# Patient Record
Sex: MALE | Race: WHITE | NOT HISPANIC OR LATINO | Employment: OTHER | URBAN - METROPOLITAN AREA
[De-identification: names, ages, dates, MRNs, and addresses within clinical notes are randomized per-mention and may not be internally consistent; named-entity substitution may affect disease eponyms.]

---

## 2022-10-05 ENCOUNTER — ASSISTED LIVING VISIT (OUTPATIENT)
Dept: GERIATRICS | Facility: CLINIC | Age: 82
End: 2022-10-05
Payer: MEDICARE

## 2022-10-05 DIAGNOSIS — I10 HYPERTENSION, UNSPECIFIED TYPE: ICD-10-CM

## 2022-10-05 DIAGNOSIS — K21.9 GASTROESOPHAGEAL REFLUX DISEASE WITHOUT ESOPHAGITIS: ICD-10-CM

## 2022-10-05 DIAGNOSIS — G25.81 RESTLESS LEGS SYNDROME (RLS): ICD-10-CM

## 2022-10-05 DIAGNOSIS — M79.89 SWELLING OF LOWER LEG: ICD-10-CM

## 2022-10-05 DIAGNOSIS — I48.19 ATRIAL FIBRILLATION, PERSISTENT (H): Primary | ICD-10-CM

## 2022-10-05 DIAGNOSIS — D50.8 OTHER IRON DEFICIENCY ANEMIA: ICD-10-CM

## 2022-10-05 DIAGNOSIS — I73.9 PERIPHERAL VASCULAR DISEASE (H): ICD-10-CM

## 2022-10-05 DIAGNOSIS — J30.9 CHRONIC ALLERGIC RHINITIS: ICD-10-CM

## 2022-10-05 DIAGNOSIS — N18.30 STAGE 3 CHRONIC KIDNEY DISEASE, UNSPECIFIED WHETHER STAGE 3A OR 3B CKD (H): ICD-10-CM

## 2022-10-05 PROBLEM — E87.1 HYPONATREMIA: Status: ACTIVE | Noted: 2019-06-07

## 2022-10-05 PROBLEM — N40.1 BENIGN NON-NODULAR PROSTATIC HYPERPLASIA WITH LOWER URINARY TRACT SYMPTOMS: Status: ACTIVE | Noted: 2017-04-20

## 2022-10-05 PROBLEM — E53.8 B12 DEFICIENCY: Status: ACTIVE | Noted: 2022-03-17

## 2022-10-05 PROBLEM — Z95.3 S/P TAVR (TRANSCATHETER AORTIC VALVE REPLACEMENT), BIOPROSTHETIC: Status: ACTIVE | Noted: 2021-10-21

## 2022-10-05 PROBLEM — R04.0 EPISTAXIS: Status: ACTIVE | Noted: 2022-03-11

## 2022-10-05 PROBLEM — I25.83 CORONARY ARTERY DISEASE DUE TO LIPID RICH PLAQUE: Status: ACTIVE | Noted: 2021-09-03

## 2022-10-05 PROBLEM — I25.10 CORONARY ARTERY DISEASE DUE TO LIPID RICH PLAQUE: Status: ACTIVE | Noted: 2021-09-03

## 2022-10-05 PROBLEM — K68.3 RETROPERITONEAL HEMATOMA: Status: ACTIVE | Noted: 2021-08-26

## 2022-10-05 PROBLEM — I35.0 SEVERE AORTIC VALVE STENOSIS: Status: ACTIVE | Noted: 2020-07-24

## 2022-10-05 PROBLEM — R73.9 HYPERGLYCEMIA: Status: ACTIVE | Noted: 2017-04-20

## 2022-10-05 PROBLEM — N17.9 AKI (ACUTE KIDNEY INJURY) (H): Status: ACTIVE | Noted: 2022-01-02

## 2022-10-05 PROBLEM — L97.522 SKIN ULCER OF LEFT GREAT TOE WITH FAT LAYER EXPOSED (H): Status: RESOLVED | Noted: 2021-02-12 | Resolved: 2022-10-05

## 2022-10-05 PROBLEM — R01.1 MURMUR: Status: ACTIVE | Noted: 2019-06-08

## 2022-10-05 PROBLEM — G47.33 OSA (OBSTRUCTIVE SLEEP APNEA): Status: ACTIVE | Noted: 2022-03-11

## 2022-10-05 PROBLEM — Z96.1 PSEUDOPHAKIA: Status: ACTIVE | Noted: 2022-04-20

## 2022-10-05 PROBLEM — M17.12 OSTEOARTHRITIS OF LEFT KNEE: Status: ACTIVE | Noted: 2022-10-05

## 2022-10-05 PROBLEM — L97.522 SKIN ULCER OF LEFT GREAT TOE WITH FAT LAYER EXPOSED (H): Status: ACTIVE | Noted: 2021-02-12

## 2022-10-05 RX ORDER — FUROSEMIDE 20 MG
20 TABLET ORAL DAILY
COMMUNITY
End: 2023-10-18 | Stop reason: ALTCHOICE

## 2022-10-05 RX ORDER — BIOTIN 1 MG
1000 TABLET ORAL DAILY
COMMUNITY
End: 2023-03-08

## 2022-10-05 RX ORDER — FOLIC ACID 1 MG/1
1 TABLET ORAL DAILY
COMMUNITY
End: 2023-12-13

## 2022-10-05 RX ORDER — CLOPIDOGREL BISULFATE 75 MG/1
75 TABLET ORAL DAILY
COMMUNITY
End: 2023-03-29

## 2022-10-05 RX ORDER — UREA 10 %
500 LOTION (ML) TOPICAL DAILY
COMMUNITY
End: 2024-01-17

## 2022-10-05 RX ORDER — LEVOTHYROXINE SODIUM 150 UG/1
150 TABLET ORAL DAILY
COMMUNITY
End: 2022-10-05

## 2022-10-05 RX ORDER — METHOCARBAMOL 500 MG/1
500 TABLET, FILM COATED ORAL 4 TIMES DAILY PRN
COMMUNITY
End: 2022-10-05

## 2022-10-05 RX ORDER — MOMETASONE FUROATE MONOHYDRATE 50 UG/1
2 SPRAY, METERED NASAL DAILY
COMMUNITY
End: 2024-09-11

## 2022-10-05 RX ORDER — FERROUS GLUCONATE 324(37.5)
1 TABLET ORAL EVERY OTHER DAY
COMMUNITY
End: 2024-01-17

## 2022-10-05 RX ORDER — TAMSULOSIN HYDROCHLORIDE 0.4 MG/1
0.4 CAPSULE ORAL EVERY EVENING
COMMUNITY
End: 2024-04-01

## 2022-10-05 NOTE — LETTER
10/5/2022        RE: Luz Elena Sampson  [address]  [place] [address]        No notes on file      Sincerely,        JITENDRA Freitas CNP

## 2022-10-05 NOTE — PROGRESS NOTES
[facility] GERIATRICS    PRIMARY CARE PROVIDER AND CLINIC:  Chandler Oconnell, JITENDRA CNP, [address] [facility] [address]  Chief Complaint   Patient presents with     [facility] Medical Record Number:  [id number]  Place of Service where encounter took place:  [facility] [[id number]]    Luz Elena Sampson  is a 82 year old  (1940), admitted to the above facility from home due to care needs on 10/2/22. .   HPI:   1. Atrial fibrillation, persistent (H)    2. Peripheral vascular disease (H)    3. Swelling of lower leg    4. Stage 3 chronic kidney disease, unspecified whether stage 3a or 3b CKD (H)    5. Restless legs syndrome (RLS)    6. Other iron deficiency anemia    7. Gastroesophageal reflux disease without esophagitis    8. Chronic allergic rhinitis    9. Hypertension, unspecified type         Patient resided in [place], [place], followed by [facility] and [facility], now moved to [facility] to be closer to family support and started PCP services with [facility] team, seen today in [place] apartment, also left message with son Parmjit RE: status and plan, pleasant 81yo, mild cognitive limitations, Hx/Dx as above plus others, no longer driving, using walker for ambulation, recent bradycardia episode with both amlodipine and metoprolol DC'd, 114/71, 62, IRR, dressed skin tear R arm, edema 2+ with compression on, chronci back pain, recent sleep eval with mild desaturations overnight, overall appears healthy.     CODE STATUS/ADVANCE DIRECTIVES DISCUSSION:  Full Code   ALLERGIES:   Allergies   Allergen Reactions     Warfarin Itching and Other (See Comments)     Alopecia     Nka [No Known Allergies]      Nkda [No Known Drug Allergies]       PAST MEDICAL HISTORY:   Past Medical History:   Diagnosis Date     GERD (gastroesophageal reflux disease)      Hypothyroid      Psoriasis       PAST SURGICAL HISTORY:   has a past surgical history that  includes Cholecystectomy; Arthroplasty hip; Arthrodesis ankle (06/08/2011); Remove hardware ankle (07/10/2012); and Cataract Extraction (Bilateral).  FAMILY HISTORY: family history includes Cataracts in his mother; Dementia in his mother; Diabetes in his maternal grandfather, maternal grandmother, and mother; Heart Disease in his father; Kidney Disease in his father.  SOCIAL HISTORY:   reports that he has quit smoking. His smoking use included cigarettes, pipe, and cigars. He has never used smokeless tobacco. He reports current alcohol use. He reports that he does not use drugs.  Patient's living condition: lives in an assisted living facility    Post Discharge Medication Reconciliation Status:     Post Medication Reconciliation Status: Discharge medications reconciled and changed, see notes/orders         Current Outpatient Medications   Medication Sig     ACE/ARB/ARNI NOT PRESCRIBED (INTENTIONAL) Please choose reason not prescribed from choices below.     biotin 1000 MCG TABS tablet Take 1,000 mcg by mouth daily     carbidopa-levodopa (SINEMET)  MG per tablet Take 1 tablet by mouth At Bedtime     cholecalciferol 25 MCG (1000 UT) TABS Take 1 tablet by mouth daily     clopidogrel (PLAVIX) 75 MG tablet Take 75 mg by mouth daily     cyanocobalamin (VITAMIN B-12) 500 MCG tablet Take 500 mcg by mouth daily     Ferrous Gluconate 324 (37.5 Fe) MG TABS Take 1 tablet by mouth every other day     folic acid (FOLVITE) 1 MG tablet Take 1 mg by mouth daily     furosemide (LASIX) 20 MG tablet Take 20 mg by mouth daily For weight gain of 2 lbs daily or more     mometasone (NASONEX) 50 MCG/ACT nasal spray Spray 2 sprays into both nostrils daily     Multiple Vitamins-Minerals (MULTIVITAL PO) Take  by mouth daily.     omeprazole (PRILOSEC) 20 MG capsule Take 20 mg by mouth daily.     rivaroxaban ANTICOAGULANT (XARELTO) 15 MG TABS tablet Take 15 mg by mouth daily (with dinner)     simvastatin (ZOCOR) 20 MG tablet Take 20 mg by  mouth daily     tamsulosin (FLOMAX) 0.4 MG capsule Take 0.4 mg by mouth every evening     levothyroxine (SYNTHROID/LEVOTHROID) 125 MCG tablet Take 125 mcg by mouth daily.     ORDER FOR DME Wheelchair; Right elevating leg rest  [employer]  Phone: [phone]  Diagnosis: Gait Instability related to Right Ankle Fusion     No current facility-administered medications for this visit.       ROS:  4 point ROS including Respiratory, CV, GI and , other than that noted in the HPI,  is negative    Vitals:  There were no vitals taken for this visit.  Exam:  GENERAL APPEARANCE:  in no distress, appears healthy  ENT:  Mouth and posterior oropharynx normal, moist mucous membranes  RESP:  lungs clear to auscultation   CV:  peripheral edema 2+ in lower legs, irregular rhythm rate  ABDOMEN:  bowel sounds normal  M/S:   Gait and station abnormal unsteady gait  SKIN:  Inspection of skin and subcutaneous tissue baseline  NEURO:   Examination of sensation by touch normal  PSYCH:  oriented X 3    Lab/Diagnostic data:  Recent labs in [facility] reviewed by me today.     ASSESSMENT/PLAN:    (I48.19) Atrial fibrillation, persistent (H)  (primary encounter diagnosis)  Comment: IRR, denies CP/palpitations, also bradycardia at times  Plan:   -continue plavix, statin and xarelto  -refer/follow up cardiology PRN  -staff to check VS as scheduled  -lasix PRN for weight gain 2lb+/day    (I73.9) Peripheral vascular disease (H)  (M79.89) Swelling of lower leg  Comment: edema 2+ firm, cool to touch  Plan: continue complression stockings  -elevate as possible  -follow up vascular PRN    (N18.30) Stage 3 chronic kidney disease, unspecified whether stage 3a or 3b CKD (H)  Comment: lab 9/15 creat 1.35, GFR 51  Plan: dose medications renally as possible  -follow up BMP in 2-3 months    (G25.81) Restless legs syndrome (RLS)  Comment: intermittent, unable to sleep  Plan: continue sinemet for control; effective  -change to OK self administer at HS if having  symptoms    (D50.8) Other iron deficiency anemia  Comment: recent Hgb 11.4  Plan: continue Fe supplement  -check Hgb and Fe with other labs in 2-3 months    (K21.9) Gastroesophageal reflux disease without esophagitis  Comment: chronic, no complaint  Plan: continue omeprazole Qday    (J30.9) Chronic allergic rhinitis  Comment: intermittent runny nose  Plan: continue nasonex daily    (I10) Hypertension, unspecified type  Comment: SBP's in the 130-140 range, HR's 60-70's  Plan: both metoprolol and amlodipine were previously DC'd  -staff to check VS as scheduled  -consider losartan if BP's elevated        Electronically signed by:  JITENDRA Freitas CNP           Documentation of Face-to-Face and Certification for Home Health Services     Patient: Luz Elena Sampson   YOB: 1940  MR Number: [id number]  Today's Date: 10/5/2022    I certify that patient: Luz Elena Sampson is under my care and that I, or a nurse practitioner or physician's assistant working with me, had a face-to-face encounter that meets the physician face-to-face encounter requirements with this patient on: 10/5/2022.    This encounter with the patient was in whole, or in part, for the following medical condition, which is the primary reason for home health care:   1. Atrial fibrillation, persistent (H)    2. Peripheral vascular disease (H)    3. Swelling of lower leg    4. Stage 3 chronic kidney disease, unspecified whether stage 3a or 3b CKD (H)    5. Restless legs syndrome (RLS)    6. Other iron deficiency anemia    7. Gastroesophageal reflux disease without esophagitis    8. Chronic allergic rhinitis    9. Hypertension, unspecified type          I certify that, based on my findings, the following services are medically necessary home health services: Nursing, Occupational Therapy and Physical Therapy.    My clinical findings support the need for the above services because: Nurse is needed: To provide caregiver training to assist with:  med regimen, edema, education.., Occupational Therapy Services are needed to assess and treat cognitive ability and address ADL safety due to impairment in transfers, DME. and Physical Therapy Services are needed to assess and treat the following functional impairments: gait instability.    Further, I certify that my clinical findings support that this patient is homebound (i.e. absences from home require considerable and taxing effort and are for medical reasons or [employer] services or infrequently or of short duration when for other reasons) because: Requires assistance of another person or specialized equipment to access medical services because patient: Is unable to operate assistive equipment on their own...    Based on the above findings. I certify that this patient is confined to the home and needs intermittent skilled nursing care, physical therapy and/or speech therapy.  The patient is under my care, and I have initiated the establishment of the plan of care.  This patient will be followed by a physician who will periodically review the plan of care.  Physician/Provider to provide follow up care: Chandler Oconnell    Responsible Medicare certified PECOS Physician: Chandler Oconnell NP  Electronic Physician Signature: See electronic signature associated with these discharge orders.  Date: 10/5/2022

## 2022-10-06 ENCOUNTER — DOCUMENTATION ONLY (OUTPATIENT)
Dept: OTHER | Facility: CLINIC | Age: 82
End: 2022-10-06

## 2022-10-19 ENCOUNTER — ASSISTED LIVING VISIT (OUTPATIENT)
Dept: GERIATRICS | Facility: CLINIC | Age: 82
End: 2022-10-19
Payer: MEDICARE

## 2022-10-19 DIAGNOSIS — I10 HYPERTENSION, UNSPECIFIED TYPE: ICD-10-CM

## 2022-10-19 DIAGNOSIS — R00.1 BRADYCARDIA: ICD-10-CM

## 2022-10-19 DIAGNOSIS — I48.19 ATRIAL FIBRILLATION, PERSISTENT (H): Primary | ICD-10-CM

## 2022-10-19 DIAGNOSIS — Z95.3 S/P TAVR (TRANSCATHETER AORTIC VALVE REPLACEMENT), BIOPROSTHETIC: ICD-10-CM

## 2022-10-19 NOTE — LETTER
10/19/2022        RE: Luz Elena Sampson  [address]  [place] [address]        No notes on file      Sincerely,        JITENDRA Freitas CNP

## 2022-10-19 NOTE — PROGRESS NOTES
[facility] GERIATRICS    Chief Complaint   Patient presents with     RECHECK     HPI:  Luz Elena Sampson is a 82 year old  (1940), who is being seen today for an episodic care visit at: [facility] ([facility]) [[id number]]. Today's concern is:   1. Atrial fibrillation, persistent (H)    2. Hypertension, unspecified type    3. Bradycardia    4. S/p TAVR (transcatheter aortic valve replacement), bioprosthetic      Patient seen for follow up, also being followed by home care for R arm wound and gait instability, post TAVR in 2021 at [facility], HR was 46 on 10/18, thought by home care is something may be wrong, exam today patient pleasant, oriented, self transfers, appears healthy, /64, HR 64, previous HR was 62, reports it always runs low, could also be from peripheral HR checks versus apical, also mild increase in edema, no distress.     Allergies, and PMH/PSH reviewed in [facility] today.  REVIEW OF SYSTEMS:  4 point ROS including Respiratory, CV, GI and , other than that noted in the HPI,  is negative    Objective:   There were no vitals taken for this visit.  GENERAL APPEARANCE:  in no distress, appears healthy  ENT:  Mouth and posterior oropharynx normal, moist mucous membranes  RESP:  lungs clear to auscultation   CV:  peripheral edema 2+ in lower legs, irregular rhythm rate  ABDOMEN:  bowel sounds normal  M/S:   Gait and station abnormal using scooter  SKIN:  Inspection of skin and subcutaneous tissue baseline  NEURO:   Examination of sensation by touch normal  PSYCH:  affect and mood normal    Recent labs in [facility] reviewed by me today.     Assessment/Plan:  (I48.19) Atrial fibrillation, persistent (H)  (primary encounter diagnosis)  (I10) Hypertension, unspecified type  (R00.1) Bradycardia  (Z95.3) S/p TAVR (transcatheter aortic valve replacement), bioprosthetic  Comment: IRR, HR's 45-65, SBP's 115-160, TAVR in 2021 at [place], no distress, asymptomatic  Plan:   -Adult  Cardiology Eval  Referral  -change lasix to 20mg daily (versus PRN)  -continue compression stockings  -home care RN/PT working with  -continue plavix, xarelto, status  -no plan for changes until pending cards appt  -plan to check labs in early Dec      Post Medication Reconciliation Status: Medication reconciliation previously completed during another office visit        Electronically signed by: JITENDRA Freitas CNP

## 2022-11-09 ENCOUNTER — ASSISTED LIVING VISIT (OUTPATIENT)
Dept: GERIATRICS | Facility: CLINIC | Age: 82
End: 2022-11-09
Payer: MEDICARE

## 2022-11-09 DIAGNOSIS — M79.89 SWELLING OF LOWER LEG: ICD-10-CM

## 2022-11-09 DIAGNOSIS — R04.0 EPISTAXIS: ICD-10-CM

## 2022-11-09 DIAGNOSIS — I48.19 ATRIAL FIBRILLATION, PERSISTENT (H): Primary | ICD-10-CM

## 2022-11-09 RX ORDER — OXYMETAZOLINE HYDROCHLORIDE 0.05 G/100ML
2 SPRAY NASAL 2 TIMES DAILY PRN
Qty: 1 ML | Refills: 0
Start: 2022-11-09 | End: 2024-09-11

## 2022-11-09 NOTE — PROGRESS NOTES
[facility] GERIATRICS    Chief Complaint   Patient presents with     RECHECK     HPI:  Luz Elena Sampson is a 82 year old  (1940), who is being seen today for an episodic care visit at: [facility] ([facility]) [[id number]]. Today's concern is:   1. Atrial fibrillation, persistent (H)    2. Swelling of lower leg    3. Epistaxis      Patient seen today for follow up, HR RR, lungs clear, 144/70, HR 55, appears healthy, bilateral lower legs edema 1+ from lower leg to pedal, PP faint, denies chest discomfort, new nose bleed on 11/7 self reported as mild, has infrequently, overall no distress.    Allergies, and PMH/PSH reviewed in [facility] today.  REVIEW OF SYSTEMS:  4 point ROS including Respiratory, CV, GI and , other than that noted in the HPI,  is negative    Objective:   There were no vitals taken for this visit.  GENERAL APPEARANCE:  in no distress, appears healthy  ENT:  Mouth and posterior oropharynx normal, moist mucous membranes  RESP:  lungs clear to auscultation   CV:  peripheral edema 1+ in lower legs/pedal, rate-normal  ABDOMEN:  bowel sounds normal  M/S:   Gait and station abnormal unsteady gait  SKIN:  Inspection of skin and subcutaneous tissue baseline  NEURO:   Examination of sensation by touch normal  PSYCH:  affect and mood normal    Labs done in SNF are in [facility]. Please refer to them using [facility]/Care Everywhere.    Assessment/Plan:  (I48.19) Atrial fibrillation, persistent (H)  (primary encounter diagnosis)  Comment: RRR, denies chest discomfort  Plan: continue plavix, xarelto and zocor  -follow up cardiology on 11/10; to bring med list and recent BP's to visit for reconciliation    (M79.89) Swelling of lower leg  Comment: 1+, skin intact  Plan: continue lasix 20mg  -of note, not on K supplement  -BMP, TSH, Fe, VitaD and B12 on 12/6    (R04.0) Epistaxis  Comment: another mild episode on 11/7  Plan: afrin 2 spray affected nare BID PRN  -educated on elevation, pressure  and cold    MED REC REQUIRED  Post Medication Reconciliation Status: medication reconcilation previously completed during another office visit        Electronically signed by: JITENDRA Freitas CNP

## 2022-11-09 NOTE — LETTER
11/9/2022        RE: Luz Elena Sampson  [address]  [place] [address]        No notes on file      Sincerely,        JITENDRA Freitas CNP

## 2022-11-10 ENCOUNTER — ANCILLARY PROCEDURE (OUTPATIENT)
Dept: CARDIOLOGY | Facility: CLINIC | Age: 82
End: 2022-11-10
Attending: INTERNAL MEDICINE
Payer: MEDICARE

## 2022-11-10 ENCOUNTER — OFFICE VISIT (OUTPATIENT)
Dept: CARDIOLOGY | Facility: CLINIC | Age: 82
End: 2022-11-10
Attending: NURSE PRACTITIONER
Payer: MEDICARE

## 2022-11-10 VITALS
OXYGEN SATURATION: 97 % | SYSTOLIC BLOOD PRESSURE: 127 MMHG | DIASTOLIC BLOOD PRESSURE: 73 MMHG | HEIGHT: 72 IN | BODY MASS INDEX: 31.29 KG/M2 | WEIGHT: 231 LBS | HEART RATE: 47 BPM

## 2022-11-10 DIAGNOSIS — E78.2 MIXED HYPERLIPIDEMIA: ICD-10-CM

## 2022-11-10 DIAGNOSIS — I10 HYPERTENSION, UNSPECIFIED TYPE: ICD-10-CM

## 2022-11-10 DIAGNOSIS — Z95.3 S/P TAVR (TRANSCATHETER AORTIC VALVE REPLACEMENT), BIOPROSTHETIC: ICD-10-CM

## 2022-11-10 DIAGNOSIS — R00.1 BRADYCARDIA: ICD-10-CM

## 2022-11-10 DIAGNOSIS — N18.31 STAGE 3A CHRONIC KIDNEY DISEASE (H): ICD-10-CM

## 2022-11-10 DIAGNOSIS — I48.19 ATRIAL FIBRILLATION, PERSISTENT (H): ICD-10-CM

## 2022-11-10 DIAGNOSIS — G47.33 OSA (OBSTRUCTIVE SLEEP APNEA): ICD-10-CM

## 2022-11-10 DIAGNOSIS — I48.19 ATRIAL FIBRILLATION, PERSISTENT (H): Primary | ICD-10-CM

## 2022-11-10 PROCEDURE — 93242 EXT ECG>48HR<7D RECORDING: CPT | Performed by: INTERNAL MEDICINE

## 2022-11-10 PROCEDURE — 99204 OFFICE O/P NEW MOD 45 MIN: CPT | Performed by: INTERNAL MEDICINE

## 2022-11-10 PROCEDURE — 93244 EXT ECG>48HR<7D REV&INTERPJ: CPT | Performed by: INTERNAL MEDICINE

## 2022-11-10 PROCEDURE — 93000 ELECTROCARDIOGRAM COMPLETE: CPT | Performed by: INTERNAL MEDICINE

## 2022-11-10 NOTE — DISCHARGE INSTRUCTIONS
We have applied a Zio Patch (heart) monitor for you to wear for 3.  You may remove the heart monitor on 11/13/22 at 4pm.  Please see the enclosed instructions for further information, as well as instructions for removal of the heart monitor and return mailing directions.  If you should have questions regarding your monitor, please call [employer] at [phone].  The Cardiology Nurse will contact you with your results (please see result notification details at bottom of summary).    *PLEASE DO NOT SHOWER OR INDUCE EXCESSIVE SWEATING IN THE FIRST 24 HOURS OF WEARING*    Please also call your insurance company for any billing questions regarding the monitor.

## 2022-11-10 NOTE — LETTER
11/10/2022      RE: Luz Elena Sampson  [address] [place] [address]  [place] [address]       Dear Colleague,    Thank you for the opportunity to participate in the care of your patient, Luz Elena Sampson, at the [facility]. Please see a copy of my visit note below.    I am delighted to see Luz Elena Sampson in consultation.The primary encounter diagnosis was Mixed hyperlipidemia. Diagnoses of Atrial fibrillation, persistent (H), Hypertension, unspecified type, Bradycardia, S/p TAVR (transcatheter aortic valve replacement), bioprosthetic, JOSE (obstructive sleep apnea), and Stage 3a chronic kidney disease (H) were also pertinent to this visit.   As you know, the patient is a 82 year old  male. He   has a past medical history of Atrial fibrillation (H), Chronic kidney disease, GERD (gastroesophageal reflux disease), Hyperlipidemia, Hypertension, Hypothyroid, and Psoriasis..    On this visit, the patient states that he has no complaints.  The patient denies chest pressure/discomfort, dyspnea, palpitations, near-syncope, syncope, orthopnea and paroxysmal nocturnal dyspnea.    The patient's cardiovascular risk factors include known cardiac disease, hypertension and high cholesterol.    The following portions of the patient's history were reviewed and updated as appropriate: allergies, current medications, past family history, past medical history, past social history, past surgical history, and the problem list.    PMH: The patient's past medical history includes:    Past Medical History:   Diagnosis Date     Atrial fibrillation (H)      Chronic kidney disease      GERD (gastroesophageal reflux disease)      Hyperlipidemia      Hypertension      Hypothyroid      Psoriasis       Past Surgical History:   Procedure Laterality Date     ARTHRODESIS ANKLE  06/08/2011    Procedure:ARTHRODESIS ANKLE; Subtalar and Tibiotalar Fusion     ; Surgeon:TIMMY  FLAQUITO VALLADARES; [facility]     ARTHROPLASTY HIP      right     CATARACT EXTRACTION Bilateral      CHOLECYSTECTOMY       CV TRANSCATHETER AORTIC VALVE REPLACEMENT TRANSAORTIC APPROACH N/A      REMOVE HARDWARE ANKLE  07/10/2012    Procedure: REMOVE HARDWARE ANKLE;  Right Heel Hardware Removal with irrigation  ;  Surgeon: Flaquito Hull MD;  [facility]       The patient's medications as of the current encounter are:     Current Outpatient Medications   Medication Sig Dispense Refill     ACE/ARB/ARNI NOT PRESCRIBED (INTENTIONAL) Please choose reason not prescribed from choices below.       biotin 1000 MCG TABS tablet Take 1,000 mcg by mouth daily       carbidopa-levodopa (SINEMET)  MG per tablet Take 1 tablet by mouth At Bedtime       cholecalciferol 25 MCG (1000 UT) TABS Take 1 tablet by mouth daily       clopidogrel (PLAVIX) 75 MG tablet Take 75 mg by mouth daily       cyanocobalamin (VITAMIN B-12) 500 MCG tablet Take 500 mcg by mouth daily       Ferrous Gluconate 324 (37.5 Fe) MG TABS Take 1 tablet by mouth every other day       folic acid (FOLVITE) 1 MG tablet Take 1 mg by mouth daily       furosemide (LASIX) 20 MG tablet Take 20 mg by mouth daily       levothyroxine (SYNTHROID/LEVOTHROID) 125 MCG tablet Take 125 mcg by mouth daily.       mometasone (NASONEX) 50 MCG/ACT nasal spray Spray 2 sprays into both nostrils daily       Multiple Vitamins-Minerals (MULTIVITAL PO) Take  by mouth daily.       omeprazole (PRILOSEC) 20 MG capsule Take 20 mg by mouth daily.       ORDER FOR [facility] Wheelchair; Right elevating leg rest  [facility]  Phone: [phone]  Diagnosis: Gait Instability related to Right Ankle Fusion 1 Device 0     oxymetazoline (AFRIN) 0.05 % nasal spray Spray 2 sprays into both nostrils 2 times daily as needed for congestion 1 mL 0     rivaroxaban ANTICOAGULANT (XARELTO) 15 MG TABS tablet Take 15 mg by mouth daily (with dinner)       simvastatin (ZOCOR) 20 MG tablet Take 20 mg by mouth  daily       tamsulosin (FLOMAX) 0.4 MG capsule Take 0.4 mg by mouth every evening         Labs:     Office Visit on 11/19/2012   Component Date Value Ref Range Status     CRP Inflammation 11/19/2012 5.2  0.0 - 8.0 mg/L Final     Sed Rate 11/19/2012 13  0 - 20 mm/h Final     WBC 11/19/2012 4.9  4.0 - 11.0 10e9/L Final     RBC Count 11/19/2012 4.73  4.4 - 5.9 10e12/L Final     Hemoglobin 11/19/2012 14.0  13.3 - 17.7 g/dL Final     Hematocrit 11/19/2012 40.3  40.0 - 53.0 % Final     MCV 11/19/2012 85  78 - 100 fl Final     MCH 11/19/2012 29.6  26.5 - 33.0 pg Final     MCHC 11/19/2012 34.7  31.5 - 36.5 g/dL Final     RDW 11/19/2012 14.8  10.0 - 15.0 % Final     Platelet Count 11/19/2012 164  150 - 450 10e9/L Final     Diff Method 11/19/2012 Automated Method   Final     % Neutrophils 11/19/2012 53.5  40 - 75 % Final     % Lymphocytes 11/19/2012 28.9  20 - 48 % Final     % Monocytes 11/19/2012 8.2  0 - 12 % Final     % Eosinophils 11/19/2012 8.4 (H)  0 - 6 % Final     % Basophils 11/19/2012 0.8  0 - 2 % Final     % Immature Granulocytes 11/19/2012 0.2  0 - 0.4 % Final     Absolute Neutrophil 11/19/2012 2.6  1.6 - 8.3 10e9/L Final     Absolute Lymphocytes 11/19/2012 1.4  0.8 - 5.3 10e9/L Final     Absolute Monocytes 11/19/2012 0.4  0.0 - 1.3 10e9/L Final     Absolute Eosinophils 11/19/2012 0.4  0.0 - 0.7 10e9/L Final     Absolute Basophils 11/19/2012 0.0  0.0 - 0.2 10e9/L Final     Abs Immature Granulocytes 11/19/2012 0.0  0 - 0.03 10e9/L Final     Sodium 11/19/2012 140  133 - 144 mmol/L Final     Potassium 11/19/2012 4.8  3.4 - 5.3 mmol/L Final     Chloride 11/19/2012 108  94 - 109 mmol/L Final     Carbon Dioxide 11/19/2012 24  20 - 32 mmol/L Final     Anion Gap 11/19/2012 8  6 - 17 mmol/L Final     Glucose 11/19/2012 90  60 - 99 mg/dL Final     Urea Nitrogen 11/19/2012 25  7 - 30 mg/dL Final     Creatinine 11/19/2012 1.33 (H)  0.66 - 1.25 mg/dL Final     GFR Estimate 11/19/2012 53 (L)  >60 mL/min/1.7m2 Final     GFR  Estimate If Black 11/19/2012 64  >60 mL/min/1.7m2 Final     Calcium 11/19/2012 8.8  8.5 - 10.4 mg/dL Final       Allergies:    Allergies   Allergen Reactions     Warfarin Itching and Other (See Comments)     Alopecia     Nka [No Known Allergies]      Nkda [No Known Drug Allergies]        Family History:   Family History   Problem Relation Age of Onset     Cataracts Mother      Dementia Mother      Diabetes Mother      Alzheimer Disease Mother      Hypertension Father      Heart Disease Father      Kidney Disease Father      Diabetes Maternal Grandmother      Diabetes Maternal Grandfather      Spina bifida Brother      Low Back Problems Daughter        Psychosocial history:  reports that he has quit smoking. His smoking use included cigarettes, pipe, and cigars. He has never used smokeless tobacco. He reports current alcohol use. He reports that he does not use drugs.    Review of systems: negative for, palpitations, exertional chest pain or pressure, paroxysmal nocturnal dyspnea, dyspnea on exertion, orthopnea and syncope or near-syncope    In addition,   General: No change in weight, sleep or appetite.  Normal energy.  No fever or chills  Eyes: Negative for vision changes or eye problems  ENT: No problems with ears, nose or throat.  No difficulty swallowing.  Resp: No coughing, wheezing or shortness of breath  GI: No nausea, vomiting,  heartburn, abdominal pain, diarrhea, constipation or change in bowel habits  : CKD  Musculoskeletal: No significant muscle or joint pains  Neurologic: No headaches, numbness, tingling, weakness, problems with balance or coordination  Psychiatric: No problems with anxiety, depression or mental health  Heme/immune/allergy: No history of bleeding or clotting problems or anemia.    Endocrine: No history of thyroid disease, diabetes or other endocrine disorders  Skin: No rashes,worrisome lesions or skin problems  Vascular:  No claudication, lifestyle limiting or otherwise; no ischemic  rest pain; no non-healing ulcers. No weakness, No loss of sensation        Physical examination  Vitals: /73   Pulse (!) 47   Ht 1.829 m (6')   Wt 104.8 kg (231 lb)   SpO2 97%   BMI 31.33 kg/m    BMI= Body mass index is 31.33 kg/m .    In general, the patient is a pleasant male in no apparent distress.    HEENT: Normiocephalic and atraumatic.  PERRLA.  EOMI.  Sclerae white, not injected.  Nares clear.  Pharynx without erythema or exudate.  Dentition intact.    Neck: No adenopathy.  No thyromegaly. Carotids +2/2 bilaterally without bruits.  No jugular venous distension.   Heart:  The PMI is in the 5th ICS in the midclavicular line. There is no heave. Irregularly irregular. Normal S1, S2 splits physiologically. No murmur, rub, click, or gallop.    Lungs: Clear to asculation.  No ronchi, wheezes, rales.  No dullness to percussion.   Abdomen: Soft, nontender, nondistended. No organomegaly. No AAA.  No bruits.   Extremities: No clubbing, cyanosis. Bilat mild edema. The pulses were intact bilaterally.   Neurological: The neurological examination reveal a patient who was oriented to person, place, and time.  The remainder of the examination was nonfocal.    Cardiac tests include:    1/3/22 - Echo normal EF, normal functioning bioprosthetic valve    Assessment and Plan    1. Bradycardia - check ziopatch, ECG  - recent TSH normal, on thyroid replacement  - no HR lowing meds  2. S/p TAVR - check echo  3. Chronic atrial fibrillation - on anticoagulation  4. HTN - on lasix  5. HL - on statin      The patient is to return  In 6 months. The patient understood the treatment plan as outlined above.  There were no barriers to learning. Patient was accompanied by son.      Roge Romero MD

## 2022-11-10 NOTE — PROGRESS NOTES
I am delighted to see Luz Elena Sampson in consultation.The primary encounter diagnosis was Mixed hyperlipidemia. Diagnoses of Atrial fibrillation, persistent (H), Hypertension, unspecified type, Bradycardia, S/p TAVR (transcatheter aortic valve replacement), bioprosthetic, JOSE (obstructive sleep apnea), and Stage 3a chronic kidney disease (H) were also pertinent to this visit.   As you know, the patient is a 82 year old  male. He   has a past medical history of Atrial fibrillation (H), Chronic kidney disease, GERD (gastroesophageal reflux disease), Hyperlipidemia, Hypertension, Hypothyroid, and Psoriasis..    On this visit, the patient states that he has no complaints.  The patient denies chest pressure/discomfort, dyspnea, palpitations, near-syncope, syncope, orthopnea and paroxysmal nocturnal dyspnea.    The patient's cardiovascular risk factors include known cardiac disease, hypertension and high cholesterol.    The following portions of the patient's history were reviewed and updated as appropriate: allergies, current medications, past family history, past medical history, past social history, past surgical history, and the problem list.    PMH: The patient's past medical history includes:    Past Medical History:   Diagnosis Date     Atrial fibrillation (H)      Chronic kidney disease      GERD (gastroesophageal reflux disease)      Hyperlipidemia      Hypertension      Hypothyroid      Psoriasis       Past Surgical History:   Procedure Laterality Date     ARTHRODESIS ANKLE  06/08/2011    Procedure:ARTHRODESIS ANKLE; Subtalar and Tibiotalar Fusion     ; Surgeon:FLAQUITO WARNER; [facility]     ARTHROPLASTY HIP      right     CATARACT EXTRACTION Bilateral      CHOLECYSTECTOMY       CV TRANSCATHETER AORTIC VALVE REPLACEMENT TRANSAORTIC APPROACH N/A      REMOVE HARDWARE ANKLE  07/10/2012    Procedure: REMOVE HARDWARE ANKLE;  Right Heel Hardware Removal with irrigation  ;  Surgeon: Flaquito Warner  MD Akash;  [facility]       The patient's medications as of the current encounter are:     Current Outpatient Medications   Medication Sig Dispense Refill     ACE/ARB/ARNI NOT PRESCRIBED (INTENTIONAL) Please choose reason not prescribed from choices below.       biotin 1000 MCG TABS tablet Take 1,000 mcg by mouth daily       carbidopa-levodopa (SINEMET)  MG per tablet Take 1 tablet by mouth At Bedtime       cholecalciferol 25 MCG (1000 UT) TABS Take 1 tablet by mouth daily       clopidogrel (PLAVIX) 75 MG tablet Take 75 mg by mouth daily       cyanocobalamin (VITAMIN B-12) 500 MCG tablet Take 500 mcg by mouth daily       Ferrous Gluconate 324 (37.5 Fe) MG TABS Take 1 tablet by mouth every other day       folic acid (FOLVITE) 1 MG tablet Take 1 mg by mouth daily       furosemide (LASIX) 20 MG tablet Take 20 mg by mouth daily       levothyroxine (SYNTHROID/LEVOTHROID) 125 MCG tablet Take 125 mcg by mouth daily.       mometasone (NASONEX) 50 MCG/ACT nasal spray Spray 2 sprays into both nostrils daily       Multiple Vitamins-Minerals (MULTIVITAL PO) Take  by mouth daily.       omeprazole (PRILOSEC) 20 MG capsule Take 20 mg by mouth daily.       ORDER FOR [facility] Wheelchair; Right elevating leg rest  [facility]  Phone: [phone]  Diagnosis: Gait Instability related to Right Ankle Fusion 1 Device 0     oxymetazoline (AFRIN) 0.05 % nasal spray Spray 2 sprays into both nostrils 2 times daily as needed for congestion 1 mL 0     rivaroxaban ANTICOAGULANT (XARELTO) 15 MG TABS tablet Take 15 mg by mouth daily (with dinner)       simvastatin (ZOCOR) 20 MG tablet Take 20 mg by mouth daily       tamsulosin (FLOMAX) 0.4 MG capsule Take 0.4 mg by mouth every evening         Labs:     Office Visit on 11/19/2012   Component Date Value Ref Range Status     CRP Inflammation 11/19/2012 5.2  0.0 - 8.0 mg/L Final     Sed Rate 11/19/2012 13  0 - 20 mm/h Final     WBC 11/19/2012 4.9  4.0 - 11.0 10e9/L Final     RBC Count 11/19/2012  4.73  4.4 - 5.9 10e12/L Final     Hemoglobin 11/19/2012 14.0  13.3 - 17.7 g/dL Final     Hematocrit 11/19/2012 40.3  40.0 - 53.0 % Final     MCV 11/19/2012 85  78 - 100 fl Final     MCH 11/19/2012 29.6  26.5 - 33.0 pg Final     MCHC 11/19/2012 34.7  31.5 - 36.5 g/dL Final     RDW 11/19/2012 14.8  10.0 - 15.0 % Final     Platelet Count 11/19/2012 164  150 - 450 10e9/L Final     Diff Method 11/19/2012 Automated Method   Final     % Neutrophils 11/19/2012 53.5  40 - 75 % Final     % Lymphocytes 11/19/2012 28.9  20 - 48 % Final     % Monocytes 11/19/2012 8.2  0 - 12 % Final     % Eosinophils 11/19/2012 8.4 (H)  0 - 6 % Final     % Basophils 11/19/2012 0.8  0 - 2 % Final     % Immature Granulocytes 11/19/2012 0.2  0 - 0.4 % Final     Absolute Neutrophil 11/19/2012 2.6  1.6 - 8.3 10e9/L Final     Absolute Lymphocytes 11/19/2012 1.4  0.8 - 5.3 10e9/L Final     Absolute Monocytes 11/19/2012 0.4  0.0 - 1.3 10e9/L Final     Absolute Eosinophils 11/19/2012 0.4  0.0 - 0.7 10e9/L Final     Absolute Basophils 11/19/2012 0.0  0.0 - 0.2 10e9/L Final     Abs Immature Granulocytes 11/19/2012 0.0  0 - 0.03 10e9/L Final     Sodium 11/19/2012 140  133 - 144 mmol/L Final     Potassium 11/19/2012 4.8  3.4 - 5.3 mmol/L Final     Chloride 11/19/2012 108  94 - 109 mmol/L Final     Carbon Dioxide 11/19/2012 24  20 - 32 mmol/L Final     Anion Gap 11/19/2012 8  6 - 17 mmol/L Final     Glucose 11/19/2012 90  60 - 99 mg/dL Final     Urea Nitrogen 11/19/2012 25  7 - 30 mg/dL Final     Creatinine 11/19/2012 1.33 (H)  0.66 - 1.25 mg/dL Final     GFR Estimate 11/19/2012 53 (L)  >60 mL/min/1.7m2 Final     GFR Estimate If Black 11/19/2012 64  >60 mL/min/1.7m2 Final     Calcium 11/19/2012 8.8  8.5 - 10.4 mg/dL Final       Allergies:    Allergies   Allergen Reactions     Warfarin Itching and Other (See Comments)     Alopecia     Nka [No Known Allergies]      Nkda [No Known Drug Allergies]        Family History:   Family History   Problem Relation Age of  Onset     Cataracts Mother      Dementia Mother      Diabetes Mother      Alzheimer Disease Mother      Hypertension Father      Heart Disease Father      Kidney Disease Father      Diabetes Maternal Grandmother      Diabetes Maternal Grandfather      Spina bifida Brother      Low Back Problems Daughter        Psychosocial history:  reports that he has quit smoking. His smoking use included cigarettes, pipe, and cigars. He has never used smokeless tobacco. He reports current alcohol use. He reports that he does not use drugs.    Review of systems: negative for, palpitations, exertional chest pain or pressure, paroxysmal nocturnal dyspnea, dyspnea on exertion, orthopnea and syncope or near-syncope    In addition,   General: No change in weight, sleep or appetite.  Normal energy.  No fever or chills  Eyes: Negative for vision changes or eye problems  ENT: No problems with ears, nose or throat.  No difficulty swallowing.  Resp: No coughing, wheezing or shortness of breath  GI: No nausea, vomiting,  heartburn, abdominal pain, diarrhea, constipation or change in bowel habits  : CKD  Musculoskeletal: No significant muscle or joint pains  Neurologic: No headaches, numbness, tingling, weakness, problems with balance or coordination  Psychiatric: No problems with anxiety, depression or mental health  Heme/immune/allergy: No history of bleeding or clotting problems or anemia.    Endocrine: No history of thyroid disease, diabetes or other endocrine disorders  Skin: No rashes,worrisome lesions or skin problems  Vascular:  No claudication, lifestyle limiting or otherwise; no ischemic rest pain; no non-healing ulcers. No weakness, No loss of sensation        Physical examination  Vitals: /73   Pulse (!) 47   Ht 1.829 m (6')   Wt 104.8 kg (231 lb)   SpO2 97%   BMI 31.33 kg/m    BMI= Body mass index is 31.33 kg/m .    In general, the patient is a pleasant male in no apparent distress.    HEENT: Normiocephalic and  atraumatic.  PERRLA.  EOMI.  Sclerae white, not injected.  Nares clear.  Pharynx without erythema or exudate.  Dentition intact.    Neck: No adenopathy.  No thyromegaly. Carotids +2/2 bilaterally without bruits.  No jugular venous distension.   Heart:  The PMI is in the 5th ICS in the midclavicular line. There is no heave. Irregularly irregular. Normal S1, S2 splits physiologically. No murmur, rub, click, or gallop.    Lungs: Clear to asculation.  No ronchi, wheezes, rales.  No dullness to percussion.   Abdomen: Soft, nontender, nondistended. No organomegaly. No AAA.  No bruits.   Extremities: No clubbing, cyanosis. Bilat mild edema. The pulses were intact bilaterally.   Neurological: The neurological examination reveal a patient who was oriented to person, place, and time.  The remainder of the examination was nonfocal.    Cardiac tests include:    1/3/22 - Echo normal EF, normal functioning bioprosthetic valve    Assessment and Plan    1. Bradycardia - check ziopatch, ECG  - recent TSH normal, on thyroid replacement  - no HR lowing meds  2. S/p TAVR - check echo  3. Chronic atrial fibrillation - on anticoagulation  4. HTN - on lasix  5. HL - on statin      The patient is to return  In 6 months. The patient understood the treatment plan as outlined above.  There were no barriers to learning. Patient was accompanied by son.      Roge Romero MD

## 2022-11-10 NOTE — PATIENT INSTRUCTIONS
Thank you for coming to the [facility] at [place]; please note the following instructions:    1. Echo     2. 3 day Zio     3. Follow up in 6 months        If you have any questions regarding your visit, please contact your care team:     CARDIOLOGY  TELEPHONE NUMBER   Izzy BAKERFelicita, Registered Nurse  Savanah DESAI, Registered Nurse  Kathi ONOFRE, Registered Medical Assistant  Breanne MARIE, Certified Medical Assistant  Keely DANIEL, Visit Facilitator [phone] (select option 1)    *After hours: [phone]   For Scheduling Appts:     [phone] (select option 1)    *After hours: [phone]   For the Device Clinic (Pacemakers and ICD's)  Antonella LOCKHART, Registered Nurse   During business hours: [phone]    *After business hours:  [phone] (select option 4)      Normal test result notifications will be released via [employer] or mailed within 7 business days.  All other test results, will be communicated via telephone once reviewed by your cardiologist.    If you need a medication refill, please contact your pharmacy.  Please allow 3 business days for your refill to be completed.    As always, thank you for trusting us with your health care needs!

## 2022-11-10 NOTE — LETTER
November 29, 2022      [facility]  [address] [facility]  [place] [address]        Dear ,    We are writing to inform you of your test results from your Zio Patch monitor.     Your heart recorder showed no significant slow heartbeats.      Roge Romero MD    If you have any questions or concerns, please call the clinic at the number listed above.       Sincerely,      Roge Romero MD

## 2022-11-16 ENCOUNTER — ASSISTED LIVING VISIT (OUTPATIENT)
Dept: GERIATRICS | Facility: CLINIC | Age: 82
End: 2022-11-16
Payer: MEDICARE

## 2022-11-16 VITALS — HEIGHT: 72 IN | BODY MASS INDEX: 31.33 KG/M2

## 2022-11-16 DIAGNOSIS — I10 HYPERTENSION, UNSPECIFIED TYPE: ICD-10-CM

## 2022-11-16 DIAGNOSIS — I48.19 ATRIAL FIBRILLATION, PERSISTENT (H): Primary | ICD-10-CM

## 2022-11-16 DIAGNOSIS — R04.0 EPISTAXIS: ICD-10-CM

## 2022-11-16 PROBLEM — M79.642 HAND PAIN, LEFT: Status: ACTIVE | Noted: 2021-03-03

## 2022-11-16 PROBLEM — R73.03 PREDIABETES: Status: ACTIVE | Noted: 2017-06-15

## 2022-11-16 PROBLEM — Z79.01 LONG TERM (CURRENT) USE OF ANTICOAGULANTS: Status: ACTIVE | Noted: 2017-04-24

## 2022-11-16 PROBLEM — E61.1 IRON DEFICIENCY: Status: ACTIVE | Noted: 2017-06-15

## 2022-11-16 PROBLEM — E66.9 OBESITY (BMI 30-39.9): Status: ACTIVE | Noted: 2017-04-20

## 2022-11-16 NOTE — LETTER
11/16/2022        RE: Luz Elena Sampson  [facility]  [address] [place] [address]  [place] [address]        [facility] GERIATR[facility]  [facility] Medical Record Number: [id number]  Chief Complaint   Patient presents with     RECHECK     HPI: Luz Elena Sampson is a 82 year old (1940), who is being seen today for an episodic care visit at: [facility] ([facility]) [[id number]]. Today's concern is: ***    Allergies and PMH/PSH reviewed in [facility] today.    REVIEW OF SYSTEMS:  {gvuodn37:732861}    Objective:   There were no vitals taken for this visit.  Exam:  {FDC physical exam :851602}    Labs:   {fgslab:764292}    Assessment/Plan:  {FGS DX:988351}    MED REC REQUIRED{TIP  Click the link below to document or use med rec list, use list to pull in response :285409}  Post Medication Reconciliation Status: {MED REC LIST:211978}    Orders:  {fgsorders:684721}    Electronically signed by: Val Kemp***        Sincerely,        JITENDRA Freitas CNP

## 2022-11-16 NOTE — PROGRESS NOTES
[facility] GERIATRICS  [place] Medical Record Number: [id number]  Chief Complaint   Patient presents with     RECHECK     HPI: Luz Elena Sampson is a 82 year old (1940), who is being seen today for an episodic care visit at: [facility] ([facility]) [[id number]]. Today's concern is:   1. Atrial fibrillation, persistent (H)    2. Hypertension, unspecified type    3. Epistaxis      Patient seen for follow up, recently seen by cardiology with no change in cardiac medications, confirmed taking lasix 20mg/day, SBP's in the 140-150 range, ambulatory, denies dizziness, recent bout epistaxis has cleared, did use afrin and effective, overall appears healthy, no distress.    Allergies and PMH/PSH reviewed in [facility]IC today.    REVIEW OF SYSTEMS:  4 point ROS including Respiratory, CV, GI and , other than that noted in the HPI,  is negative    Objective:   Ht 1.829 m (6')   BMI 31.33 kg/m    Exam:  GENERAL APPEARANCE:  in no distress, appears healthy  ENT:  Mouth and posterior oropharynx normal, moist mucous membranes  RESP:  lungs clear to auscultation   CV:  peripheral edema moderate+ in lower legs, irregular rhythm rates  ABDOMEN:  bowel sounds normal  M/S:   Gait and station abnormal using walker  SKIN:  Inspection of skin and subcutaneous tissue baseline  NEURO:   Examination of sensation by touch normal  PSYCH:  affect and mood normal    Labs:   Labs done in SNF are in [facility]. Please refer to them using MRO/Care Everywhere.    Assessment/Plan:  (I48.19) Atrial fibrillation, persistent (H)  (primary encounter diagnosis)  Comment: seen by cardiology 11/11  Plan: continue lasix, plavix and ASA  -follow up cardiology in 6 months  -ordering     (I10) Hypertension, unspecified type  Comment: SBP's in the 140-150 range  Plan: continue lasix 20mg  -staff to check VS as scheduled    (R04.0) Epistaxis  Comment: new onset last week, on plavix  Plan: started afrin nasal BID PRN;  effective  -instructed on elevation, pressure and ice   -of continues to happen consider referral to ENT    MED REC REQUIRED  Post Medication Reconciliation Status: medication reconcilation previously completed during another office visit        Electronically signed by: JITENDRA Freitas CNP

## 2022-11-30 ENCOUNTER — ASSISTED LIVING VISIT (OUTPATIENT)
Dept: GERIATRICS | Facility: CLINIC | Age: 82
End: 2022-11-30
Payer: MEDICARE

## 2022-11-30 VITALS — OXYGEN SATURATION: 95 % | TEMPERATURE: 97.2 F

## 2022-11-30 DIAGNOSIS — U07.1 INFECTION DUE TO 2019 NOVEL CORONAVIRUS: ICD-10-CM

## 2022-11-30 DIAGNOSIS — I48.19 ATRIAL FIBRILLATION, PERSISTENT (H): Primary | ICD-10-CM

## 2022-11-30 DIAGNOSIS — R05.9 COUGH, UNSPECIFIED TYPE: ICD-10-CM

## 2022-11-30 DIAGNOSIS — R53.83 LETHARGY: ICD-10-CM

## 2022-11-30 NOTE — LETTER
11/30/2022        RE: Luz Elena Sampson  [facility]  [address] [place] [address]  [place] [address]        No notes on file      Sincerely,        JITENDRA Freitas CNP

## 2022-11-30 NOTE — PROGRESS NOTES
[facility] GERIATRICS    Chief Complaint   Patient presents with     RECHECK     HPI:  Luz Elena Sampson is a 82 year old  (1940), who is being seen today for an episodic care visit at: [facility] ([facility]) [[id number]]. Today's concern is:   1. Atrial fibrillation, persistent (H)    2. Infection due to 2019 novel coronavirus    3. Lethargy    4. Cough, unspecified type      Patient seen for follow up, today IRR, denies CP/palpitations, covid + on 11/25, now having cough, congestion and lethargy, outside window for Tx, no distress, just states feeling crappy, ambulatory, I&O adequate, lungs clear, skin tear R arm healed and home care nurse done, to continue PT through 12/2.    Allergies, and PMH/PSH reviewed in [facility] today.  REVIEW OF SYSTEMS:  4 point ROS including Respiratory, CV, GI and , other than that noted in the HPI,  is negative    Objective:   Temp 97.2  F (36.2  C)   SpO2 95%   GENERAL APPEARANCE:  in no distress, appears healthy  ENT:  Mouth and posterior oropharynx normal, moist mucous membranes  RESP:  lungs clear to auscultation   CV:  irregular rhythm rate  ABDOMEN:  bowel sounds normal  M/S:   Gait and station normal  SKIN:  Inspection of skin and subcutaneous tissue baseline  NEURO:   Examination of sensation by touch normal  PSYCH:  affect and mood normal    Labs done in SNF are in [facility]. Please refer to them using EPIC/Care Everywhere.    Assessment/Plan:  (I48.19) Atrial fibrillation, persistent (H)  (primary encounter diagnosis)  Comment: IRR, no CP/palpitations  Plan:   -ziopatch sent in, 100% Afib  -continue plavix, statin and xarelto  -follow up cardiology in 6 months    (U07.1) Infection due to 2019 novel coronavirus  (R53.83) Lethargy  (R05.9) Cough, unspecified type  Comment: positive on 11/25, mild symptoms  Plan: start mucinex 600mg BID x7 days  -staff to check temp/sats daily  -remain on precautions x14d  -if status declines to inform PCP or  send to ER    MED REC REQUIRED  Post Medication Reconciliation Status: medication reconcilation previously completed during another office visit        Electronically signed by: JITENDRA Freitas CNP

## 2022-12-05 ENCOUNTER — LAB REQUISITION (OUTPATIENT)
Dept: LAB | Facility: CLINIC | Age: 82
End: 2022-12-05
Payer: MEDICARE

## 2022-12-05 DIAGNOSIS — N18.9 CHRONIC KIDNEY DISEASE, UNSPECIFIED: ICD-10-CM

## 2022-12-05 DIAGNOSIS — E03.9 HYPOTHYROIDISM, UNSPECIFIED: ICD-10-CM

## 2022-12-06 LAB
DEPRECATED CALCIDIOL+CALCIFEROL SERPL-MC: 50 UG/L (ref 20–75)
VIT B12 SERPL-MCNC: 1182 PG/ML (ref 232–1245)

## 2022-12-06 PROCEDURE — 83540 ASSAY OF IRON: CPT | Mod: ORL | Performed by: NURSE PRACTITIONER

## 2022-12-06 PROCEDURE — P9604 ONE-WAY ALLOW PRORATED TRIP: HCPCS | Mod: ORL | Performed by: NURSE PRACTITIONER

## 2022-12-06 PROCEDURE — 82607 VITAMIN B-12: CPT | Mod: ORL | Performed by: NURSE PRACTITIONER

## 2022-12-06 PROCEDURE — 84443 ASSAY THYROID STIM HORMONE: CPT | Mod: ORL | Performed by: NURSE PRACTITIONER

## 2022-12-06 PROCEDURE — 36415 COLL VENOUS BLD VENIPUNCTURE: CPT | Mod: ORL | Performed by: NURSE PRACTITIONER

## 2022-12-06 PROCEDURE — 82306 VITAMIN D 25 HYDROXY: CPT | Mod: ORL | Performed by: NURSE PRACTITIONER

## 2022-12-06 PROCEDURE — 80048 BASIC METABOLIC PNL TOTAL CA: CPT | Mod: ORL | Performed by: NURSE PRACTITIONER

## 2022-12-07 ENCOUNTER — ASSISTED LIVING VISIT (OUTPATIENT)
Dept: GERIATRICS | Facility: CLINIC | Age: 82
End: 2022-12-07
Payer: MEDICARE

## 2022-12-07 VITALS — TEMPERATURE: 97.5 F | OXYGEN SATURATION: 90 %

## 2022-12-07 DIAGNOSIS — N18.30 STAGE 3 CHRONIC KIDNEY DISEASE, UNSPECIFIED WHETHER STAGE 3A OR 3B CKD (H): Primary | ICD-10-CM

## 2022-12-07 DIAGNOSIS — E03.9 HYPOTHYROIDISM, UNSPECIFIED TYPE: ICD-10-CM

## 2022-12-07 DIAGNOSIS — U07.1 INFECTION DUE TO 2019 NOVEL CORONAVIRUS: ICD-10-CM

## 2022-12-07 LAB
ANION GAP SERPL CALCULATED.3IONS-SCNC: 20 MMOL/L (ref 7–15)
BUN SERPL-MCNC: 20.3 MG/DL (ref 8–23)
CALCIUM SERPL-MCNC: 9.8 MG/DL (ref 8.8–10.2)
CHLORIDE SERPL-SCNC: 95 MMOL/L (ref 98–107)
CREAT SERPL-MCNC: 1.52 MG/DL (ref 0.67–1.17)
DEPRECATED HCO3 PLAS-SCNC: 18 MMOL/L (ref 22–29)
GFR SERPL CREATININE-BSD FRML MDRD: 45 ML/MIN/1.73M2
GLUCOSE SERPL-MCNC: 87 MG/DL (ref 70–99)
IRON SERPL-MCNC: 43 UG/DL (ref 61–157)
POTASSIUM SERPL-SCNC: 4.4 MMOL/L (ref 3.4–5.3)
SODIUM SERPL-SCNC: 133 MMOL/L (ref 136–145)
TSH SERPL DL<=0.005 MIU/L-ACNC: 0.87 UIU/ML (ref 0.3–4.2)

## 2022-12-07 NOTE — PROGRESS NOTES
[facility] GERIATRICS    Chief Complaint   Patient presents with     RECHECK     HPI:  Luz Elena Sampson is a 82 year old  (1940), who is being seen today for an episodic care visit at: [facility] (S) [[id number]]. Today's concern is:   1. Stage 3 chronic kidney disease, unspecified whether stage 3a or 3b CKD (H)    2. Infection due to 2019 novel coronavirus    3. Hypothyroidism, unspecified type      Patient seen for follow up, pleasant, no distress, ambulatory with walker, newer resident in [place], heart valve in 2021, SBP's in the 110-140 range, covid+ on 11/25 with cough, congestion and lethargy, today lethargy continues, lungs clear, off precautions, intermittent cough with clearance, labs 12/6 B12 [id number], VitaD 50, Fe 43, Creat 1.52, GFR 45, TSH 0.87, VS WNL, appears healthy.    Allergies, and PMH/PSH reviewed in [facility] today.  REVIEW OF SYSTEMS:  4 point ROS including Respiratory, CV, GI and , other than that noted in the HPI,  is negative    Objective:   Temp 97.5  F (36.4  C)   SpO2 90%   GENERAL APPEARANCE:  in no distress, appears healthy  ENT:  Mouth and posterior oropharynx normal, moist mucous membranes  RESP:  lungs clear to auscultation   CV:  peripheral edema mild+ in lower legs  ABDOMEN:  bowel sounds normal  M/S:   Gait and station abnormal using walker  SKIN:  Inspection of skin and subcutaneous tissue baseline  NEURO:   Examination of sensation by touch normal  PSYCH:  affect and mood normal    Labs done in SNF are in [facility]. Please refer to them using [employer]/Care Everywhere.    Assessment/Plan:  (N18.30) Stage 3 chronic kidney disease, unspecified whether stage 3a or 3b CKD (H)  (primary encounter diagnosis)  Comment: labs 12/6 creat 1.52, GFR 45  Plan: dose medications renally as possible  -repeat BMP in 3-6 months    (U07.1) Infection due to 2019 novel coronavirus  Comment: positive 11/25, still lethargic with mild cough  Plan: no medication  intervention indicated  -staff to recheck covid as scheduled      (E03.9) Hypothyroidism, unspecified type  Comment: TSH 0.87  Plan: continue levothyroxine 150mcg  -repeat TSH in 3-6 months  -if result <1.0 plan to decrease medication    MED REC REQUIRED  Post Medication Reconciliation Status: medication reconcilation previously completed during another office visit        Electronically signed by: JITENDRA Freitas CNP

## 2022-12-07 NOTE — LETTER
12/7/2022        RE: Luz Elena Sampson  [facility]  [address] [place] [address]  [place] [address]        No notes on file      Sincerely,        JITENDRA Freitas CNP

## 2022-12-12 ENCOUNTER — TELEPHONE (OUTPATIENT)
Dept: CARDIOLOGY | Facility: CLINIC | Age: 82
End: 2022-12-12

## 2022-12-12 NOTE — TELEPHONE ENCOUNTER
Called Luz Elena because of returned mail received. Cell phone is actually his son Parmjit's phone number. Parmjit requested address be updated to his address and he will bring any letters to his father.     Old address:  [facility]  [address] [facility] [address] [facility] [address]    New Address:  Attn: Parmjit Sampson  [address] [facility] [address]    Reprinted last letter sent.     Breanne ePreira CMA ([facility])

## 2023-01-08 ENCOUNTER — HEALTH MAINTENANCE LETTER (OUTPATIENT)
Age: 83
End: 2023-01-08

## 2023-01-18 ENCOUNTER — ASSISTED LIVING VISIT (OUTPATIENT)
Dept: GERIATRICS | Facility: CLINIC | Age: 83
End: 2023-01-18
Payer: MEDICARE

## 2023-01-18 VITALS
SYSTOLIC BLOOD PRESSURE: 127 MMHG | DIASTOLIC BLOOD PRESSURE: 84 MMHG | WEIGHT: 233.5 LBS | HEART RATE: 60 BPM | BODY MASS INDEX: 31.67 KG/M2 | RESPIRATION RATE: 15 BRPM | TEMPERATURE: 96.6 F | OXYGEN SATURATION: 99 %

## 2023-01-18 DIAGNOSIS — R60.0 BILATERAL LEG EDEMA: ICD-10-CM

## 2023-01-18 DIAGNOSIS — I73.9 PERIPHERAL VASCULAR DISEASE (H): Primary | ICD-10-CM

## 2023-01-18 DIAGNOSIS — I48.19 ATRIAL FIBRILLATION, PERSISTENT (H): ICD-10-CM

## 2023-01-18 DIAGNOSIS — G25.81 RESTLESS LEGS SYNDROME (RLS): ICD-10-CM

## 2023-01-18 PROCEDURE — 99349 HOME/RES VST EST MOD MDM 40: CPT | Performed by: NURSE PRACTITIONER

## 2023-01-18 NOTE — LETTER
1/18/2023        RE: Luz Elena Sampson  Attn Parmjit Sampson  [address]  [place] [address]        No notes on file      Sincerely,        JITENDRA Freitas CNP

## 2023-01-18 NOTE — PROGRESS NOTES
[facility] GERIATRICS    Chief Complaint   Patient presents with     RECHECK     HPI:  Luz Elena Sampson is a 82 year old  (1940), who is being seen today for an episodic care visit at: [facility] (S) [[id number]]. Today's concern is:   1. Peripheral vascular disease (H)    2. Atrial fibrillation, persistent (H)    3. Bilateral leg edema    4. Restless legs syndrome (RLS)      Patient seen on request, has PVD and Afib and now bilateral lower legs edema 3+ lower leg to pedal, firm pitting, mild pain with palpation during exam, states being unable to control even with compression stockings on, on lasix and statin, dose not maintain elevation through daytime, denies CP/palpitations, appears healthy, also RLS not well controlled and wants to change TX regimen.    Allergies, and PMH/PSH reviewed in EPIC today.  REVIEW OF SYSTEMS:  4 point ROS including Respiratory, CV, GI and , other than that noted in the HPI,  is negative    Objective:   /84   Pulse 60   Temp (!) 96.6  F (35.9  C)   Resp 15   Wt 105.9 kg (233 lb 8 oz)   SpO2 99%   BMI 31.67 kg/m    GENERAL APPEARANCE:  in no distress, appears healthy  ENT:  Mouth and posterior oropharynx normal, moist mucous membranes  RESP:  lungs clear to auscultation   CV:  peripheral edema 3+ in lower legs/feet, rate-normal  ABDOMEN:  bowel sounds normal  M/S:   Gait and station abnormal using walker  SKIN:  Inspection of skin and subcutaneous tissue baseline  NEURO:   Examination of sensation by touch normal  PSYCH:  affect and mood normal    Labs done in SNF are in [facility]. Please refer to them using [facility]/Care Everywhere.    Assessment/Plan:  (I73.9) Peripheral vascular disease (H)  (primary encounter diagnosis)  (I48.19) Atrial fibrillation, persistent (H)  (R60.0) Bilateral leg edema  Comment: edema 3+, skin intact, denies CP/palpitations, limited leg vascular status  Plan:   -continue lasix, plavix and statin  -to  place compression bilateral legs on won Qam  -Hgb, Fe, TSH, BMP, BNP on 1/31  -elevate legs as possible  -may need referral to lymph in-clinic if unable to control  -follow up BP's, labs and lasix dosing next week    (G25.81) Restless legs syndrome (RLS)  Comment: not well controlled, requesting dose change  Plan:   -change sinemet to 25-100mg tabs; take 1 tab at bedtime, OK to repeat 1x, OK to have med in apartment and self administer    MED REC REQUIRED  Post Medication Reconciliation Status: medication reconcilation previously completed during another office visit        Electronically signed by: JITENDRA Feritas CNP

## 2023-01-29 ENCOUNTER — LAB REQUISITION (OUTPATIENT)
Dept: LAB | Facility: CLINIC | Age: 83
End: 2023-01-29
Payer: MEDICARE

## 2023-01-29 DIAGNOSIS — N18.9 CHRONIC KIDNEY DISEASE, UNSPECIFIED: ICD-10-CM

## 2023-01-29 DIAGNOSIS — E03.9 HYPOTHYROIDISM, UNSPECIFIED: ICD-10-CM

## 2023-01-29 DIAGNOSIS — D64.9 ANEMIA, UNSPECIFIED: ICD-10-CM

## 2023-01-29 DIAGNOSIS — I50.9 HEART FAILURE, UNSPECIFIED (H): ICD-10-CM

## 2023-02-01 ENCOUNTER — ASSISTED LIVING VISIT (OUTPATIENT)
Dept: GERIATRICS | Facility: CLINIC | Age: 83
End: 2023-02-01
Payer: MEDICARE

## 2023-02-01 VITALS — OXYGEN SATURATION: 91 % | BODY MASS INDEX: 31.67 KG/M2 | WEIGHT: 233.5 LBS | TEMPERATURE: 96.8 F

## 2023-02-01 DIAGNOSIS — G25.81 RESTLESS LEGS SYNDROME (RLS): ICD-10-CM

## 2023-02-01 DIAGNOSIS — N18.30 STAGE 3 CHRONIC KIDNEY DISEASE, UNSPECIFIED WHETHER STAGE 3A OR 3B CKD (H): Primary | ICD-10-CM

## 2023-02-01 DIAGNOSIS — R55 VASOVAGAL SYNCOPE: ICD-10-CM

## 2023-02-01 PROCEDURE — 99349 HOME/RES VST EST MOD MDM 40: CPT | Performed by: NURSE PRACTITIONER

## 2023-02-01 NOTE — LETTER
2/1/2023        RE: Luz Elena Sampson  Attn Parmjit Sampson  [address]  [place] [address]        No notes on file      Sincerely,        JITENDRA Freitas CNP

## 2023-02-01 NOTE — PROGRESS NOTES
[facility] GERIATRICS    Chief Complaint   Patient presents with     RECHECK     HPI:  Luz Elena Sampson is a 82 year old  (1940), who is being seen today for an episodic care visit at: [facility] ([facility]) [[id number]]. Today's concern is:   1. Stage 3 chronic kidney disease, unspecified whether stage 3a or 3b CKD (H)    2. Vasovagal syncope    3. Restless legs syndrome (RLS)      Patient seen for follow up, sent to ER on 1/28-29 for episode of hypoglycemia (BG 60) with probable vasovagal bradycardia in the 30 range, give D10, EKG with Afib and slow ventricular response, ziopatch in Nov 2022 results negative, creat 1.43, today denies vagal issues, ambulating, appears healthy, no distress, /99, HR 64, of note takes sinemet 1-2 tabs at bedtime, facility appears to have added famotidine to pm regimen from another resident by accident, unsure how many doses were taken, appears to not have any detrimental side effects, famotidine was removed and sinemet started as prescribed.    Allergies, and PMH/PSH reviewed in [employer] [facility] today.  REVIEW OF SYSTEMS:  4 point ROS including Respiratory, CV, GI and , other than that noted in the HPI,  is negative    Objective:   Temp 96.8  F (36  C)   Wt 105.9 kg (233 lb 8 oz)   SpO2 91%   BMI 31.67 kg/m    GENERAL APPEARANCE:  in no distress, appears healthy  ENT:  Mouth and posterior oropharynx normal, moist mucous membranes  RESP:  lungs clear to auscultation   CV:  peripheral edema 1-2+ in lower legs/ankles  ABDOMEN:  bowel sounds normal  M/S:   Gait and station abnormal using walker  SKIN:  Inspection of skin and subcutaneous tissue baseline  NEURO:   Examination of sensation by touch normal  PSYCH:  affect and mood normal    Recent labs in [facility] reviewed by me today.     Assessment/Plan:  (N18.30) Stage 3 chronic kidney disease, unspecified whether stage 3a or 3b CKD (H)  (primary encounter diagnosis)  Comment: Creat 1.54 on 1/28  Plan: dose  medications renally as possible  -repeat labs in 1-2 months    (R55) Vasovagal syncope  Comment: ER 1/28-29, low BG, not on DM meds, states eating well, HR's in the 30's  Plan: staff to check VS as scheduled  -consider bowel regimen if syncope repeats  -discontinue [id number], returned ER with PRN order but already on plavix and xarelto    (G25.81) Restless legs syndrome (RLS)  Comment: med error with famotidine instead of sinemet  Plan: staff at facility are investigating issues  -no apparent detrimental affect  -continue sinemet as prescribed, OK to self administer, reviewed med today while in apartment    MED REC REQUIRED  Post Medication Reconciliation Status: medication reconcilation previously completed during another office visit          Electronically signed by: JITENDRA Freitas CNP

## 2023-03-08 ENCOUNTER — ASSISTED LIVING VISIT (OUTPATIENT)
Dept: GERIATRICS | Facility: CLINIC | Age: 83
End: 2023-03-08
Payer: MEDICARE

## 2023-03-08 VITALS — TEMPERATURE: 96.6 F | OXYGEN SATURATION: 97 %

## 2023-03-08 DIAGNOSIS — M62.81 GENERALIZED MUSCLE WEAKNESS: ICD-10-CM

## 2023-03-08 DIAGNOSIS — R47.81 SLURRED SPEECH: ICD-10-CM

## 2023-03-08 DIAGNOSIS — J15.9 BACTERIAL PNEUMONIA: Primary | ICD-10-CM

## 2023-03-08 PROCEDURE — 99349 HOME/RES VST EST MOD MDM 40: CPT | Performed by: NURSE PRACTITIONER

## 2023-03-08 NOTE — LETTER
3/8/2023        RE: Luz Elena Sampson  Attn Parmjit Sampson  [address]  [place] [address]        No notes on file      Sincerely,        JITENDRA Freitas CNP

## 2023-03-08 NOTE — PROGRESS NOTES
[facility] GERIATRICS    Chief Complaint   Patient presents with     RECHECK     HPI:  Luz Elena Sampson is a 82 year old  (1940), who is being seen today for an episodic care visit at: [facility] ([facility]) [[id number]]. Today's concern is:   1. Bacterial pneumonia    2. Generalized muscle weakness    3. Slurred speech      Patient seen for follow up, post hospital 2/28-3/3 for presumed pneumonia, hyponatremia, given ABX and fluid, today seen with nurse and also spoke with therapist working with, episode of increased weakness, not feeling well, speech pattern mild slurring, VS WNL, just wants to return to bed, eval with neuro intact, ambulatory, no s/s CVA/TIA, resolved, continues to have cough with clear lungs, speech improved, afebrile 97.1.    Allergies, and PMH/PSH reviewed in EPIC today.  REVIEW OF SYSTEMS:  4 point ROS including Respiratory, CV, GI and , other than that noted in the HPI,  is negative    Objective:   Temp (!) 96.6  F (35.9  C)   SpO2 97%   GENERAL APPEARANCE:  in no distress, appears healthy  ENT:  Mouth and posterior oropharynx normal, moist mucous membranes  RESP:  lungs clear to auscultation   CV:  peripheral edema 1+ in lower legs  ABDOMEN:  bowel sounds normal  M/S:   Gait and station abnormal using walker  SKIN:  Inspection of skin and subcutaneous tissue baseline  NEURO:   Examination of sensation by touch normal  PSYCH:  affect and mood normal    Labs done in SNF are in [facility]. Please refer to them using [facility]/Care Everywhere.    Assessment/Plan:  (J15.9) Bacterial pneumonia  (primary encounter diagnosis)  (M62.81) Generalized muscle weakness  (R47.81) Slurred speech  Comment: weakness, cough, mild short term slurring of speech, SOB with activity  Plan:   -continue mucinex BID  -CXR ordered; results pending  -discontinue omeprazole, biotin (not taking)  -CBC, BMP, TSH on 3/28 next lab day  -nurse to check VS/status after eating lunch  -L forearm  skin tear; change dressing bi-weekly until healed    MED REC REQUIRED  Post Medication Reconciliation Status: medication reconcilation previously completed during another office visit        Electronically signed by: JITENDRA Freitas CNP

## 2023-03-15 ENCOUNTER — ASSISTED LIVING VISIT (OUTPATIENT)
Dept: GERIATRICS | Facility: CLINIC | Age: 83
End: 2023-03-15
Payer: MEDICARE

## 2023-03-15 VITALS — BODY MASS INDEX: 32.6 KG/M2 | TEMPERATURE: 96.6 F | OXYGEN SATURATION: 94 % | WEIGHT: 240.4 LBS

## 2023-03-15 DIAGNOSIS — I95.9 HYPOTENSION, UNSPECIFIED HYPOTENSION TYPE: ICD-10-CM

## 2023-03-15 DIAGNOSIS — I73.9 PERIPHERAL VASCULAR DISEASE (H): Primary | ICD-10-CM

## 2023-03-15 DIAGNOSIS — R00.1 BRADYCARDIA: ICD-10-CM

## 2023-03-15 DIAGNOSIS — M79.89 SWELLING OF LOWER LEG: ICD-10-CM

## 2023-03-15 DIAGNOSIS — R05.3 CHRONIC COUGH: ICD-10-CM

## 2023-03-15 PROCEDURE — 99349 HOME/RES VST EST MOD MDM 40: CPT | Performed by: NURSE PRACTITIONER

## 2023-03-15 RX ORDER — ALBUTEROL SULFATE 90 UG/1
2 AEROSOL, METERED RESPIRATORY (INHALATION) 4 TIMES DAILY
Qty: 18 G | Refills: 0
Start: 2023-03-15 | End: 2024-09-11

## 2023-03-15 NOTE — PROGRESS NOTES
[facility] GERIATRICS    Chief Complaint   Patient presents with     RECHECK     HPI:  Luz Elena Sampson is a 82 year old  (1940), who is being seen today for an episodic care visit at: [facility] ([facility]) [[id number]]. Today's concern is:   1. Peripheral vascular disease (H)    2. Bradycardia    3. Hypotension, unspecified hypotension type    4. Swelling of lower leg    5. Chronic cough      Patient seen today for follow up, appears euvolemic yet lungs chronic productive cough and edema 2+ not currently wearing TEDs, decreased appetite, was at [facility] 2/28-3/3 with PNE and fluid overload, CXR on 3/9 with faint R upper lobe opacity, could be atelectasis or pneumonia, today 114/74, 97.8, 95%, covid negative, appears to have no acute process, attempting to balance BP's, renal status, fluid balance and chronic cough..    Allergies, and PMH/PSH reviewed in EPIC today.  REVIEW OF SYSTEMS:  4 point ROS including Respiratory, CV, GI and , other than that noted in the HPI,  is negative    Objective:   Temp (!) 96.6  F (35.9  C)   Wt 109 kg (240 lb 6.4 oz)   SpO2 94%   BMI 32.60 kg/m    GENERAL APPEARANCE:  in no distress, appears healthy  ENT:  Mouth and posterior oropharynx normal, moist mucous membranes  RESP:  lungs clear to auscultation   CV:  peripheral edema 2+ in lower legs  ABDOMEN:  bowel sounds normal  M/S:   Gait and station abnormal using walker  SKIN:  Inspection of skin and subcutaneous tissue baseline  NEURO:   Examination of sensation by touch normal  PSYCH:  affect and mood normal    Labs done in SNF are in [facility]. Please refer to them using [employer]/Care Everywhere.    Assessment/Plan:  (I73.9) Peripheral vascular disease (H)  (primary encounter diagnosis)  (R00.1) Bradycardia  (I95.9) Hypotension, unspecified hypotension type  (M79.89) Swelling of lower leg  (R05.3) Chronic cough  Comment: poor LE vascular, edema 2+, compression not on, 114/74, 47 then  97/56, 50, feels weak, tired, limited appetite, productive cough  Plan:   -continue lasix 20mg for fluid balance  -start albuterol HFA with aerochamber QID scheduled  -ordered CBC, BMP, TSH on 3/28  -continue mucinex 600mg BID scheduled  -plan to follow up RE: status and plan after labs return    MED REC REQUIRED  Post Medication Reconciliation Status: medication reconcilation previously completed during another office visit          Electronically signed by: JITENDRA Freitas CNP

## 2023-03-15 NOTE — LETTER
3/15/2023        RE: Luz Elena Sampson  Attn Parmjit Sampson  [address]  [place] [address]        No notes on file      Sincerely,        JITENDRA Freitas CNP

## 2023-03-22 ENCOUNTER — ASSISTED LIVING VISIT (OUTPATIENT)
Dept: GERIATRICS | Facility: CLINIC | Age: 83
End: 2023-03-22
Payer: MEDICARE

## 2023-03-22 VITALS — OXYGEN SATURATION: 98 % | WEIGHT: 240.4 LBS | TEMPERATURE: 96.8 F | BODY MASS INDEX: 32.6 KG/M2

## 2023-03-22 DIAGNOSIS — I50.9 CHRONIC CONGESTIVE HEART FAILURE, UNSPECIFIED HEART FAILURE TYPE (H): Primary | ICD-10-CM

## 2023-03-22 DIAGNOSIS — R60.0 BILATERAL LEG EDEMA: ICD-10-CM

## 2023-03-22 DIAGNOSIS — I10 HYPERTENSION, UNSPECIFIED TYPE: ICD-10-CM

## 2023-03-22 PROCEDURE — 99349 HOME/RES VST EST MOD MDM 40: CPT | Performed by: NURSE PRACTITIONER

## 2023-03-22 NOTE — PROGRESS NOTES
[facility] GERIATRICS    Chief Complaint   Patient presents with     RECHECK     HPI:  Luz Elena Sampson is a 82 year old  (1940), who is being seen today for an episodic care visit at: [facility] ([facility]) [[id number]]. Today's concern is:   1. Chronic congestive heart failure, unspecified heart failure type (H)    2. Bilateral leg edema    3. Hypertension, unspecified type      Patient seen for follow up, reports generally not feeling well, 115/70, 57, afebrile, moderate hypervolemia, leg edema 2+ with wraps on, semi-productive cough with clear lungs, of note was in hospital 2/28-3/3 with pneumonia and fluid overload, would prefer to pull fluid off but SBP's in the 100-120 range and increases fall risk, prefer leg compression placed 24/7 or early am but are on only PRN as patient tolerates, I&O adequate, down to dining area for meals in [facility], transfers self, started albuterol HFA for cough but spacer not available yet.     Allergies, and PMH/PSH reviewed in EPIC today.  REVIEW OF SYSTEMS:  4 point ROS including Respiratory, CV, GI and , other than that noted in the HPI,  is negative    Objective:   Temp 96.8  F (36  C)   Wt 109 kg (240 lb 6.4 oz)   SpO2 98%   BMI 32.60 kg/m    GENERAL APPEARANCE:  in no distress, appears healthy  ENT:  Mouth and posterior oropharynx normal, moist mucous membranes  RESP:  lungs clear to auscultation   CV:  peripheral edema 2+ in lower legs  ABDOMEN:  bowel sounds normal  M/S:   Gait and station abnormal unsteady gait  SKIN:  Inspection of skin and subcutaneous tissue baseline  NEURO:   Examination of sensation by touch normal  PSYCH:  affect and mood normal    Labs done in SNF are in [facility]. Please refer to them using [employer]/Care Everywhere.    Assessment/Plan:  (I50.9) Chronic congestive heart failure, unspecified heart failure type (H)  (primary encounter diagnosis)  (R60.0) Bilateral leg edema  (I10) Hypertension, unspecified  type  Comment: attempting to balance hypervolemia with renal status and hypotension  Plan:   -continue lasix 20mg  -encouraged to have leg compression on in am, elevate as possible  -CBC, BMP, on 3/28  -adding BNP to labs 3/28 to evaluate CHF  -has albuterol HFA, pending spacer deliver to improve efficacy  -plan to follow up after labs results return    MED REC REQUIRED  Post Medication Reconciliation Status: medication reconcilation previously completed during another office visit      Electronically signed by: JITENDRA Freitas CNP

## 2023-03-25 ENCOUNTER — LAB REQUISITION (OUTPATIENT)
Dept: LAB | Facility: CLINIC | Age: 83
End: 2023-03-25
Payer: MEDICARE

## 2023-03-25 DIAGNOSIS — D64.9 ANEMIA, UNSPECIFIED: ICD-10-CM

## 2023-03-25 DIAGNOSIS — N18.9 CHRONIC KIDNEY DISEASE, UNSPECIFIED: ICD-10-CM

## 2023-03-25 DIAGNOSIS — E03.9 HYPOTHYROIDISM, UNSPECIFIED: ICD-10-CM

## 2023-03-29 ENCOUNTER — ASSISTED LIVING VISIT (OUTPATIENT)
Dept: GERIATRICS | Facility: CLINIC | Age: 83
End: 2023-03-29
Payer: MEDICARE

## 2023-03-29 VITALS — BODY MASS INDEX: 32.6 KG/M2 | WEIGHT: 240.4 LBS | OXYGEN SATURATION: 96 % | TEMPERATURE: 97.4 F

## 2023-03-29 DIAGNOSIS — I10 HYPERTENSION, UNSPECIFIED TYPE: ICD-10-CM

## 2023-03-29 DIAGNOSIS — I48.19 ATRIAL FIBRILLATION, PERSISTENT (H): Primary | ICD-10-CM

## 2023-03-29 DIAGNOSIS — R04.0 EPISTAXIS: ICD-10-CM

## 2023-03-29 PROCEDURE — 99349 HOME/RES VST EST MOD MDM 40: CPT | Performed by: NURSE PRACTITIONER

## 2023-03-29 NOTE — PROGRESS NOTES
[facility] GERIATRICS    Chief Complaint   Patient presents with     RECHECK     HPI:  Luz Elena Sampson is a 82 year old  (1940), who is being seen today for an episodic care visit at: [facility] (S) [[id number]]. Today's concern is:   1. Atrial fibrillation, persistent (H)    2. Epistaxis    3. Hypertension, unspecified type      Patient seen for follow up, SBP's low in the 100-120 range, edema bilateral legs 2+, now having epistaxis on almost daily basis and not using mometasone properly to reduce symptoms, appears healthy, states not feeling well and lethargic at times, fairly sedentary, denies CP/palpitations, IRR, I&O adequate.     Allergies, and PMH/PSH reviewed in EPIC today.  REVIEW OF SYSTEMS:  4 point ROS including Respiratory, CV, GI and , other than that noted in the HPI,  is negative    Objective:   Temp 97.4  F (36.3  C)   Wt 109 kg (240 lb 6.4 oz)   SpO2 96%   BMI 32.60 kg/m    GENERAL APPEARANCE:  in no distress, appears healthy  ENT:  Mouth and posterior oropharynx normal, moist mucous membranes  RESP:  lungs clear to auscultation   CV:  peripheral edema 2+ in lower legs  ABDOMEN:  bowel sounds normal  M/S:   Gait and station abnormal using walker/[facility]  SKIN:  Inspection of skin and subcutaneous tissue baseline  NEURO:   Examination of sensation by touch normal  PSYCH:  affect and mood normal    Labs done in SNF are in [facility]. Please refer to them using [facility]/Care Everywhere.    Assessment/Plan:  (I48.19) Atrial fibrillation, persistent (H)  (primary encounter diagnosis)  (R04.0) Epistaxis  Comment: nose bleeding almost daily, nasal spray not used properly, on xarelto and plavix  Plan:   -discontinue plavix  -shown how to use nasonex properly, OK to self administer  -continue xarelto 15mg    (I10) Hypertension, unspecified type  Comment: SBP's in the 100-120 range, attempting to balance renal with edema and hypotension  Plan: continue lasix  20mg  -CBC, BMP, BNP, TSH, Fe, Ferritin on 4/4 (2nd attempt, unable to find on 3/28)  -staff to check VS as scheduled    MED REC REQUIRED  Post Medication Reconciliation Status: medication reconcilation previously completed during another office visit        Electronically signed by: JITENDRA Freitas CNP

## 2023-03-29 NOTE — LETTER
3/29/2023        RE: Luz Elena Sampson  Attn Parmjit Sampson  [address]  [place] [address]        [facility] GERIATRICS    Chief Complaint   Patient presents with     RECHECK     HPI:  Luz Elena Sampson is a 82 year old  (1940), who is being seen today for an episodic care visit at: [facility] ([facility]) [[id number]]. Today's concern is:   1. Atrial fibrillation, persistent (H)    2. Epistaxis    3. Hypertension, unspecified type      Patient seen for follow up, SBP's low in the 100-120 range, edema bilateral legs 2+, now having epistaxis on almost daily basis and not using mometasone properly to reduce symptoms, appears healthy, states not feeling well and lethargic at times, fairly sedentary, denies CP/palpitations, IRR, I&O adequate.     Allergies, and PMH/PSH reviewed in EPIC today.  REVIEW OF SYSTEMS:  4 point ROS including Respiratory, CV, GI and , other than that noted in the HPI,  is negative    Objective:   Temp 97.4  F (36.3  C)   Wt 109 kg (240 lb 6.4 oz)   SpO2 96%   BMI 32.60 kg/m    GENERAL APPEARANCE:  in no distress, appears healthy  ENT:  Mouth and posterior oropharynx normal, moist mucous membranes  RESP:  lungs clear to auscultation   CV:  peripheral edema 2+ in lower legs  ABDOMEN:  bowel sounds normal  M/S:   Gait and station abnormal using walker/[facility]  SKIN:  Inspection of skin and subcutaneous tissue baseline  NEURO:   Examination of sensation by touch normal  PSYCH:  affect and mood normal    Labs done in SNF are in [facility]. Please refer to them using [employer]/Care Everywhere.    Assessment/Plan:  (I48.19) Atrial fibrillation, persistent (H)  (primary encounter diagnosis)  (R04.0) Epistaxis  Comment: nose bleeding almost daily, nasal spray not used properly, on xarelto and plavix  Plan:   -discontinue plavix  -shown how to use nasonex properly, OK to self administer  -continue xarelto 15mg    (I10) Hypertension, unspecified type  Comment: SBP's in the  100-120 range, attempting to balance renal with edema and hypotension  Plan: continue lasix 20mg  -CBC, BMP, BNP, TSH, Fe, Ferritin on 4/4 (2nd attempt, unable to find on 3/28)  -staff to check VS as scheduled    MED REC REQUIRED  Post Medication Reconciliation Status: medication reconcilation previously completed during another office visit        Electronically signed by: JITENDRA Freitas CNP           Sincerely,        JITENDRA Freitas CNP

## 2023-03-31 ENCOUNTER — LAB REQUISITION (OUTPATIENT)
Dept: LAB | Facility: CLINIC | Age: 83
End: 2023-03-31
Payer: MEDICARE

## 2023-03-31 DIAGNOSIS — D64.9 ANEMIA, UNSPECIFIED: ICD-10-CM

## 2023-03-31 DIAGNOSIS — I50.9 HEART FAILURE, UNSPECIFIED (H): ICD-10-CM

## 2023-03-31 DIAGNOSIS — N18.9 CHRONIC KIDNEY DISEASE, UNSPECIFIED: ICD-10-CM

## 2023-04-04 LAB
ANION GAP SERPL CALCULATED.3IONS-SCNC: 13 MMOL/L (ref 7–15)
BUN SERPL-MCNC: 12.5 MG/DL (ref 8–23)
CALCIUM SERPL-MCNC: 9.3 MG/DL (ref 8.8–10.2)
CHLORIDE SERPL-SCNC: 95 MMOL/L (ref 98–107)
CREAT SERPL-MCNC: 1.22 MG/DL (ref 0.67–1.17)
DEPRECATED HCO3 PLAS-SCNC: 19 MMOL/L (ref 22–29)
ERYTHROCYTE [DISTWIDTH] IN BLOOD BY AUTOMATED COUNT: 15.4 % (ref 10–15)
GFR SERPL CREATININE-BSD FRML MDRD: 59 ML/MIN/1.73M2
GLUCOSE SERPL-MCNC: 153 MG/DL (ref 70–99)
HCT VFR BLD AUTO: 34.6 % (ref 40–53)
HGB BLD-MCNC: 11.1 G/DL (ref 13.3–17.7)
MCH RBC QN AUTO: 27.5 PG (ref 26.5–33)
MCHC RBC AUTO-ENTMCNC: 32.1 G/DL (ref 31.5–36.5)
MCV RBC AUTO: 86 FL (ref 78–100)
NT-PROBNP SERPL-MCNC: 2981 PG/ML (ref 0–1800)
PLATELET # BLD AUTO: 183 10E3/UL (ref 150–450)
POTASSIUM SERPL-SCNC: 4.3 MMOL/L (ref 3.4–5.3)
RBC # BLD AUTO: 4.03 10E6/UL (ref 4.4–5.9)
SODIUM SERPL-SCNC: 127 MMOL/L (ref 136–145)
TSH SERPL DL<=0.005 MIU/L-ACNC: 1.42 UIU/ML (ref 0.3–4.2)
WBC # BLD AUTO: 5.3 10E3/UL (ref 4–11)

## 2023-04-04 PROCEDURE — 36415 COLL VENOUS BLD VENIPUNCTURE: CPT | Mod: ORL | Performed by: NURSE PRACTITIONER

## 2023-04-04 PROCEDURE — 83880 ASSAY OF NATRIURETIC PEPTIDE: CPT | Mod: ORL | Performed by: NURSE PRACTITIONER

## 2023-04-04 PROCEDURE — 85027 COMPLETE CBC AUTOMATED: CPT | Mod: ORL | Performed by: NURSE PRACTITIONER

## 2023-04-04 PROCEDURE — 80048 BASIC METABOLIC PNL TOTAL CA: CPT | Mod: ORL | Performed by: NURSE PRACTITIONER

## 2023-04-04 PROCEDURE — 84443 ASSAY THYROID STIM HORMONE: CPT | Mod: ORL | Performed by: NURSE PRACTITIONER

## 2023-04-04 PROCEDURE — P9604 ONE-WAY ALLOW PRORATED TRIP: HCPCS | Mod: ORL | Performed by: NURSE PRACTITIONER

## 2023-04-05 ENCOUNTER — ASSISTED LIVING VISIT (OUTPATIENT)
Dept: GERIATRICS | Facility: CLINIC | Age: 83
End: 2023-04-05
Payer: MEDICARE

## 2023-04-05 VITALS — TEMPERATURE: 97.3 F | WEIGHT: 240.4 LBS | BODY MASS INDEX: 32.6 KG/M2 | OXYGEN SATURATION: 97 %

## 2023-04-05 DIAGNOSIS — E87.1 HYPONATREMIA: ICD-10-CM

## 2023-04-05 DIAGNOSIS — R60.0 BILATERAL LEG EDEMA: ICD-10-CM

## 2023-04-05 DIAGNOSIS — I10 HYPERTENSION, UNSPECIFIED TYPE: ICD-10-CM

## 2023-04-05 DIAGNOSIS — I50.9 CHRONIC CONGESTIVE HEART FAILURE, UNSPECIFIED HEART FAILURE TYPE (H): ICD-10-CM

## 2023-04-05 DIAGNOSIS — N18.30 STAGE 3 CHRONIC KIDNEY DISEASE, UNSPECIFIED WHETHER STAGE 3A OR 3B CKD (H): Primary | ICD-10-CM

## 2023-04-05 PROCEDURE — 99349 HOME/RES VST EST MOD MDM 40: CPT | Performed by: NURSE PRACTITIONER

## 2023-04-05 RX ORDER — SODIUM CHLORIDE 1 G/1
1 TABLET ORAL DAILY
Qty: 1 TABLET | Refills: 0
Start: 2023-04-05 | End: 2023-06-07

## 2023-04-05 NOTE — LETTER
4/5/2023        RE: Luz Elena Sampson  Attn Parmjit Sampson  [address]  [place] [address]        No notes on file      Sincerely,        JITENDRA Freitas CNP

## 2023-04-05 NOTE — PROGRESS NOTES
[facility] GERIATRICS    Chief Complaint   Patient presents with     RECHECK     HPI:  Luz Elena Sampson is a 82 year old  (1940), who is being seen today for an episodic care visit at: [facility] ([facility]) [[id number]]. Today's concern is:   1. Stage 3 chronic kidney disease, unspecified whether stage 3a or 3b CKD (H)    2. Hypertension, unspecified type    3. Bilateral leg edema    4. Chronic congestive heart failure, unspecified heart failure type (H)    5. Hyponatremia      Patient seen for follow up, also called son Parmjit RE: status and plan, patient reports not feeling well often, sleeping more, less active, had ER visit on 1/28 for vasovagal syncope, then hospitalization 2/28-3/3 for pneumonia/hypervolemia, labs on 4/4 in Epic, notable for Na 127, creat 1.22, BNP 2981, today is ambulatory, appears healthy, legs 2+ edema, chronic cough, lungs clear, SBP's in the 110-120 range, ambulatory with walker, explained goal of balancing CHF, edema, renal, low BP's and low Na and that any interventions may cause alternative issue.     Allergies, and PMH/PSH reviewed in [facility] today.  REVIEW OF SYSTEMS:  4 point ROS including Respiratory, CV, GI and , other than that noted in the HPI,  is negative    Objective:   Temp 97.3  F (36.3  C)   Wt 109 kg (240 lb 6.4 oz)   SpO2 97%   BMI 32.60 kg/m    GENERAL APPEARANCE:  in no distress, appears healthy  ENT:  Mouth and posterior oropharynx normal, moist mucous membranes  RESP:  lungs clear to auscultation , no respiratory distress  CV:  peripheral edema 1-2+ in lower legs, rate-normal  ABDOMEN:  bowel sounds normal  M/S:   Gait and station abnormal using walker  SKIN:  Inspection of skin and subcutaneous tissue baseline  NEURO:   Examination of sensation by touch normal  PSYCH:  affect and mood normal    Labs done in SNF are in [facility]. Please refer to them using EPIC/Care Everywhere.    Assessment/Plan:  (N18.30) Stage 3 chronic kidney  disease, unspecified whether stage 3a or 3b CKD (H)  (primary encounter diagnosis)  Comment: 4/4 creat 1.22, GFR 59  Plan: dose medications renally   -repeat BMP in 2-3 months    (I10) Hypertension, unspecified type  (R60.0) Bilateral leg edema  Comment: SBP's 110-120, HR 60-70  Plan: continue compression stockings  -continue lasix  -educated on low BP's and fall risk  -check VS as scheduled    (I50.9) Chronic congestive heart failure, unspecified heart failure type (H)  Comment: BNP on 4/4 was 2981, euvolemic  Plan: staff to check weights monthly  -continue stockings, simvistatin  -follow up cardiology 5/11    (E87.1) Hyponatremia  Comment: Na 127 on 4/4  Plan:   -start Na 1g tab daily, goal to get Na to 130 and not cause increased edema     MED REC REQUIRED  Post Medication Reconciliation Status: medication reconcilation previously completed during another office visit        Electronically signed by: JITENDRA Freitas CNP

## 2023-04-19 ENCOUNTER — TELEPHONE (OUTPATIENT)
Dept: CARDIOLOGY | Facility: CLINIC | Age: 83
End: 2023-04-19
Payer: MEDICARE

## 2023-04-19 NOTE — TELEPHONE ENCOUNTER
Called patient's son to schedule Echo ordered by Dr. Romero. No answer - LVM and sent [employer] message.     Breanne Pereira CMA ([facility])

## 2023-04-23 ENCOUNTER — LAB REQUISITION (OUTPATIENT)
Dept: LAB | Facility: CLINIC | Age: 83
End: 2023-04-23
Payer: MEDICARE

## 2023-04-23 DIAGNOSIS — E03.9 HYPOTHYROIDISM, UNSPECIFIED: ICD-10-CM

## 2023-04-23 DIAGNOSIS — N18.9 CHRONIC KIDNEY DISEASE, UNSPECIFIED: ICD-10-CM

## 2023-04-23 DIAGNOSIS — D64.9 ANEMIA, UNSPECIFIED: ICD-10-CM

## 2023-04-23 DIAGNOSIS — I50.9 HEART FAILURE, UNSPECIFIED (H): ICD-10-CM

## 2023-04-25 LAB
ANION GAP SERPL CALCULATED.3IONS-SCNC: 13 MMOL/L (ref 7–15)
BUN SERPL-MCNC: 18.4 MG/DL (ref 8–23)
CALCIUM SERPL-MCNC: 9.5 MG/DL (ref 8.8–10.2)
CHLORIDE SERPL-SCNC: 99 MMOL/L (ref 98–107)
CREAT SERPL-MCNC: 1.33 MG/DL (ref 0.67–1.17)
DEPRECATED HCO3 PLAS-SCNC: 22 MMOL/L (ref 22–29)
ERYTHROCYTE [DISTWIDTH] IN BLOOD BY AUTOMATED COUNT: 14.3 % (ref 10–15)
FERRITIN SERPL-MCNC: 79 NG/ML (ref 31–409)
GFR SERPL CREATININE-BSD FRML MDRD: 53 ML/MIN/1.73M2
GLUCOSE SERPL-MCNC: 159 MG/DL (ref 70–99)
HCT VFR BLD AUTO: 38.1 % (ref 40–53)
HGB BLD-MCNC: 12.2 G/DL (ref 13.3–17.7)
IRON SERPL-MCNC: 55 UG/DL (ref 61–157)
MCH RBC QN AUTO: 27.9 PG (ref 26.5–33)
MCHC RBC AUTO-ENTMCNC: 32 G/DL (ref 31.5–36.5)
MCV RBC AUTO: 87 FL (ref 78–100)
NT-PROBNP SERPL-MCNC: 3986 PG/ML (ref 0–1800)
PLATELET # BLD AUTO: 233 10E3/UL (ref 150–450)
POTASSIUM SERPL-SCNC: 4.6 MMOL/L (ref 3.4–5.3)
RBC # BLD AUTO: 4.37 10E6/UL (ref 4.4–5.9)
SODIUM SERPL-SCNC: 134 MMOL/L (ref 136–145)
TSH SERPL DL<=0.005 MIU/L-ACNC: 1.05 UIU/ML (ref 0.3–4.2)
WBC # BLD AUTO: 5.6 10E3/UL (ref 4–11)

## 2023-04-25 PROCEDURE — 83540 ASSAY OF IRON: CPT | Mod: ORL | Performed by: NURSE PRACTITIONER

## 2023-04-25 PROCEDURE — 84443 ASSAY THYROID STIM HORMONE: CPT | Mod: ORL | Performed by: NURSE PRACTITIONER

## 2023-04-25 PROCEDURE — 80048 BASIC METABOLIC PNL TOTAL CA: CPT | Mod: ORL | Performed by: NURSE PRACTITIONER

## 2023-04-25 PROCEDURE — 85027 COMPLETE CBC AUTOMATED: CPT | Mod: ORL | Performed by: NURSE PRACTITIONER

## 2023-04-25 PROCEDURE — 36415 COLL VENOUS BLD VENIPUNCTURE: CPT | Mod: ORL | Performed by: NURSE PRACTITIONER

## 2023-04-25 PROCEDURE — 82728 ASSAY OF FERRITIN: CPT | Mod: ORL | Performed by: NURSE PRACTITIONER

## 2023-04-25 PROCEDURE — 83880 ASSAY OF NATRIURETIC PEPTIDE: CPT | Mod: ORL | Performed by: NURSE PRACTITIONER

## 2023-04-26 ENCOUNTER — ASSISTED LIVING VISIT (OUTPATIENT)
Dept: GERIATRICS | Facility: CLINIC | Age: 83
End: 2023-04-26
Payer: MEDICARE

## 2023-04-26 VITALS
HEART RATE: 90 BPM | OXYGEN SATURATION: 99 % | RESPIRATION RATE: 17 BRPM | SYSTOLIC BLOOD PRESSURE: 137 MMHG | DIASTOLIC BLOOD PRESSURE: 82 MMHG | WEIGHT: 235.2 LBS | TEMPERATURE: 97 F | BODY MASS INDEX: 31.9 KG/M2

## 2023-04-26 DIAGNOSIS — I10 HYPERTENSION, UNSPECIFIED TYPE: ICD-10-CM

## 2023-04-26 DIAGNOSIS — I50.9 CHRONIC CONGESTIVE HEART FAILURE, UNSPECIFIED HEART FAILURE TYPE (H): Primary | ICD-10-CM

## 2023-04-26 DIAGNOSIS — N18.30 STAGE 3 CHRONIC KIDNEY DISEASE, UNSPECIFIED WHETHER STAGE 3A OR 3B CKD (H): ICD-10-CM

## 2023-04-26 DIAGNOSIS — E03.9 HYPOTHYROIDISM, UNSPECIFIED TYPE: ICD-10-CM

## 2023-04-26 DIAGNOSIS — R60.0 BILATERAL LEG EDEMA: ICD-10-CM

## 2023-04-26 PROCEDURE — 99349 HOME/RES VST EST MOD MDM 40: CPT | Performed by: NURSE PRACTITIONER

## 2023-04-26 RX ORDER — SODIUM CHLORIDE 1 G/1
1 TABLET ORAL DAILY
Qty: 1 TABLET | Refills: 0
Start: 2023-04-26 | End: 2024-01-17

## 2023-04-26 NOTE — PROGRESS NOTES
[facility] GERIATRICS    Chief Complaint   Patient presents with     RECHECK     HPI:  Luz Elena Sampson is a 82 year old  (1940), who is being seen today for an episodic care visit at: [facility] [[id number]]. Today's concern is:   1. Chronic congestive heart failure, unspecified heart failure type (H)    2. Stage 3 chronic kidney disease, unspecified whether stage 3a or 3b CKD (H)    3. Hypertension, unspecified type    4. Bilateral leg edema    5. Hypothyroidism, unspecified type      Patient seen for follow up, reports not always feeling well, limited activity, uses scooter for distance and walker in apartment, no overt sliding scale/ CHF exacerbation, labs today with BNP 3986, K 4.6, creat 1.33, GFR 53, TSH 1.05, lower legs 2+ firm edema with intact skin, SBP's in the 100 range, no distress, overall normal age related progression.    Allergies, and PMH/PSH reviewed in EPIC today.  REVIEW OF SYSTEMS:  4 point ROS including Respiratory, CV, GI and , other than that noted in the HPI,  is negative    Objective:   /82   Pulse 90   Temp 97  F (36.1  C)   Resp 17   Wt 106.7 kg (235 lb 3.2 oz)   SpO2 99%   BMI 31.90 kg/m    GENERAL APPEARANCE:  in no distress, appears healthy  ENT:  Mouth and posterior oropharynx normal, moist mucous membranes  RESP:  lungs clear to auscultation , no respiratory distress  CV:  peripheral edema 2+ in lower legs, rate-normal  ABDOMEN:  bowel sounds normal  M/S:   Gait and station abnormal walker assist  SKIN:  Inspection of skin and subcutaneous tissue baseline  NEURO:   Examination of sensation by touch normal  PSYCH:  affect and mood normal    Labs done in SNF are in [facility]. Please refer to them using [employer]/Care Everywhere.    Assessment/Plan:  (I50.9) Chronic congestive heart failure, unspecified heart failure type (H)  (primary encounter diagnosis)  (N18.30) Stage 3 chronic kidney disease, unspecified whether stage 3a or  3b CKD (H)  (I10) Hypertension, unspecified type  (R60.0) Bilateral leg edema  Comment: BNP 3986, edema 2+, GFR 53, SBP's 100-120  Plan: continue lasix 20mg, Na 1g, xarelto  -would prefer to increase lasix yet has low BP and independent with transfers, fall risk  -dose medications renally as possible  -follow up Dr. Romero cardiology on 5/11    (E03.9) Hypothyroidism, unspecified type  Comment: TSH today 1.05, goal 306  Plan: decrease levothyroxine from 125 to 100mcg  -follow up TSH in 3-6 months    MED REC REQUIRED  Post Medication Reconciliation Status: medication reconcilation previously completed during another office visit          Electronically signed by: JITENDRA Freitas CNP

## 2023-04-26 NOTE — LETTER
4/26/2023        RE: Luz Elena Sampson  Attyasmany Sampson  [address]  [place] [address]        No notes on file      Sincerely,        JITENDRA Freitas CNP

## 2023-05-09 ENCOUNTER — ANCILLARY PROCEDURE (OUTPATIENT)
Dept: CARDIOLOGY | Facility: CLINIC | Age: 83
End: 2023-05-09
Attending: INTERNAL MEDICINE
Payer: MEDICARE

## 2023-05-09 DIAGNOSIS — I10 HYPERTENSION, UNSPECIFIED TYPE: ICD-10-CM

## 2023-05-09 DIAGNOSIS — I48.19 ATRIAL FIBRILLATION, PERSISTENT (H): ICD-10-CM

## 2023-05-09 DIAGNOSIS — N18.31 STAGE 3A CHRONIC KIDNEY DISEASE (H): ICD-10-CM

## 2023-05-09 DIAGNOSIS — Z95.3 S/P TAVR (TRANSCATHETER AORTIC VALVE REPLACEMENT), BIOPROSTHETIC: ICD-10-CM

## 2023-05-09 DIAGNOSIS — E78.2 MIXED HYPERLIPIDEMIA: ICD-10-CM

## 2023-05-09 DIAGNOSIS — R00.1 BRADYCARDIA: ICD-10-CM

## 2023-05-09 DIAGNOSIS — G47.33 OSA (OBSTRUCTIVE SLEEP APNEA): ICD-10-CM

## 2023-05-09 LAB — LVEF ECHO: NORMAL

## 2023-05-09 PROCEDURE — 93306 TTE W/DOPPLER COMPLETE: CPT | Mod: GC | Performed by: INTERNAL MEDICINE

## 2023-05-11 ENCOUNTER — OFFICE VISIT (OUTPATIENT)
Dept: CARDIOLOGY | Facility: CLINIC | Age: 83
End: 2023-05-11
Payer: MEDICARE

## 2023-05-11 VITALS
WEIGHT: 223 LBS | OXYGEN SATURATION: 99 % | DIASTOLIC BLOOD PRESSURE: 64 MMHG | HEART RATE: 50 BPM | BODY MASS INDEX: 30.2 KG/M2 | HEIGHT: 72 IN | SYSTOLIC BLOOD PRESSURE: 115 MMHG

## 2023-05-11 DIAGNOSIS — I10 HYPERTENSION, UNSPECIFIED TYPE: ICD-10-CM

## 2023-05-11 DIAGNOSIS — E78.2 MIXED HYPERLIPIDEMIA: ICD-10-CM

## 2023-05-11 DIAGNOSIS — R00.1 BRADYCARDIA: ICD-10-CM

## 2023-05-11 DIAGNOSIS — I48.19 ATRIAL FIBRILLATION, PERSISTENT (H): Primary | ICD-10-CM

## 2023-05-11 DIAGNOSIS — N18.31 STAGE 3A CHRONIC KIDNEY DISEASE (H): ICD-10-CM

## 2023-05-11 DIAGNOSIS — G47.33 OSA (OBSTRUCTIVE SLEEP APNEA): ICD-10-CM

## 2023-05-11 DIAGNOSIS — Z95.3 S/P TAVR (TRANSCATHETER AORTIC VALVE REPLACEMENT), BIOPROSTHETIC: ICD-10-CM

## 2023-05-11 DIAGNOSIS — E87.1 HYPONATREMIA: ICD-10-CM

## 2023-05-11 PROCEDURE — 99213 OFFICE O/P EST LOW 20 MIN: CPT | Performed by: INTERNAL MEDICINE

## 2023-05-11 RX ORDER — BIOTIN 1000 MCG
1 TABLET,CHEWABLE ORAL
COMMUNITY
End: 2024-01-17

## 2023-05-11 RX ORDER — METOPROLOL SUCCINATE 25 MG/1
TABLET, EXTENDED RELEASE ORAL
COMMUNITY
Start: 2022-07-18 | End: 2024-01-17

## 2023-05-11 RX ORDER — AMLODIPINE BESYLATE 2.5 MG/1
TABLET ORAL
COMMUNITY
Start: 2022-07-18 | End: 2024-01-17

## 2023-05-11 NOTE — ADDENDUM NOTE
42 years old was doing leg press 5 days ago relatively light weight but he was not familiar with the locking mechanism on the leg press machine and had an accident where his knee went into a flexed position.  He was able to ambulate in fact he walked home about 3 miles from the gym but he did hear a pop at the time of the incident.  Patient also reports a history of arthritis in his knee     Exam shows positive swelling, Lachman testing equivocal, some tenderness the joint line extensor mechanism is intact     X-rays show degenerative changes with joint effusion     Assessment:  Right knee sprain right knee joint effusion arthritis right knee     Plan:  Continue with symptomatic care and knee immobilizer as needed, MRI of the knee, follow-up after that to discuss different treatment options including continued observation and time   Addended by: NELLI PATTON on: 5/11/2023 02:56 PM     Modules accepted: Orders, SmartSet

## 2023-05-11 NOTE — PROGRESS NOTES
I am delighted to see Luz Elena Sampson in consultation.The primary encounter diagnosis was Hyponatremia. Diagnoses of Atrial fibrillation, persistent (H), Hypertension, unspecified type, Bradycardia, S/p TAVR (transcatheter aortic valve replacement), bioprosthetic, Mixed hyperlipidemia, JOSE (obstructive sleep apnea), and Stage 3a chronic kidney disease (H) were also pertinent to this visit.   As you know, the patient is a 82 year old  male. He   has a past medical history of Atrial fibrillation (H), Chronic kidney disease, GERD (gastroesophageal reflux disease), Hyperlipidemia, Hypertension, Hypothyroid, and Psoriasis..    On this visit, the patient states that he has some fatigue that is unchanged.  The patient denies chest pressure/discomfort, dyspnea, near-syncope, syncope, orthopnea, paroxysmal nocturnal dyspnea and lower extermity edema.    The patient's cardiovascular risk factors include arrhythmia, hypertension and high cholesterol.    The following portions of the patient's history were reviewed and updated as appropriate: allergies, current medications, past family history, past medical history, past social history, past surgical history, and the problem list.    PMH: The patient's past medical history includes:    Past Medical History:   Diagnosis Date     Atrial fibrillation (H)      Chronic kidney disease      GERD (gastroesophageal reflux disease)      Hyperlipidemia      Hypertension      Hypothyroid      Psoriasis       Past Surgical History:   Procedure Laterality Date     ARTHRODESIS ANKLE  06/08/2011    Procedure:ARTHRODESIS ANKLE; Subtalar and Tibiotalar Fusion     ; Surgeon:RAMESH WARNER; Location:[facility]     ARTHROPLASTY HIP      right     CATARACT EXTRACTION Bilateral      CHOLECYSTECTOMY       CV TRANSCATHETER AORTIC VALVE REPLACEMENT TRANSAORTIC APPROACH N/A      REMOVE HARDWARE ANKLE  07/10/2012    Procedure: REMOVE HARDWARE ANKLE;  Right Heel Hardware Removal with irrigation  ;   Surgeon: Flaquito Hull MD;  Location: [facility]       The patient's medications as of the current encounter are:     Current Outpatient Medications   Medication Sig Dispense Refill     ACE/ARB/ARNI NOT PRESCRIBED (INTENTIONAL) Please choose reason not prescribed from choices below.       albuterol (PROAIR HFA/PROVENTIL HFA/VENTOLIN HFA) 108 (90 Base) MCG/ACT inhaler Inhale 2 puffs into the lungs 4 times daily 18 g 0     amLODIPine (NORVASC) 2.5 MG tablet TAKE 1 TABLET (2.5 MG) BY MOUTH 1 TIME PER DAY       Biotin 1000 MCG CHEW Take 1 tablet by mouth       carbidopa-levodopa (SINEMET)  MG per tablet Take 1 tablet by mouth At Bedtime And 1 tab daily PRN       cholecalciferol 25 MCG (1000 UT) TABS Take 1 tablet by mouth daily       cyanocobalamin (VITAMIN B-12) 500 MCG tablet Take 500 mcg by mouth daily       Ferrous Gluconate 324 (37.5 Fe) MG TABS Take 1 tablet by mouth every other day       folic acid (FOLVITE) 1 MG tablet Take 1 mg by mouth daily       furosemide (LASIX) 20 MG tablet Take 20 mg by mouth daily       levothyroxine (SYNTHROID/LEVOTHROID) 125 MCG tablet Take 100 mcg by mouth daily       metoprolol succinate ER (TOPROL XL) 25 MG 24 hr tablet TAKE 1/2 TABLET (12.5 MG) BY MOUTH 1 TIME A DAY IN THE EVENING       mometasone (NASONEX) 50 MCG/ACT nasal spray Spray 2 sprays into both nostrils daily       Multiple Vitamins-Minerals (MULTIVITAL PO) Take  by mouth daily.       omeprazole (PRILOSEC) 20 MG DR capsule        ORDER FOR [facility] Wheel[facility]; Right elevating leg rest  [employer]  Phone: [phone]  Diagnosis: Gait Instability related to Right Ankle Fusion 1 Device 0     oxymetazoline (AFRIN) 0.05 % nasal spray Spray 2 sprays into both nostrils 2 times daily as needed for congestion 1 mL 0     rivaroxaban ANTICOAGULANT (XARELTO) 15 MG TABS tablet Take 15 mg by mouth daily (with dinner)       simvastatin (ZOCOR) 20 MG tablet Take 20 mg by mouth daily       sodium chloride 1 GM tablet Take 1 tablet (1  g) by mouth daily 1 tablet 0     sodium chloride 1 GM tablet Take 1 tablet (1 g) by mouth daily 1 tablet 0     tamsulosin (FLOMAX) 0.4 MG capsule Take 0.4 mg by mouth every evening         Labs:     Ancillary Procedure on 05/09/2023   Component Date Value Ref Range Status     LVEF  05/09/2023 60-65%   Final       Allergies:    Allergies   Allergen Reactions     Warfarin Itching     Alopecia     Nsaids Other (See Comments)     Patient avoids due to kidney function       Family History:   Family History   Problem Relation Age of Onset     Cataracts Mother      Dementia Mother      Diabetes Mother      Alzheimer Disease Mother      Hypertension Father      Heart Disease Father      Kidney Disease Father      Diabetes Maternal Grandmother      Diabetes Maternal Grandfather      Spina bifida Brother      Low Back Problems Daughter        Psychosocial history:  reports that he has quit smoking. His smoking use included cigarettes, pipe, and cigars. He has never used smokeless tobacco. He reports current alcohol use. He reports that he does not use drugs.    Review of systems: negative for, exertional chest pain or pressure, paroxysmal nocturnal dyspnea, dyspnea on exertion, orthopnea and syncope or near-syncope    In addition,   General: No change in weight, sleep or appetite.  Normal energy.  No fever or chills  Eyes: Negative for vision changes or eye problems  ENT: No problems with ears, nose or throat.  No difficulty swallowing.  Resp: No coughing, wheezing or shortness of breath  GI: No nausea, vomiting,  heartburn, abdominal pain, diarrhea, constipation or change in bowel habits  : CKD  Musculoskeletal: No significant muscle or joint pains  Neurologic: No headaches, numbness, tingling, weakness, problems with balance or coordination  Psychiatric: No problems with anxiety, depression or mental health  Heme/immune/allergy: No history of bleeding or clotting problems or anemia.    Endocrine: No history of thyroid  disease, diabetes or other endocrine disorders  Skin: No rashes,worrisome lesions or skin problems  Vascular:  No claudication, lifestyle limiting or otherwise; no ischemic rest pain; no non-healing ulcers. No weakness, No loss of sensation         Physical examination  Vitals: /64   Pulse 50   Ht 1.829 m (6')   Wt 101.2 kg (223 lb)   SpO2 99%   BMI 30.24 kg/m    BMI= Body mass index is 30.24 kg/m .    In general, the patient is a pleasant male in no apparent distress.    HEENT: Normiocephalic and atraumatic.  PERRLA.  EOMI.  Sclerae white, not injected.  Nares clear.  Pharynx without erythema or exudate.  Dentition intact.    Neck: No adenopathy.  No thyromegaly. Carotids +2/2 bilaterally without bruits.  No jugular venous distension.   Heart:  The PMI is in the 5th ICS in the midclavicular line. There is no heave. Irreg irreg. Normal S1, S2 splits physiologically. No murmur, rub, click, or gallop.    Lungs: Clear to asculation.  No ronchi, wheezes, rales.  No dullness to percussion.   Abdomen: Soft, nontender, nondistended. No organomegaly. No AAA.  No bruits.   Extremities: No clubbing, cyanosis, or edema. The pulses were intact bilaterally.   Neurological: The neurological examination reveal a patient who was oriented to person, place, and time.  The remainder of the examination was nonfocal.    Cardiac tests include:    11/10/22 - zio - no significant pauses, chronic atrial fib  5/9/23 - echo - normal EF, TAVR functioning well      Assessment and Plan    1. Bradycardia - resolved  - no HR lowing meds  2. S/p TAVR - functioning normally  3. Chronic atrial fibrillation - on anticoagulation  - close to needed renal dose adjustment  4. HTN - on lasix  5. HL - on statin    The patient is to return in 1 year. The patient understood the treatment plan as outlined above.  There were no barriers to learning. Patient accompanied by daughter.      Roge Romero MD

## 2023-05-11 NOTE — PATIENT INSTRUCTIONS
Thank you for coming to the [facility] at [place]; please note the following instructions:    1. Cardiology Providers: Dr. Roge Romero would like you to return for a cardiac follow up in 1 year  (May).  We will contact you regarding your appointment when the time draws closer or you may call [phone] option #1 to arrange an appointment.  Mean while, if you should have any questions or concerns regarding your heart health, please contact us.  Thank you for choosing [facility] for your care.          If you have any questions regarding your visit, please contact your care team:     CARDIOLOGY  TELEPHONE NUMBER   Izzy BAKER., Registered Nurse  Savanah DESAI, Registered Nurse  Phoebe GIBBS, Registered Nurse  Kathi ONOFRE, Registered Medical Assistant  Breanne MARIE, Certified Medical Assistant  Keely DANIEL, Visit Facilitator [phone] (select option 1)    *After hours: [phone]   For Scheduling Appts:     [phone] (select option 1)    *After hours: [phone]   For the Device Clinic (Pacemakers and ICD's)  Antonella LOCKHART, Registered Nurse   During business hours: [phone]    *After business hours:  [phone] (select option 4)      Normal test result notifications will be released via [employer] or mailed within 7 business days.  All other test results, will be communicated via telephone once reviewed by your cardiologist.    If you need a medication refill, please contact your pharmacy.  Please allow 3 business days for your refill to be completed.    As always, thank you for trusting us with your health care needs!

## 2023-05-11 NOTE — NURSING NOTE
Chief Complaint   Patient presents with     Follow Up     Atrial Fib     PAF       Initial /64   Pulse 50   Ht 1.829 m (6')   Wt 101.2 kg (223 lb)   SpO2 99%   BMI 30.24 kg/m   Estimated body mass index is 30.24 kg/m  as calculated from the following:    Height as of this encounter: 1.829 m (6').    Weight as of this encounter: 101.2 kg (223 lb)..  BP completed using cuff size: rody Pereira CMA (DIANA)

## 2023-05-11 NOTE — LETTER
5/11/2023      RE: Luz Elena Sampson  Attn Parmjit Sampson  [address]   [place] [address]       Dear Colleague,    Thank you for the opportunity to participate in the care of your patient, Luz Elena Sampson, at the [facility]. Please see a copy of my visit note below.    I am delighted to see Luz Elena Sampson in consultation.The primary encounter diagnosis was Hyponatremia. Diagnoses of Atrial fibrillation, persistent (H), Hypertension, unspecified type, Bradycardia, S/p TAVR (transcatheter aortic valve replacement), bioprosthetic, Mixed hyperlipidemia, JOSE (obstructive sleep apnea), and Stage 3a chronic kidney disease (H) were also pertinent to this visit.   As you know, the patient is a 82 year old  male. He   has a past medical history of Atrial fibrillation (H), Chronic kidney disease, GERD (gastroesophageal reflux disease), Hyperlipidemia, Hypertension, Hypothyroid, and Psoriasis..    On this visit, the patient states that he has some fatigue that is unchanged.  The patient denies chest pressure/discomfort, dyspnea, near-syncope, syncope, orthopnea, paroxysmal nocturnal dyspnea and lower extermity edema.    The patient's cardiovascular risk factors include arrhythmia, hypertension and high cholesterol.    The following portions of the patient's history were reviewed and updated as appropriate: allergies, current medications, past family history, past medical history, past social history, past surgical history, and the problem list.    PMH: The patient's past medical history includes:    Past Medical History:   Diagnosis Date    Atrial fibrillation (H)     Chronic kidney disease     GERD (gastroesophageal reflux disease)     Hyperlipidemia     Hypertension     Hypothyroid     Psoriasis       Past Surgical History:   Procedure Laterality Date    ARTHRODESIS ANKLE  06/08/2011    Procedure:ARTHRODESIS ANKLE; Subtalar and  Tibiotalar Fusion     ; Surgeon:FLAQUITO WARNER; Location:[facility]    ARTHROPLASTY HIP      right    CATARACT EXTRACTION Bilateral     CHOLECYSTECTOMY      CV TRANSCATHETER AORTIC VALVE REPLACEMENT TRANSAORTIC APPROACH N/A     REMOVE HARDWARE ANKLE  07/10/2012    Procedure: REMOVE HARDWARE ANKLE;  Right Heel Hardware Removal with irrigation  ;  Surgeon: Flaquito Warner MD;  Location: [facility]       The patient's medications as of the current encounter are:     Current Outpatient Medications   Medication Sig Dispense Refill    ACE/ARB/ARNI NOT PRESCRIBED (INTENTIONAL) Please choose reason not prescribed from choices below.      albuterol (PROAIR HFA/PROVENTIL HFA/VENTOLIN HFA) 108 (90 Base) MCG/ACT inhaler Inhale 2 puffs into the lungs 4 times daily 18 g 0    amLODIPine (NORVASC) 2.5 MG tablet TAKE 1 TABLET (2.5 MG) BY MOUTH 1 TIME PER DAY      Biotin 1000 MCG CHEW Take 1 tablet by mouth      carbidopa-levodopa (SINEMET)  MG per tablet Take 1 tablet by mouth At Bedtime And 1 tab daily PRN      cholecalciferol 25 MCG (1000 UT) TABS Take 1 tablet by mouth daily      cyanocobalamin (VITAMIN B-12) 500 MCG tablet Take 500 mcg by mouth daily      Ferrous Gluconate 324 (37.5 Fe) MG TABS Take 1 tablet by mouth every other day      folic acid (FOLVITE) 1 MG tablet Take 1 mg by mouth daily      furosemide (LASIX) 20 MG tablet Take 20 mg by mouth daily      levothyroxine (SYNTHROID/LEVOTHROID) 125 MCG tablet Take 100 mcg by mouth daily      metoprolol succinate ER (TOPROL XL) 25 MG 24 hr tablet TAKE 1/2 TABLET (12.5 MG) BY MOUTH 1 TIME A DAY IN THE EVENING      mometasone (NASONEX) 50 MCG/ACT nasal spray Spray 2 sprays into both nostrils daily      Multiple Vitamins-Minerals (MULTIVITAL PO) Take  by mouth daily.      omeprazole (PRILOSEC) 20 MG DR capsule       ORDER FOR [facility] Wheelchair; Right elevating leg rest  [employer]  Phone: [phone]  Diagnosis: Gait Instability related to Right Ankle Fusion 1 Device 0     oxymetazoline (AFRIN) 0.05 % nasal spray Spray 2 sprays into both nostrils 2 times daily as needed for congestion 1 mL 0    rivaroxaban ANTICOAGULANT (XARELTO) 15 MG TABS tablet Take 15 mg by mouth daily (with dinner)      simvastatin (ZOCOR) 20 MG tablet Take 20 mg by mouth daily      sodium chloride 1 GM tablet Take 1 tablet (1 g) by mouth daily 1 tablet 0    sodium chloride 1 GM tablet Take 1 tablet (1 g) by mouth daily 1 tablet 0    tamsulosin (FLOMAX) 0.4 MG capsule Take 0.4 mg by mouth every evening         Labs:     Ancillary Procedure on 05/09/2023   Component Date Value Ref Range Status    LVEF  05/09/2023 60-65%   Final       Allergies:    Allergies   Allergen Reactions    Warfarin Itching     Alopecia    Nsaids Other (See Comments)     Patient avoids due to kidney function       Family History:   Family History   Problem Relation Age of Onset    Cataracts Mother     Dementia Mother     Diabetes Mother     Alzheimer Disease Mother     Hypertension Father     Heart Disease Father     Kidney Disease Father     Diabetes Maternal Grandmother     Diabetes Maternal Grandfather     Spina bifida Brother     Low Back Problems Daughter        Psychosocial history:  reports that he has quit smoking. His smoking use included cigarettes, pipe, and cigars. He has never used smokeless tobacco. He reports current alcohol use. He reports that he does not use drugs.    Review of systems: negative for, exertional chest pain or pressure, paroxysmal nocturnal dyspnea, dyspnea on exertion, orthopnea and syncope or near-syncope    In addition,   General: No change in weight, sleep or appetite.  Normal energy.  No fever or chills  Eyes: Negative for vision changes or eye problems  ENT: No problems with ears, nose or throat.  No difficulty swallowing.  Resp: No coughing, wheezing or shortness of breath  GI: No nausea, vomiting,  heartburn, abdominal pain, diarrhea, constipation or change in bowel habits  : CKD  Musculoskeletal:  No significant muscle or joint pains  Neurologic: No headaches, numbness, tingling, weakness, problems with balance or coordination  Psychiatric: No problems with anxiety, depression or mental health  Heme/immune/allergy: No history of bleeding or clotting problems or anemia.    Endocrine: No history of thyroid disease, diabetes or other endocrine disorders  Skin: No rashes,worrisome lesions or skin problems  Vascular:  No claudication, lifestyle limiting or otherwise; no ischemic rest pain; no non-healing ulcers. No weakness, No loss of sensation         Physical examination  Vitals: /64   Pulse 50   Ht 1.829 m (6')   Wt 101.2 kg (223 lb)   SpO2 99%   BMI 30.24 kg/m    BMI= Body mass index is 30.24 kg/m .    In general, the patient is a pleasant male in no apparent distress.    HEENT: Normiocephalic and atraumatic.  PERRLA.  EOMI.  Sclerae white, not injected.  Nares clear.  Pharynx without erythema or exudate.  Dentition intact.    Neck: No adenopathy.  No thyromegaly. Carotids +2/2 bilaterally without bruits.  No jugular venous distension.   Heart:  The PMI is in the 5th ICS in the midclavicular line. There is no heave. Irreg irreg. Normal S1, S2 splits physiologically. No murmur, rub, click, or gallop.    Lungs: Clear to asculation.  No ronchi, wheezes, rales.  No dullness to percussion.   Abdomen: Soft, nontender, nondistended. No organomegaly. No AAA.  No bruits.   Extremities: No clubbing, cyanosis, or edema. The pulses were intact bilaterally.   Neurological: The neurological examination reveal a patient who was oriented to person, place, and time.  The remainder of the examination was nonfocal.    Cardiac tests include:    11/10/22 - zio - no significant pauses, chronic atrial fib  5/9/23 - echo - normal EF, TAVR functioning well      Assessment and Plan    1. Bradycardia - resolved  - no HR lowing meds  2. S/p TAVR - functioning normally  3. Chronic atrial fibrillation - on anticoagulation  -  close to needed renal dose adjustment  4. HTN - on lasix  5. HL - on statin    The patient is to return in 1 year. The patient understood the treatment plan as outlined above.  There were no barriers to learning. Patient accompanied by daughter.      Roge Romero MD

## 2023-05-17 ENCOUNTER — ASSISTED LIVING VISIT (OUTPATIENT)
Dept: GERIATRICS | Facility: CLINIC | Age: 83
End: 2023-05-17
Payer: MEDICARE

## 2023-05-17 VITALS
HEART RATE: 90 BPM | OXYGEN SATURATION: 99 % | TEMPERATURE: 97 F | BODY MASS INDEX: 31.9 KG/M2 | RESPIRATION RATE: 17 BRPM | DIASTOLIC BLOOD PRESSURE: 82 MMHG | WEIGHT: 235.2 LBS | SYSTOLIC BLOOD PRESSURE: 137 MMHG

## 2023-05-17 DIAGNOSIS — I48.19 ATRIAL FIBRILLATION, PERSISTENT (H): ICD-10-CM

## 2023-05-17 DIAGNOSIS — N18.30 STAGE 3 CHRONIC KIDNEY DISEASE, UNSPECIFIED WHETHER STAGE 3A OR 3B CKD (H): ICD-10-CM

## 2023-05-17 DIAGNOSIS — I50.9 CHRONIC CONGESTIVE HEART FAILURE, UNSPECIFIED HEART FAILURE TYPE (H): Primary | ICD-10-CM

## 2023-05-17 PROCEDURE — 99349 HOME/RES VST EST MOD MDM 40: CPT | Performed by: NURSE PRACTITIONER

## 2023-05-17 NOTE — PROGRESS NOTES
[facility] GERIATRICS    Chief Complaint   Patient presents with     RECHECK     HPI:  Luz Elena Sampson is a 82 year old  (1940), who is being seen today for an episodic care visit at: [facility] (S) [[id number]]. Today's concern is:   1. Chronic congestive heart failure, unspecified heart failure type (H)    2. Atrial fibrillation, persistent (H)    3. Stage 3 chronic kidney disease, unspecified whether stage 3a or 3b CKD (H)      Patient seen for follow up, also seen by cardiology on 5/11 with NNO, appears healthy, ambulatory, SOB with exertion, recent BNP up to 3986, lungs clear, denies CP, HR RRR, recent creat 1.33, GFR 53, reports not feeling great, states attempting to do some of the exercises that therapy left with him, increased sedentary lifestyle, using scooter for distances.    Allergies, and PMH/PSH reviewed in EPIC today.  REVIEW OF SYSTEMS:  4 point ROS including Respiratory, CV, GI and , other than that noted in the HPI,  is negative    Objective:   /82   Pulse 90   Temp 97  F (36.1  C)   Resp 17   Wt 106.7 kg (235 lb 3.2 oz)   SpO2 99%   BMI 31.90 kg/m    GENERAL APPEARANCE:  in no distress, appears healthy  ENT:  Mouth and posterior oropharynx normal, moist mucous membranes  RESP:  lungs clear to auscultation , no respiratory distress  CV:  peripheral edema 1-2+ in lower legs, rate-normal  ABDOMEN:  bowel sounds normal  M/S:   Gait and station abnormal using walker  SKIN:  Inspection of skin and subcutaneous tissue baseline  NEURO:   Examination of sensation by touch normal  PSYCH:  affect and mood normal    Labs done in SNF are in [facility]. Please refer to them using [facility]/Care Everywhere.    Assessment/Plan:  (I50.9) Chronic congestive heart failure, unspecified heart failure type (H)  (primary encounter diagnosis)  (I48.19) Atrial fibrillation, persistent (H)  (N18.30) Stage 3 chronic kidney disease, unspecified whether stage 3a or 3b CKD  (H)  Comment: BNP 3986, RRR, edema 1-2+, GFR 54  Plan:   -therapy completed, encouraged to do exercises daily  -dose medications renally as possible  -change ventolin to PRN and OK to self administer  -ordered aerochamber for ventolin  -continue lasix, statin, xarelto  -follow up cardiology in 1 year  -plan to check labs in June    MED REC REQUIRED  Post Medication Reconciliation Status: medication reconcilation previously completed during another office visit        Electronically signed by: JITENDRA Freitas CNP

## 2023-05-17 NOTE — LETTER
5/17/2023        RE: Luz Elena Sampson  Attn Parmjit Sampson  [address]  [place] [address]        No notes on file      Sincerely,        JITENDRA Freitas CNP

## 2023-06-01 ENCOUNTER — OFFICE VISIT (OUTPATIENT)
Dept: FAMILY MEDICINE | Facility: CLINIC | Age: 83
End: 2023-06-01
Payer: MEDICARE

## 2023-06-01 VITALS
BODY MASS INDEX: 32.03 KG/M2 | OXYGEN SATURATION: 98 % | DIASTOLIC BLOOD PRESSURE: 59 MMHG | TEMPERATURE: 97.6 F | WEIGHT: 236.2 LBS | SYSTOLIC BLOOD PRESSURE: 126 MMHG | HEART RATE: 55 BPM

## 2023-06-01 DIAGNOSIS — H61.23 BILATERAL IMPACTED CERUMEN: Primary | ICD-10-CM

## 2023-06-01 PROCEDURE — 99213 OFFICE O/P EST LOW 20 MIN: CPT | Performed by: FAMILY MEDICINE

## 2023-06-01 NOTE — PROGRESS NOTES
Assessment & Plan     Bilateral impacted cerumen  Ear wash done by MA  Left canal normal   Right -hard wax   Advised use cerumenex otc and follow up if not able to get the wax out  Courtney Vitale MD  [facility] BARBARA Laguna is a 82 year old, presenting for the following health issues:  Patient Request (Ear cleaning)        6/1/2023     2:13 PM   Additional Questions   Roomed by Lisa   Accompanied by Daughter     YESENIA     Requesting both ears to be looked at but left ear has felt plugged for a while    Pt has had This Problems before  No Runny nose or sore Throat        Review of Systems   Rest of the ROS is Negative except see above and Problem list [stable]        Objective    /59   Pulse 55   Temp 97.6  F (36.4  C) (Oral)   Wt 107.1 kg (236 lb 3.2 oz)   SpO2 98%   BMI 32.03 kg/m    Body mass index is 32.03 kg/m .  Physical Exam   GENERAL: healthy, alert and no distress  EYES: Eyes grossly normal to inspection  HENT: normal cephalic/atraumatic, right ear: occluded with wax, left ear: occluded with wax, nose and mouth without ulcers or lesions, oropharynx clear and oral mucous membranes moist  NECK: no adenopathy, no asymmetry, masses, or scars and thyroid normal to palpation

## 2023-06-07 ENCOUNTER — ASSISTED LIVING VISIT (OUTPATIENT)
Dept: GERIATRICS | Facility: CLINIC | Age: 83
End: 2023-06-07
Payer: MEDICARE

## 2023-06-07 VITALS
BODY MASS INDEX: 31.87 KG/M2 | HEART RATE: 54 BPM | SYSTOLIC BLOOD PRESSURE: 103 MMHG | WEIGHT: 235 LBS | OXYGEN SATURATION: 88 % | TEMPERATURE: 96.8 F | RESPIRATION RATE: 12 BRPM | DIASTOLIC BLOOD PRESSURE: 47 MMHG

## 2023-06-07 DIAGNOSIS — I50.9 CHRONIC CONGESTIVE HEART FAILURE, UNSPECIFIED HEART FAILURE TYPE (H): Primary | ICD-10-CM

## 2023-06-07 DIAGNOSIS — L84 CALLUS OF TOE: ICD-10-CM

## 2023-06-07 DIAGNOSIS — R60.0 BILATERAL LEG EDEMA: ICD-10-CM

## 2023-06-07 PROCEDURE — 99349 HOME/RES VST EST MOD MDM 40: CPT | Performed by: NURSE PRACTITIONER

## 2023-06-07 NOTE — PROGRESS NOTES
[facility] GERIATRICS    Chief Complaint   Patient presents with     RECHECK     HPI:  Luz Elena Sampson is a 82 year old  (1940), who is being seen today for an episodic care visit at: [facility] (S) [[id number]]. Today's concern is:   1. Chronic congestive heart failure, unspecified heart failure type (H)    2. Bilateral leg edema    3. Callus of toe      Patient seen on request, new report of discharge from L great toe, has moderate CHF with recent BNP 3968, lungs clear, edema 2-3+ in bilateral legs and pedal, SBP's trend lower in the 100-120 range but needs diuretic to maintain fluid balance, now L posterior great toe had callus fall off and now small 0.5cm diameter open area surrounded by firm crusty tissue, no overt s/s infection, no discharge, unable to expectorate any fluid, pedal pulses unable to palpate, is ambulatory, wears compression, overall appears healthy.    Allergies, and PMH/PSH reviewed in EPIC today.  REVIEW OF SYSTEMS:  4 point ROS including Respiratory, CV, GI and , other than that noted in the HPI,  is negative    Objective:   /47   Pulse 54   Temp 96.8  F (36  C)   Resp 12   Wt 106.6 kg (235 lb)   SpO2 (!) 88%   BMI 31.87 kg/m    GENERAL APPEARANCE:  in no distress, appears healthy  ENT:  Mouth and posterior oropharynx normal, moist mucous membranes  RESP:  lungs clear to auscultation   CV:  peripheral edema 2-3+ in lower legs/pedal, rate-normal  ABDOMEN:  bowel sounds normal  M/S:   Gait and station abnormal using walker  SKIN:  Inspection of skin and subcutaneous tissue baseline  NEURO:   Examination of sensation by touch normal  PSYCH:  affect and mood normal    Labs done in SNF are in [facility]. Please refer to them using [facility]/Care Everywhere.    Assessment/Plan:  (I50.9) Chronic congestive heart failure, unspecified heart failure type (H)  (primary encounter diagnosis)  (R60.0) Bilateral leg edema  (L84) Callus of toe  Comment:  limited circulation, edema 2-3+, open sore on posterior L great toe, no SOB nor s/s CHF exacerbation  Plan:   -continue lasix, OK for permissive soft BP's  -continue xarelto and statin  -L toe; apply thin layer betadyne at bedtime  -referral to in-house podiatry next time in building  -continue compression and elevation  -plan to follow up next week for quinn galdamez and order labs due in June    MED REC REQUIRED  Post Medication Reconciliation Status: medication reconcilation previously completed during another office visit        Electronically signed by: JITENDRA Freitas CNP

## 2023-06-07 NOTE — LETTER
6/7/2023        RE: Luz Elena Sampson  Attn Parmjit Sampson  [address]  [place] [address]        No notes on file      Sincerely,        JITENDRA Freitas CNP

## 2023-06-14 ENCOUNTER — ASSISTED LIVING VISIT (OUTPATIENT)
Dept: GERIATRICS | Facility: CLINIC | Age: 83
End: 2023-06-14
Payer: MEDICARE

## 2023-06-14 VITALS
DIASTOLIC BLOOD PRESSURE: 56 MMHG | RESPIRATION RATE: 16 BRPM | BODY MASS INDEX: 31.87 KG/M2 | HEART RATE: 60 BPM | SYSTOLIC BLOOD PRESSURE: 90 MMHG | WEIGHT: 235 LBS | OXYGEN SATURATION: 98 % | TEMPERATURE: 97.7 F

## 2023-06-14 DIAGNOSIS — H61.23 BILATERAL IMPACTED CERUMEN: ICD-10-CM

## 2023-06-14 DIAGNOSIS — I50.9 CHRONIC CONGESTIVE HEART FAILURE, UNSPECIFIED HEART FAILURE TYPE (H): Primary | ICD-10-CM

## 2023-06-14 DIAGNOSIS — L84 CALLUS OF TOE: ICD-10-CM

## 2023-06-14 PROCEDURE — 69210 REMOVE IMPACTED EAR WAX UNI: CPT | Mod: RT | Performed by: NURSE PRACTITIONER

## 2023-06-14 PROCEDURE — 99348 HOME/RES VST EST LOW MDM 30: CPT | Mod: 25 | Performed by: NURSE PRACTITIONER

## 2023-06-14 NOTE — LETTER
6/14/2023        RE: Luz Elena Sampson  Attn Parmjit Sampson  [address]  [place] [address]        No notes on file      Sincerely,        JITENDRA Freitas CNP

## 2023-06-14 NOTE — PROGRESS NOTES
[facility] GERIATRICS    Chief Complaint   Patient presents with     RECHECK     HPI:  Luz Elena Sampson is a 82 year old  (1940), who is being seen today for an episodic care visit at: No question data found.. Today's concern is:   1. Chronic congestive heart failure, unspecified heart failure type (H)    2. Bilateral impacted cerumen    3. Callus of toe      Patient seen for follow up, mild s/s CHF with PACO 3+, skin intact stockings not on, /81, BNP 3968, lungs clear, is ambulatory short distance with limited tolerance and unsteady gait, complaint of earwax, exam today R ear possible block, L ear 100% firm blockage, used lighted curette to clean R ear completely of soft ceruman and able to visualize shiny silver TM, attempts to clear L ear difficult as ear canal sensitive and blockage will not budge even at outer borders, also exam of L great toe callus, started betadyne and soft/open area now dry but is cracking and has bled a small amount a few times, is wearing shoes, takes own stockings on/off, no distress.      Allergies, and PMH/PSH reviewed in EPIC today.  REVIEW OF SYSTEMS:  4 point ROS including Respiratory, CV, GI and , other than that noted in the HPI,  is negative    Objective:   BP 90/56   Pulse 60   Temp 97.7  F (36.5  C)   Resp 16   Wt 106.6 kg (235 lb)   SpO2 98%   BMI 31.87 kg/m    GENERAL APPEARANCE:  in no distress, appears healthy  ENT:  Mouth and posterior oropharynx normal, moist mucous membranes  RESP:  lungs clear to auscultation , no respiratory distress  CV:  peripheral edema 3+ in lower legs, rate-normal  ABDOMEN:  bowel sounds normal  M/S:   Gait and station abnormal using walker  SKIN:  Inspection of skin and subcutaneous tissue baseline  NEURO:   Examination of sensation by touch normal  PSYCH:  affect and mood normal    Labs done in SNF are in [facility]. Please refer to them using [employer]/Care Everywhere.    Assessment/Plan:  (I50.9) Chronic congestive heart  failure, unspecified heart failure type (H)  (primary encounter diagnosis)  Comment: edema 3+, activity intolerance, balancing renal, heart, BP's and edema  Plan:   -place compression stockings on early am  -elevate legs as possible  -continue lasix 20mg (recent K was 4.6)  -BMP, BNP, TSH, A1C on 6/27    (H61.23) Bilateral impacted cerumen  Comment: procedure as above, tolerated fairly well  Plan: REMOVE IMPACTED CERUMEN  -debrox 5gtt L ear BID x4 days then flush  -plan to follow up on L ear plug in 1-2 weeks    (L84) Callus of toe  Comment: dried out now, skin cracks and intermittent bleeding  Plan:   -continue betadyne to area  -completed podiatry referral form today  -follow up podiatry next time in building, if will be >2-3 weeks may trim callus and nails prior to appt    MED REC REQUIRED  Post Medication Reconciliation Status: medication reconcilation previously completed during another office visit        Electronically signed by: JITENDRA Freitas CNP

## 2023-06-16 ENCOUNTER — OFFICE VISIT (OUTPATIENT)
Dept: OPHTHALMOLOGY | Facility: CLINIC | Age: 83
End: 2023-06-16
Payer: MEDICARE

## 2023-06-16 DIAGNOSIS — Z96.1 PSEUDOPHAKIA OF BOTH EYES: Primary | ICD-10-CM

## 2023-06-16 DIAGNOSIS — H52.4 PRESBYOPIA: ICD-10-CM

## 2023-06-16 DIAGNOSIS — H35.62 RETINAL HEMORRHAGE OF LEFT EYE: ICD-10-CM

## 2023-06-16 DIAGNOSIS — H43.813 POSTERIOR VITREOUS DETACHMENT OF BOTH EYES: ICD-10-CM

## 2023-06-16 DIAGNOSIS — Z01.01 ENCOUNTER FOR EXAMINATION OF EYES AND VISION WITH ABNORMAL FINDINGS: ICD-10-CM

## 2023-06-16 PROCEDURE — 92004 COMPRE OPH EXAM NEW PT 1/>: CPT | Performed by: OPHTHALMOLOGY

## 2023-06-16 PROCEDURE — 92015 DETERMINE REFRACTIVE STATE: CPT | Mod: GY | Performed by: OPHTHALMOLOGY

## 2023-06-16 ASSESSMENT — CONF VISUAL FIELD
OD_INFERIOR_NASAL_RESTRICTION: 0
OS_NORMAL: 1
OS_SUPERIOR_NASAL_RESTRICTION: 0
OD_SUPERIOR_NASAL_RESTRICTION: 0
OD_SUPERIOR_TEMPORAL_RESTRICTION: 0
OS_INFERIOR_NASAL_RESTRICTION: 0
OS_INFERIOR_TEMPORAL_RESTRICTION: 0
OD_NORMAL: 1
OD_INFERIOR_TEMPORAL_RESTRICTION: 0
OS_SUPERIOR_TEMPORAL_RESTRICTION: 0

## 2023-06-16 ASSESSMENT — REFRACTION_WEARINGRX
OD_SPHERE: -0.50
OD_AXIS: 007
OS_AXIS: 007
SPECS_TYPE: BIFOCAL
OS_ADD: +2.75
OS_SPHERE: -0.50
OS_CYLINDER: +1.25
OD_ADD: +2.75
OD_CYLINDER: +0.75

## 2023-06-16 ASSESSMENT — VISUAL ACUITY
OS_PH_CC: 20/20
CORRECTION_TYPE: GLASSES
OD_PH_CC: 20/30
OS_PH_CC+: -2
OS_CC: 20/40
OD_CC+: -2
OD_PH_CC+: -2
METHOD: SNELLEN - LINEAR
OD_CC: 20/50

## 2023-06-16 ASSESSMENT — REFRACTION_MANIFEST
OS_CYLINDER: +0.50
OD_SPHERE: -1.50
OD_ADD: +2.75
OS_ADD: +2.75
OS_SPHERE: -1.00
OD_CYLINDER: SPHERE
OS_AXIS: 178

## 2023-06-16 ASSESSMENT — TONOMETRY
IOP_METHOD: ICARE
OD_IOP_MMHG: 10
OS_IOP_MMHG: 10

## 2023-06-16 NOTE — PROGRESS NOTES
" Current Eye Medications:  None     Subjective:  Here for complete eye exam. Last eye exam was 1 year ago. S/p PCIOL both eyes. Vision is good at distance and near with both eyes. Patient states that he wears prescription glasses rarely.      Objective:  See Ophthalmology Exam.       Assessment:  Baseline eye exam in patient who is pseudophakic.      ICD-10-CM    1. Pseudophakia of both eyes  Z96.1 EYE EXAM (SIMPLE-NONBILLABLE)      2. Retinal hemorrhage of left eye  H35.62       3. Posterior vitreous detachment of both eyes  H43.813       4. Encounter for examination of eyes and vision with abnormal findings  Z01.01 EYE EXAM (SIMPLE-NONBILLABLE)     REFRACTION      5. Presbyopia  H52.4 REFRACTION           Plan:  Glasses prescription given - optional  May use artificial tears up to four times a day (like Refresh Optive, Systane Balance, TheraTears, or generic artificial tears are ok. Avoid \"get the red out\" drops).   Possible clouding of posterior capsule both eyes discussed.   Call in February 2024 for an appointment in June 2024 for Complete Exam    Dr. Huffman [phone]         "

## 2023-06-16 NOTE — PATIENT INSTRUCTIONS
"Glasses prescription given - optional  May use artificial tears up to four times a day (like Refresh Optive, Systane Balance, TheraTears, or generic artificial tears are ok. Avoid \"get the red out\" drops).   Possible clouding of posterior capsule both eyes discussed.   Call in February 2024 for an appointment in June 2024 for Complete Exam    Dr. Huffman [phone]    "

## 2023-06-16 NOTE — LETTER
"    6/16/2023         RE: Luz Elena Sampson  Attn Parmjit Sampson  [address]   [place] [address]        Dear Colleague,    Thank you for referring your patient, Luz Elena Sampson, to the [facility]. Please see a copy of my visit note below.     Current Eye Medications:  None     Subjective:  Here for complete eye exam. Last eye exam was 1 year ago. S/p PCIOL both eyes. Vision is good at distance and near with both eyes. Patient states that he wears prescription glasses rarely.      Objective:  See Ophthalmology Exam.       Assessment:  Baseline eye exam in patient who is pseudophakic.      ICD-10-CM    1. Pseudophakia of both eyes  Z96.1 EYE EXAM (SIMPLE-NONBILLABLE)      2. Retinal hemorrhage of left eye  H35.62       3. Posterior vitreous detachment of both eyes  H43.813       4. Encounter for examination of eyes and vision with abnormal findings  Z01.01 EYE EXAM (SIMPLE-NONBILLABLE)     REFRACTION      5. Presbyopia  H52.4 REFRACTION           Plan:  Glasses prescription given - optional  May use artificial tears up to four times a day (like Refresh Optive, Systane Balance, TheraTears, or generic artificial tears are ok. Avoid \"get the red out\" drops).   Possible clouding of posterior capsule both eyes discussed.   Call in February 2024 for an appointment in June 2024 for Complete Exam    Dr. Huffman [phone]             Again, thank you for allowing me to participate in the care of your patient.        Sincerely,        Patrick Huffman MD    "

## 2023-06-17 PROBLEM — H35.62 RETINAL HEMORRHAGE OF LEFT EYE: Status: ACTIVE | Noted: 2023-06-17

## 2023-06-17 PROBLEM — Z96.1 PSEUDOPHAKIA OF BOTH EYES: Status: ACTIVE | Noted: 2023-06-17

## 2023-06-17 ASSESSMENT — CUP TO DISC RATIO
OS_RATIO: 0.4
OD_RATIO: 0.3

## 2023-06-17 ASSESSMENT — SLIT LAMP EXAM - LIDS
COMMENTS: HIGH CREASE, 1+ PTOSIS
COMMENTS: HIGH CREASE, 1+ PTOSIS

## 2023-06-17 ASSESSMENT — EXTERNAL EXAM - LEFT EYE: OS_EXAM: 3+ BROW PTOSIS

## 2023-06-17 ASSESSMENT — EXTERNAL EXAM - RIGHT EYE: OD_EXAM: 3+ BROW PTOSIS

## 2023-06-21 ENCOUNTER — ASSISTED LIVING VISIT (OUTPATIENT)
Dept: GERIATRICS | Facility: CLINIC | Age: 83
End: 2023-06-21
Payer: MEDICARE

## 2023-06-21 VITALS
DIASTOLIC BLOOD PRESSURE: 70 MMHG | SYSTOLIC BLOOD PRESSURE: 142 MMHG | RESPIRATION RATE: 16 BRPM | HEART RATE: 70 BPM | WEIGHT: 235 LBS | BODY MASS INDEX: 31.87 KG/M2 | TEMPERATURE: 97.2 F | OXYGEN SATURATION: 96 %

## 2023-06-21 DIAGNOSIS — L84 CALLUS OF TOE: ICD-10-CM

## 2023-06-21 DIAGNOSIS — H61.22 IMPACTED CERUMEN OF LEFT EAR: ICD-10-CM

## 2023-06-21 DIAGNOSIS — L60.3 NAIL DYSTROPHY: Primary | ICD-10-CM

## 2023-06-21 DIAGNOSIS — R60.0 BILATERAL LEG EDEMA: ICD-10-CM

## 2023-06-21 PROCEDURE — G0127 TRIM NAIL(S): HCPCS | Mod: Q8 | Performed by: NURSE PRACTITIONER

## 2023-06-21 PROCEDURE — 99349 HOME/RES VST EST MOD MDM 40: CPT | Mod: 25 | Performed by: NURSE PRACTITIONER

## 2023-06-21 NOTE — PROGRESS NOTES
[facility] GERIATRICS    Chief Complaint   Patient presents with     RECHECK     HPI:  Luz Elena Sampson is a 82 year old  (1940), who is being seen today for an episodic care visit at: [facility] (S) [[id number]]. Today's concern is:   1. Nail dystrophy    2. Callus of toe    3. Impacted cerumen of left ear    4. Bilateral leg edema      Patient seen for follow up, Hx of CHF with BNP 3986, toenails yellow, thick and dystrophic, podiatry not available until August, also callus posterior L great toe approx 2cm diameter drying up and peeling, used side-cutter and trimmed all 10 digits plus filed thick nails round to not catch on stockings, also trimmed callus edges that were peeling out, tolerated well, no residual bleeding, callus intact, still using betadyne, also L ear previous firm cerumen now softer with debrox, used curette to clean out as patient would tolerate, still residual cerumen, also leg edema 2-3+ and wearing compression with limited elevation, skin intact/thierry, no weeping.    Allergies, and PMH/PSH reviewed in EPIC today.  REVIEW OF SYSTEMS:  4 point ROS including Respiratory, CV, GI and , other than that noted in the HPI,  is negative    Objective:   BP (!) 142/70   Pulse 70   Temp 97.2  F (36.2  C)   Resp 16   Wt 106.6 kg (235 lb)   SpO2 96%   BMI 31.87 kg/m    GENERAL APPEARANCE:  in no distress, appears healthy  ENT:  Mouth and posterior oropharynx normal, moist mucous membranes  RESP:  lungs clear to auscultation , no respiratory distress  CV:  peripheral edema 2-3+ in lower legs/ankles/pedal, rate-normal  ABDOMEN:  bowel sounds normal  M/S:   Gait and station abnormal using walker  SKIN:  Inspection of skin and subcutaneous tissue baseline  NEURO:   Examination of sensation by touch normal  PSYCH:  affect and mood normal    Labs done in SNF are in [facility]. Please refer to them using [employer]/Care Everywhere.    Assessment/Plan:  (L60.3) Nail  dystrophy  (primary encounter diagnosis)  (L84) Callus of toe  Comment: long thick nails, callus peeling at edges  Plan: trimmed all 10 digits plus callus today  -follow up in-house podiatry in August    (H61.22) Impacted cerumen of left ear  Comment: R ear clear, L ear soft cerumen blockage  Plan: cleaned out L ear as would tolerate  -staff nurse to irrigate warm water today    (R60.0) Bilateral leg edema  Comment: 2-3+, skin intact, dependent much of day  Plan: instructed to wear stockings 24/7 as tolerates  -encourage elevation  -continue lasix 20mg; caution with SBP's 100-130 range and fall risk  -BMP, BNP on 6/27    MED REC REQUIRED  Post Medication Reconciliation Status: medication reconcilation previously completed during another office visit      Electronically signed by: JITENDRA Freitas CNP

## 2023-06-21 NOTE — LETTER
6/21/2023        RE: Luz Elena Sampson  Attn Parmjit Sampson  [address]  [place] [address]        No notes on file      Sincerely,        JITENDRA Freitas CNP

## 2023-06-23 ENCOUNTER — LAB REQUISITION (OUTPATIENT)
Dept: LAB | Facility: CLINIC | Age: 83
End: 2023-06-23
Payer: MEDICARE

## 2023-06-23 DIAGNOSIS — N18.9 CHRONIC KIDNEY DISEASE, UNSPECIFIED: ICD-10-CM

## 2023-06-23 DIAGNOSIS — E11.9 TYPE 2 DIABETES MELLITUS WITHOUT COMPLICATIONS (H): ICD-10-CM

## 2023-06-23 DIAGNOSIS — I50.9 HEART FAILURE, UNSPECIFIED (H): ICD-10-CM

## 2023-06-27 LAB
ANION GAP SERPL CALCULATED.3IONS-SCNC: 13 MMOL/L (ref 7–15)
BUN SERPL-MCNC: 13.3 MG/DL (ref 8–23)
CALCIUM SERPL-MCNC: 9.3 MG/DL (ref 8.8–10.2)
CHLORIDE SERPL-SCNC: 99 MMOL/L (ref 98–107)
CREAT SERPL-MCNC: 1.33 MG/DL (ref 0.67–1.17)
DEPRECATED HCO3 PLAS-SCNC: 22 MMOL/L (ref 22–29)
GFR SERPL CREATININE-BSD FRML MDRD: 53 ML/MIN/1.73M2
GLUCOSE SERPL-MCNC: 129 MG/DL (ref 70–99)
HBA1C MFR BLD: 5.6 %
NT-PROBNP SERPL-MCNC: 3561 PG/ML (ref 0–1800)
POTASSIUM SERPL-SCNC: 4.5 MMOL/L (ref 3.4–5.3)
SODIUM SERPL-SCNC: 134 MMOL/L (ref 136–145)
TSH SERPL DL<=0.005 MIU/L-ACNC: 4.87 UIU/ML (ref 0.3–4.2)

## 2023-06-27 PROCEDURE — 83880 ASSAY OF NATRIURETIC PEPTIDE: CPT | Mod: ORL | Performed by: NURSE PRACTITIONER

## 2023-06-27 PROCEDURE — P9604 ONE-WAY ALLOW PRORATED TRIP: HCPCS | Mod: ORL | Performed by: NURSE PRACTITIONER

## 2023-06-27 PROCEDURE — 83036 HEMOGLOBIN GLYCOSYLATED A1C: CPT | Mod: ORL | Performed by: NURSE PRACTITIONER

## 2023-06-27 PROCEDURE — 36415 COLL VENOUS BLD VENIPUNCTURE: CPT | Mod: ORL | Performed by: NURSE PRACTITIONER

## 2023-06-27 PROCEDURE — 80048 BASIC METABOLIC PNL TOTAL CA: CPT | Mod: ORL | Performed by: NURSE PRACTITIONER

## 2023-06-27 PROCEDURE — 84443 ASSAY THYROID STIM HORMONE: CPT | Mod: ORL | Performed by: NURSE PRACTITIONER

## 2023-06-28 ENCOUNTER — ASSISTED LIVING VISIT (OUTPATIENT)
Dept: GERIATRICS | Facility: CLINIC | Age: 83
End: 2023-06-28
Payer: MEDICARE

## 2023-06-28 VITALS
OXYGEN SATURATION: 96 % | WEIGHT: 235 LBS | HEART RATE: 70 BPM | BODY MASS INDEX: 31.87 KG/M2 | RESPIRATION RATE: 16 BRPM | TEMPERATURE: 97.2 F | DIASTOLIC BLOOD PRESSURE: 70 MMHG | SYSTOLIC BLOOD PRESSURE: 142 MMHG

## 2023-06-28 DIAGNOSIS — I50.9 CHRONIC CONGESTIVE HEART FAILURE, UNSPECIFIED HEART FAILURE TYPE (H): Primary | ICD-10-CM

## 2023-06-28 DIAGNOSIS — I10 HYPERTENSION, UNSPECIFIED TYPE: ICD-10-CM

## 2023-06-28 DIAGNOSIS — N18.30 STAGE 3 CHRONIC KIDNEY DISEASE, UNSPECIFIED WHETHER STAGE 3A OR 3B CKD (H): ICD-10-CM

## 2023-06-28 DIAGNOSIS — R60.0 BILATERAL LEG EDEMA: ICD-10-CM

## 2023-06-28 PROCEDURE — 99349 HOME/RES VST EST MOD MDM 40: CPT | Performed by: NURSE PRACTITIONER

## 2023-06-28 NOTE — LETTER
6/28/2023        RE: Luz Elena Sampson  Attyasmany Sampson  [address]  [place] [address]        No notes on file      Sincerely,        JITENDRA Freitas CNP

## 2023-06-28 NOTE — PROGRESS NOTES
[facility] GERIATRICS    Chief Complaint   Patient presents with     RECHECK     HPI:  Luz Elena Sampson is a 82 year old  (1940), who is being seen today for an episodic care visit at: [facility] ([facility]) [[id number]]. Today's concern is:   1. Chronic congestive heart failure, unspecified heart failure type (H)    2. Stage 3 chronic kidney disease, unspecified whether stage 3a or 3b CKD (H)    3. Bilateral leg edema    4. Hypertension, unspecified type      Patient seen for follow up, labs today BNP 3561, creat 1.33, /74, edema 2-3+ with poor circulation, is ambulatory, oriented, reports feeling OK, attempting to balance CHF with renal function and edema and BP control, appears to have moderate balance today.    Allergies, and PMH/PSH reviewed in EPIC today.  REVIEW OF SYSTEMS:  4 point ROS including Respiratory, CV, GI and , other than that noted in the HPI,  is negative    Objective:   BP (!) 142/70   Pulse 70   Temp 97.2  F (36.2  C)   Resp 16   Wt 106.6 kg (235 lb)   SpO2 96%   BMI 31.87 kg/m    GENERAL APPEARANCE:  in no distress, appears healthy  ENT:  Mouth and posterior oropharynx normal, moist mucous membranes  RESP:  lungs clear to auscultation , no respiratory distress  CV:  peripheral edema 2-3+ in lower legs, rate-normal  ABDOMEN:  bowel sounds normal  M/S:   Gait and station abnormal using walker  SKIN:  Inspection of skin and subcutaneous tissue baseline  NEURO:   Examination of sensation by touch normal  PSYCH:  affect and mood normal    Labs done in SNF are in [facility]. Please refer to them using [facility]/Care Everywhere.    Assessment/Plan:  (I50.9) Chronic congestive heart failure, unspecified heart failure type (H)  (primary encounter diagnosis)  Comment: BNP 3561, no SOB, edema+  Plan: continue lasix 20mg, Na 1g, statin, xarelto  -staff to support as indicated  -would increase lasix but BP would drop and creat would increase    (N18.30) Stage 3  chronic kidney disease, unspecified whether stage 3a or 3b CKD (H)  Comment: creat 1.33, GFR 53  Plan: dose medications renally as possible  -recheck BMP in 3-6 months    (R60.0) Bilateral leg edema  Comment: 2-3+, purple toes, poor circulation, skin intact  Plan: to wear compression 24/7 as possible  -continue lasix 20mg/day    (I10) Hypertension, unspecified type  Comment: SBP's in the 100-140 range, ambulatory, fall risk  Plan: continue lasix 20mg only  -if SBP's increase to 130+ range consider increasing lasix  -change lasix to bumex if renal status decllines    MED REC REQUIRED  Post Medication Reconciliation Status: medication reconcilation previously completed during another office visit          Electronically signed by: JITENDRA Freitas CNP

## 2023-07-19 ENCOUNTER — ASSISTED LIVING VISIT (OUTPATIENT)
Dept: GERIATRICS | Facility: CLINIC | Age: 83
End: 2023-07-19
Payer: MEDICARE

## 2023-07-19 VITALS
DIASTOLIC BLOOD PRESSURE: 70 MMHG | HEART RATE: 70 BPM | WEIGHT: 235 LBS | BODY MASS INDEX: 31.87 KG/M2 | TEMPERATURE: 97.2 F | RESPIRATION RATE: 16 BRPM | SYSTOLIC BLOOD PRESSURE: 142 MMHG | OXYGEN SATURATION: 96 %

## 2023-07-19 DIAGNOSIS — H61.22 IMPACTED CERUMEN OF LEFT EAR: ICD-10-CM

## 2023-07-19 DIAGNOSIS — L84 CALLUS OF TOE: ICD-10-CM

## 2023-07-19 DIAGNOSIS — R60.0 BILATERAL LEG EDEMA: ICD-10-CM

## 2023-07-19 DIAGNOSIS — I73.9 PERIPHERAL VASCULAR DISEASE (H): Primary | ICD-10-CM

## 2023-07-19 PROCEDURE — 99349 HOME/RES VST EST MOD MDM 40: CPT | Performed by: NURSE PRACTITIONER

## 2023-07-19 NOTE — PROGRESS NOTES
[facility] GERIATRICS    Chief Complaint   Patient presents with     RECHECK     HPI:  Luz Elena Sampson is a 82 year old  (1940), who is being seen today for an episodic care visit at: [facility] (S) [[id number]]. Today's concern is:   1. Peripheral vascular disease (H)    2. Bilateral leg edema    3. Callus of toe    4. Impacted cerumen of left ear      Patient seen for follow up, moderate PVD with limited circulation, edema 2-3+ not well controlled, newer callus on L great toe, using betadyne to dry up but now patient using betadyne on whole bottom of foot, adamant to apply himself, bottom of foot now dry with multiple fissures forming, no s/s infection, also report of L ear still feeling full, hearing intact, visual exam still has dark cerumen patch but unable to reach due to ear sensitivity.     Allergies, and PMH/PSH reviewed in EPIC today.  REVIEW OF SYSTEMS:  4 point ROS including Respiratory, CV, GI and , other than that noted in the HPI,  is negative    Objective:   BP (!) 142/70   Pulse 70   Temp 97.2  F (36.2  C)   Resp 16   Wt 106.6 kg (235 lb)   SpO2 96%   BMI 31.87 kg/m    GENERAL APPEARANCE:  in no distress, appears healthy  ENT:  Mouth and posterior oropharynx normal, moist mucous membranes  RESP:  lungs clear to auscultation , no respiratory distress  CV:  peripheral edema 2-3+ in lower legs, rate-normal  ABDOMEN:  bowel sounds normal  M/S:   Gait and station abnormal using walker  SKIN:  Inspection of skin and subcutaneous tissueas in HPI  NEURO:   Examination of sensation by touch normal  PSYCH:  affect and mood normal    Labs done in SNF are in [place] EPIC. Please refer to them using EPIC/Care Everywhere.    Assessment/Plan:  (I73.9) Peripheral vascular disease (H)  (primary encounter diagnosis)  (R60.0) Bilateral leg edema  (L84) Callus of toe  Comment: limited circulation, legs in dependent position, skin intact, edema 2-3+, toe callus trimmed  and smaller  Plan:   -educated on betadyne application to callus bottom of great toe only  -to wear leg compression 24/7 as possible and change daily  -OK to continue self administration of betadyne, other meds setup and taking appropriately  -podiatry appt in August in-house    (H61.22) Impacted cerumen of left ear  Comment: recent clearing and debrox, L ear still dark cerumen present  Plan: OK restart debrox L ear only x4 days  -if not resolving may need mechanical removal with sedation due to sensitivities    MED REC REQUIRED  Post Medication Reconciliation Status: medication reconcilation previously completed during another office visit        Electronically signed by: JITENDRA Freitas CNP

## 2023-07-19 NOTE — LETTER
7/19/2023        RE: Luz Elena Sampson  Attn Parmjit Sampson  [address]  [place] [address]        No notes on file      Sincerely,        JITENDRA Freitas CNP

## 2023-08-08 VITALS
BODY MASS INDEX: 31.87 KG/M2 | DIASTOLIC BLOOD PRESSURE: 70 MMHG | HEART RATE: 70 BPM | TEMPERATURE: 97.2 F | SYSTOLIC BLOOD PRESSURE: 142 MMHG | RESPIRATION RATE: 16 BRPM | OXYGEN SATURATION: 96 % | WEIGHT: 235 LBS

## 2023-08-09 ENCOUNTER — ASSISTED LIVING VISIT (OUTPATIENT)
Dept: GERIATRICS | Facility: CLINIC | Age: 83
End: 2023-08-09
Payer: MEDICARE

## 2023-08-09 DIAGNOSIS — B35.3 TINEA PEDIS OF LEFT FOOT: ICD-10-CM

## 2023-08-09 DIAGNOSIS — I73.9 PERIPHERAL VASCULAR DISEASE (H): Primary | ICD-10-CM

## 2023-08-09 DIAGNOSIS — R60.0 BILATERAL LEG EDEMA: ICD-10-CM

## 2023-08-09 PROCEDURE — 99349 HOME/RES VST EST MOD MDM 40: CPT | Performed by: NURSE PRACTITIONER

## 2023-08-09 RX ORDER — KETOCONAZOLE 20 MG/G
CREAM TOPICAL 2 TIMES DAILY
Qty: 2 G | Refills: 0 | Status: SHIPPED | OUTPATIENT
Start: 2023-08-09 | End: 2024-04-08

## 2023-08-09 RX ORDER — KETOCONAZOLE 20 MG/G
CREAM TOPICAL 2 TIMES DAILY
Qty: 2 G | Refills: 0
Start: 2023-08-09 | End: 2023-08-09

## 2023-08-09 NOTE — PROGRESS NOTES
[facility] GERIATRICS    Chief Complaint   Patient presents with    RECHECK     HPI:  Luz Elena Sampson is a 82 year old  (1940), who is being seen today for an episodic care visit at: [facility] (S) [[id number]]. Today's concern is:   1. Peripheral vascular disease (H)    2. Bilateral leg edema    3. Tinea pedis of left foot      Patient seen for follow up, PVD+ with edema 2-3+, thierry, cool toes, PP negative, legs in dependent position much of waking hours, ambulatory with unsteady gait, new tinea L foot where great toe posterior callus and fissure located, mild improvement after podiatry appt, other skin scaly, denies pain.    Allergies, and PMH/PSH reviewed in EPIC today.  REVIEW OF SYSTEMS:  4 point ROS including Respiratory, CV, GI and , other than that noted in the HPI,  is negative    Objective:   BP (!) 142/70   Pulse 70   Temp 97.2  F (36.2  C)   Resp 16   Wt 106.6 kg (235 lb)   SpO2 96%   BMI 31.87 kg/m    GENERAL APPEARANCE:  in no distress, appears healthy  ENT:  Mouth and posterior oropharynx normal, moist mucous membranes  RESP:  lungs clear to auscultation , no respiratory distress  CV:  peripheral edema 2-3+ in lower legs, pedal, rate-normal  ABDOMEN:  bowel sounds normal  M/S:   Gait and station abnormal unsteady gait  SKIN:  Inspection of skin and subcutaneous tissueas in HPI  NEURO:   Examination of sensation by touch normal  PSYCH:  affect and mood normal    Labs done in SNF are in [facility]. Please refer to them using [facility]/Care Everywhere.    Assessment/Plan:  (I73.9) Peripheral vascular disease (H)  (primary encounter diagnosis)  (R60.0) Bilateral leg edema  (B35.3) Tinea pedis of left foot  Comment: chronic PVD and edema, not vascular surgery consideration, tinea+  Plan:   -start ketoconazole  left foot bottom BID  -placed on podiatry list for next in-house visit  -discontinue bacitracin for fissure that podiatry ordered  -consider amlactin  lotion for scales after ketoconazole Tx is completed  -PT/OT ordered, adding SN for foot monitoring    MED REC REQUIRED  Post Medication Reconciliation Status: medication reconcilation previously completed during another office visit        Electronically signed by: JITENDRA Freitas CNP         Documentation of Face-to-Face and Certification for Home Health Services     Patient: Luz Elena Sampson   YOB: 1940  MR Number: [id number]  Today's Date: 8/9/2023    I certify that patient: Luz Elena Sampson is under my care and that I, or a nurse practitioner or physician's assistant working with me, had a face-to-face encounter that meets the physician face-to-face encounter requirements with this patient on: 8/9/2023.    This encounter with the patient was in whole, or in part, for the following medical condition, which is the primary reason for home health care:   1. Peripheral vascular disease (H)    2. Bilateral leg edema    3. Tinea pedis of left foot          I certify that, based on my findings, the following services are medically necessary home health services: Nursing, Occupational Therapy, and Physical Therapy.    My clinical findings support the need for the above services because: Nurse is needed: To provide caregiver training to assist with: wound care.., Occupational Therapy Services are needed to assess and treat cognitive ability and address ADL safety due to impairment in transfers., and Physical Therapy Services are needed to assess and treat the following functional impairments: unstable gait.    Further, I certify that my clinical findings support that this patient is homebound (i.e. absences from home require considerable and taxing effort and are for medical reasons or [employer] services or infrequently or of short duration when for other reasons) because: Requires assistance of another person or specialized equipment to access medical services because patient: Is unable to operate  assistive equipment on their own...    Based on the above findings. I certify that this patient is confined to the home and needs intermittent skilled nursing care, physical therapy and/or speech therapy.  The patient is under my care, and I have initiated the establishment of the plan of care.  This patient will be followed by a physician who will periodically review the plan of care.  Physician/Provider to provide follow up care: Chandler Oconnell    Responsible Medicare certified P[employer] Physician: Chandler Oconnell NP  Electronic Physician Signature  Date: 8/23/2023

## 2023-08-09 NOTE — LETTER
8/9/2023        RE: Luz Elena Sampson  Attn Parmjit Sampson  [address]  [place] [address]        No notes on file      Sincerely,        JITENDRA Freitas CNP

## 2023-08-23 ENCOUNTER — ASSISTED LIVING VISIT (OUTPATIENT)
Dept: GERIATRICS | Facility: CLINIC | Age: 83
End: 2023-08-23
Payer: MEDICARE

## 2023-08-23 VITALS
TEMPERATURE: 97.2 F | WEIGHT: 235 LBS | OXYGEN SATURATION: 96 % | SYSTOLIC BLOOD PRESSURE: 142 MMHG | HEART RATE: 70 BPM | DIASTOLIC BLOOD PRESSURE: 70 MMHG | BODY MASS INDEX: 31.87 KG/M2 | RESPIRATION RATE: 16 BRPM

## 2023-08-23 DIAGNOSIS — I73.9 PERIPHERAL VASCULAR DISEASE (H): Primary | ICD-10-CM

## 2023-08-23 DIAGNOSIS — L84 CALLUS OF TOE: ICD-10-CM

## 2023-08-23 DIAGNOSIS — B35.3 TINEA PEDIS OF LEFT FOOT: ICD-10-CM

## 2023-08-23 DIAGNOSIS — K52.9 GASTROENTERITIS: ICD-10-CM

## 2023-08-23 PROCEDURE — 99349 HOME/RES VST EST MOD MDM 40: CPT | Performed by: NURSE PRACTITIONER

## 2023-08-23 NOTE — PROGRESS NOTES
[facility] GERIATRICS    Chief Complaint   Patient presents with    RECHECK     HPI:  Luz Elena Sampson is a 82 year old  (1940), who is being seen today for an episodic care visit at: [facility] ([facility]) [[id number]]. Today's concern is:   1. Peripheral vascular disease (H)    2. Tinea pedis of left foot    3. Callus of toe    4. Gastroenteritis      Patient seen for follow up, over weekend had nausea and diarrhea, no emesis, 'full blowouts' per patient, mild dehydration, reports appetite as returned and intake appropriate, also moderate PVD with leg edema, rashy foot skin, healing callus L great toe, previously bleeding plantar fissure now without discharge, intermittent pain, using compression daily, overall appears healthy.    Allergies, and PMH/PSH reviewed in EPIC today.  REVIEW OF SYSTEMS:  4 point ROS including Respiratory, CV, GI and , other than that noted in the HPI,  is negative    Objective:   BP (!) 142/70   Pulse 70   Temp 97.2  F (36.2  C)   Resp 16   Wt 106.6 kg (235 lb)   SpO2 96%   BMI 31.87 kg/m    GENERAL APPEARANCE:  in no distress, appears healthy  ENT:  Mouth and posterior oropharynx normal, moist mucous membranes  RESP:  lungs clear to auscultation   CV:  regular rate and rhythm, no murmur, rub, or gallop, peripheral edema 2-3+ in lower legs  ABDOMEN:  bowel sounds normal  M/S:   Gait and station abnormal using walker  SKIN:  Inspection of skin and subcutaneous tissue baseline  NEURO:   Examination of sensation by touch normal  PSYCH:  affect and mood normal    Labs done in SNF are in [facility]. Please refer to them using [facility]/Care Everywhere.    Assessment/Plan:  (I73.9) Peripheral vascular disease (H)  (primary encounter diagnosis)  (B35.3) Tinea pedis of left foot  (L84) Callus of toe  Comment: leg/pedal edema 2-3+, tinea+, callus posterior great toe  Plan:   -PT/OT/SN via [facility] care started  -continue ketoconazole L foot/toe BID  -wear  compression days, night also if tolerating  -elevate as possible    (K52.9) Gastroenteritis  Comment: over weekend diarrhea x2-3 days  Plan: self resolving, probable norovirus   -facility sent out flier to residents RE: handwashing importance and instructions as other residents ill also    MED REC REQUIRED  Post Medication Reconciliation Status: medication reconcilation previously completed during another office visit          Electronically signed by: JITENDRA Freitas CNP

## 2023-08-23 NOTE — LETTER
8/23/2023        RE: Luz Elena Sampson  Attn Parmjit Sampson  [address]  [place] [address]        No notes on file      Sincerely,        JITENDRA Freitas CNP

## 2023-09-10 DIAGNOSIS — Z53.9 DIAGNOSIS NOT YET DEFINED: Primary | ICD-10-CM

## 2023-09-10 PROCEDURE — G0180 MD CERTIFICATION HHA PATIENT: HCPCS | Performed by: NURSE PRACTITIONER

## 2023-09-13 ENCOUNTER — ASSISTED LIVING VISIT (OUTPATIENT)
Dept: GERIATRICS | Facility: CLINIC | Age: 83
End: 2023-09-13
Payer: MEDICARE

## 2023-09-13 VITALS
BODY MASS INDEX: 31.87 KG/M2 | SYSTOLIC BLOOD PRESSURE: 142 MMHG | HEART RATE: 70 BPM | WEIGHT: 235 LBS | DIASTOLIC BLOOD PRESSURE: 70 MMHG | OXYGEN SATURATION: 96 % | RESPIRATION RATE: 16 BRPM | TEMPERATURE: 97.2 F

## 2023-09-13 DIAGNOSIS — I73.9 PERIPHERAL VASCULAR DISEASE (H): Primary | ICD-10-CM

## 2023-09-13 DIAGNOSIS — L89.152 PRESSURE ULCER OF COCCYGEAL REGION, STAGE 2 (H): ICD-10-CM

## 2023-09-13 DIAGNOSIS — R60.0 BILATERAL LEG EDEMA: ICD-10-CM

## 2023-09-13 PROCEDURE — 99349 HOME/RES VST EST MOD MDM 40: CPT | Performed by: NURSE PRACTITIONER

## 2023-09-13 NOTE — LETTER
9/13/2023        RE: Luz Elena Sampson  Attn Parmjit Sampson  [address]  [place] [address]        No notes on file      Sincerely,        JITENDRA Freitas CNP

## 2023-09-13 NOTE — PROGRESS NOTES
[facility] GERIATRICS    Chief Complaint   Patient presents with    RECHECK     HPI:  Luz Elena Sampson is a 82 year old  (1940), who is being seen today for an episodic care visit at: [facility] (S) [[id number]]. Today's concern is:   1. Peripheral vascular disease (H)    2. Bilateral leg edema    3. Pressure ulcer of coccygeal region, stage 2 (H)      Patient seen for follow up, moderate PVD with edema 2-3+ in legs and pedal, decreased sensation, thierry with blanchable skin, L foot callus and fissure improving, not always complaint with stockings and elevation, also having buttock pain, exam with PU S1 approx 1.2x0.2cm at coxxyx in buttock crease with no drainage, overall appears healthy.    Allergies, and PMH/PSH reviewed in EPIC today.  REVIEW OF SYSTEMS:  4 point ROS including Respiratory, CV, GI and , other than that noted in the HPI,  is negative    Objective:   BP (!) 142/70   Pulse 70   Temp 97.2  F (36.2  C)   Resp 16   Wt 106.6 kg (235 lb)   SpO2 96%   BMI 31.87 kg/m    GENERAL APPEARANCE:  in no distress, appears healthy  ENT:  Mouth and posterior oropharynx normal, moist mucous membranes  RESP:  lungs clear to auscultation , no respiratory distress  CV:  peripheral edema 2-3+ in lower legs, rate-normal  ABDOMEN:  bowel sounds normal  M/S:   Gait and station abnormal using walker  SKIN:  Inspection of skin and subcutaneous tissueas in HPI  NEURO:   Examination of sensation by touch normal  PSYCH:  affect and mood normal    Labs done in SNF are in [facility]. Please refer to them using [facility]/Care Everywhere.    Assessment/Plan:  (I73.9) Peripheral vascular disease (H)  (primary encounter diagnosis)  (R60.0) Bilateral leg edema  Comment: edema 2-3+, thierry, blanchable  Plan:   -continue aquaphor bilateral legs and feet BID  -elevate and wear stockings 24/7 unless ambulating  -PT/OT working with    (L89.152) Pressure ulcer of coccygeal region, stage 2  (H)  Comment: newer 1.2x0.2 open epidermal layer  Plan:   -start barrier cream BID; OK to self administer  -OT to eval seating  -SN to eval ulcer and make suggestions/support plan    MED REC REQUIRED  Post Medication Reconciliation Status: medication reconcilation previously completed during another office visit          Electronically signed by: JITENDRA Freitas CNP

## 2023-10-04 ENCOUNTER — ASSISTED LIVING VISIT (OUTPATIENT)
Dept: GERIATRICS | Facility: CLINIC | Age: 83
End: 2023-10-04
Payer: MEDICARE

## 2023-10-04 VITALS
TEMPERATURE: 97.2 F | OXYGEN SATURATION: 96 % | DIASTOLIC BLOOD PRESSURE: 70 MMHG | RESPIRATION RATE: 16 BRPM | SYSTOLIC BLOOD PRESSURE: 142 MMHG | HEART RATE: 70 BPM | WEIGHT: 235 LBS | BODY MASS INDEX: 31.87 KG/M2

## 2023-10-04 DIAGNOSIS — I10 HYPERTENSION, UNSPECIFIED TYPE: ICD-10-CM

## 2023-10-04 DIAGNOSIS — N18.30 STAGE 3 CHRONIC KIDNEY DISEASE, UNSPECIFIED WHETHER STAGE 3A OR 3B CKD (H): ICD-10-CM

## 2023-10-04 DIAGNOSIS — R60.0 BILATERAL LEG EDEMA: ICD-10-CM

## 2023-10-04 DIAGNOSIS — I50.9 CHRONIC CONGESTIVE HEART FAILURE, UNSPECIFIED HEART FAILURE TYPE (H): Primary | ICD-10-CM

## 2023-10-04 PROCEDURE — 99349 HOME/RES VST EST MOD MDM 40: CPT | Performed by: NURSE PRACTITIONER

## 2023-10-04 NOTE — LETTER
10/4/2023        RE: Luz Elena Sampson  Attn Parmjit Sampson  [address]  [place] [address]        No notes on file      Sincerely,        JITENDRA Freitas CNP

## 2023-10-04 NOTE — PROGRESS NOTES
[facility] GERIATRICS    Chief Complaint   Patient presents with    RECHECK     HPI:  Luz Elena Sampson is a 83 year old  (1940), who is being seen today for an episodic care visit at: [facility] (S) [[id number]]. Today's concern is:   1. Chronic congestive heart failure, unspecified heart failure type (H)    2. Bilateral leg edema    3. Stage 3 chronic kidney disease, unspecified whether stage 3a or 3b CKD (H)    4. Hypertension, unspecified type      Patient seen for follow up, BNP 3561, creat 1.33, edema 3-4+ bilateral legs and pedal, SBP's in the 110-140 range, ambulatory, good appetite, deconditioned, attempting to balance hypervolemia and edema with renal status and low BP's at times placing ar risk for falling, overall oriented and compliant with meds and elevation, denies anything but generalized pain, down for meals with scooter, L foot callus improved, no distress.    Allergies, and PMH/PSH reviewed in EPIC today.  REVIEW OF SYSTEMS:  4 point ROS including Respiratory, CV, GI and , other than that noted in the HPI,  is negative    Objective:   BP (!) 142/70   Pulse 70   Temp 97.2  F (36.2  C)   Resp 16   Wt 106.6 kg (235 lb)   SpO2 96%   BMI 31.87 kg/m    GENERAL APPEARANCE:  in no distress, appears healthy  ENT:  Mouth and posterior oropharynx normal, moist mucous membranes  RESP:  lungs clear to auscultation , no respiratory distress  CV:  peripheral edema 3-4+ in lower legs/ankle/pedal, rate-normal  ABDOMEN:  bowel sounds normal  M/S:   Gait and station abnormal unsteady gait  SKIN:  Inspection of skin and subcutaneous tissue baseline  NEURO:   Examination of sensation by touch normal  PSYCH:  affect and mood normal    Labs done in SNF are in [facility]. Please refer to them using EPIC/Care Everywhere.    Assessment/Plan:  (I50.9) Chronic congestive heart failure, unspecified heart failure type (H)  (primary encounter diagnosis)  (R60.0) Bilateral leg  edema  (N18.30) Stage 3 chronic kidney disease, unspecified whether stage 3a or 3b CKD (H)  (I10) Hypertension, unspecified type  Comment: hypervolemic, edema 3-4+, creat 1.33, BNP 3561, SBP's 110-140  Plan:   -continue lasix 20/day  -staff to check VS as scheduled  -BMP, BNP, TSH on 10/17  -consider discontinue NaCl tabs pending labs 10/17  -consider changing lasix to bumex pending lab results  -SN, PT/OT still working with  -OK note on door RE: do not enter prior to 9am  -plan to follow up labs and status in 2 weeks    MED REC REQUIRED  Post Medication Reconciliation Status: medication reconcilation previously completed during another office visit        Electronically signed by: JITENDRA Freitas CNP

## 2023-10-16 ENCOUNTER — LAB REQUISITION (OUTPATIENT)
Dept: LAB | Facility: CLINIC | Age: 83
End: 2023-10-16
Payer: MEDICARE

## 2023-10-16 DIAGNOSIS — N18.9 CHRONIC KIDNEY DISEASE, UNSPECIFIED: ICD-10-CM

## 2023-10-16 DIAGNOSIS — E03.9 HYPOTHYROIDISM, UNSPECIFIED: ICD-10-CM

## 2023-10-16 DIAGNOSIS — I50.9 HEART FAILURE, UNSPECIFIED (H): ICD-10-CM

## 2023-10-17 LAB
ANION GAP SERPL CALCULATED.3IONS-SCNC: 12 MMOL/L (ref 7–15)
BUN SERPL-MCNC: 17.2 MG/DL (ref 8–23)
CALCIUM SERPL-MCNC: 9 MG/DL (ref 8.8–10.2)
CHLORIDE SERPL-SCNC: 99 MMOL/L (ref 98–107)
CREAT SERPL-MCNC: 1.44 MG/DL (ref 0.67–1.17)
DEPRECATED HCO3 PLAS-SCNC: 20 MMOL/L (ref 22–29)
EGFRCR SERPLBLD CKD-EPI 2021: 48 ML/MIN/1.73M2
GLUCOSE SERPL-MCNC: 78 MG/DL (ref 70–99)
NT-PROBNP SERPL-MCNC: 3652 PG/ML (ref 0–1800)
POTASSIUM SERPL-SCNC: 4.1 MMOL/L (ref 3.4–5.3)
SIROLIMUS BLD-MCNC: <1 UG/L (ref 5–15)
SODIUM SERPL-SCNC: 131 MMOL/L (ref 135–145)
TME LAST DOSE: ABNORMAL H
TME LAST DOSE: ABNORMAL H
TSH SERPL DL<=0.005 MIU/L-ACNC: 12 UIU/ML (ref 0.3–4.2)

## 2023-10-17 PROCEDURE — P9604 ONE-WAY ALLOW PRORATED TRIP: HCPCS | Mod: ORL | Performed by: NURSE PRACTITIONER

## 2023-10-17 PROCEDURE — 36415 COLL VENOUS BLD VENIPUNCTURE: CPT | Mod: ORL | Performed by: NURSE PRACTITIONER

## 2023-10-17 PROCEDURE — 80048 BASIC METABOLIC PNL TOTAL CA: CPT | Mod: ORL | Performed by: NURSE PRACTITIONER

## 2023-10-17 PROCEDURE — 84443 ASSAY THYROID STIM HORMONE: CPT | Mod: ORL | Performed by: NURSE PRACTITIONER

## 2023-10-17 PROCEDURE — 80195 ASSAY OF SIROLIMUS: CPT | Mod: ORL | Performed by: NURSE PRACTITIONER

## 2023-10-17 PROCEDURE — 83880 ASSAY OF NATRIURETIC PEPTIDE: CPT | Mod: ORL | Performed by: NURSE PRACTITIONER

## 2023-10-18 ENCOUNTER — ASSISTED LIVING VISIT (OUTPATIENT)
Dept: GERIATRICS | Facility: CLINIC | Age: 83
End: 2023-10-18
Payer: MEDICARE

## 2023-10-18 VITALS
TEMPERATURE: 96.3 F | HEART RATE: 66 BPM | RESPIRATION RATE: 18 BRPM | SYSTOLIC BLOOD PRESSURE: 139 MMHG | OXYGEN SATURATION: 93 % | DIASTOLIC BLOOD PRESSURE: 69 MMHG | WEIGHT: 230.2 LBS | HEIGHT: 72 IN | BODY MASS INDEX: 31.18 KG/M2

## 2023-10-18 DIAGNOSIS — R60.0 BILATERAL LEG EDEMA: ICD-10-CM

## 2023-10-18 DIAGNOSIS — I10 HYPERTENSION, UNSPECIFIED TYPE: ICD-10-CM

## 2023-10-18 DIAGNOSIS — I50.9 CHRONIC CONGESTIVE HEART FAILURE, UNSPECIFIED HEART FAILURE TYPE (H): Primary | ICD-10-CM

## 2023-10-18 DIAGNOSIS — N18.30 STAGE 3 CHRONIC KIDNEY DISEASE, UNSPECIFIED WHETHER STAGE 3A OR 3B CKD (H): ICD-10-CM

## 2023-10-18 DIAGNOSIS — Z53.9 DIAGNOSIS NOT YET DEFINED: Primary | ICD-10-CM

## 2023-10-18 PROCEDURE — G0179 MD RECERTIFICATION HHA PT: HCPCS | Performed by: NURSE PRACTITIONER

## 2023-10-18 PROCEDURE — 99349 HOME/RES VST EST MOD MDM 40: CPT | Performed by: NURSE PRACTITIONER

## 2023-10-18 RX ORDER — BUMETANIDE 1 MG/1
1 TABLET ORAL DAILY
Start: 2023-10-18

## 2023-10-18 NOTE — PROGRESS NOTES
[facility] GERIATRICS    Chief Complaint   Patient presents with    RECHECK     HPI:  Luz Elena Sampson is a 83 year old  (1940), who is being seen today for an episodic care visit at: [facility] ([facility]) [[id number]]. Today's concern is:   1. Chronic congestive heart failure, unspecified heart failure type (H)    2. Stage 3 chronic kidney disease, unspecified whether stage 3a or 3b CKD (H)    3. Bilateral leg edema    4. Hypertension, unspecified type      Patient seen for follow up, chronic and acute issues, balancing CHF with CKD along with edema and pressures, labs on 10/17 in [facility], BNP elevated at 3652 but similar to result in June, appears healthy, SBP's in the 100-110 range, self transfers, using walker and scooter, safety aware, legs 2-3+ edema, creat 1.44.    Allergies, and PMH/PSH reviewed in [facility] today.  REVIEW OF SYSTEMS:  4 point ROS including Respiratory, CV, GI and , other than that noted in the HPI,  is negative    Objective:   /69   Pulse 66   Temp (!) 96.3  F (35.7  C)   Resp 18   Ht 1.829 m (6')   Wt 104.4 kg (230 lb 3.2 oz)   SpO2 93%   BMI 31.22 kg/m    GENERAL APPEARANCE:  in no distress, appears healthy  ENT:  Mouth and posterior oropharynx normal, moist mucous membranes  RESP:  no respiratory distress, diminished breath sounds bases  CV:  peripheral edema 2-3+ in lower legs to pedal, rate-normal  ABDOMEN:  bowel sounds normal  M/S:   Gait and station abnormal unsteady gait  SKIN:  Inspection of skin and subcutaneous tissue baseline  NEURO:   Examination of sensation by touch normal  PSYCH:  affect and mood normal    Labs done in SNF are in [facility]. Please refer to them using [facility]/Care Everywhere.    Assessment/Plan:  (I50.9) Chronic congestive heart failure, unspecified heart failure type (H)  (primary encounter diagnosis)  (N18.30) Stage 3 chronic kidney disease, unspecified whether stage 3a or 3b CKD (H)  (R60.0) Bilateral leg  edema  (I10) Hypertension, unspecified type  Comment: BNP 3652, K 41, creat 1.44, SBP's 100-110 range after lasix  Plan:   -discontinue lasix  -start bumex 1mg  -continue Na 1g tab daily (Na was 131)  -compression on 24/7 as tolerates, elevate legs  -home care following PT/OT/nursing  -follow up BNP/BMP in 1-2 months  -follow up cardiology PRN    MED REC REQUIRED  Post Medication Reconciliation Status: medication reconcilation previously completed during another office visit      Electronically signed by: JITENDRA Freitas CNP

## 2023-10-18 NOTE — LETTER
10/18/2023        RE: Luz Elena Sampson  Attn Parmjit Sampson  [address]  [place] [address]        No notes on file      Sincerely,        JITENDRA Freitas CNP

## 2023-11-04 PROBLEM — R55 SYNCOPE: Status: ACTIVE | Noted: 2023-01-29

## 2023-11-28 VITALS
OXYGEN SATURATION: 96 % | SYSTOLIC BLOOD PRESSURE: 113 MMHG | RESPIRATION RATE: 17 BRPM | DIASTOLIC BLOOD PRESSURE: 67 MMHG | HEART RATE: 60 BPM | WEIGHT: 249.8 LBS | TEMPERATURE: 98.4 F | BODY MASS INDEX: 33.88 KG/M2

## 2023-11-29 ENCOUNTER — ASSISTED LIVING VISIT (OUTPATIENT)
Dept: GERIATRICS | Facility: CLINIC | Age: 83
End: 2023-11-29
Payer: MEDICARE

## 2023-11-29 DIAGNOSIS — N18.30 STAGE 3 CHRONIC KIDNEY DISEASE, UNSPECIFIED WHETHER STAGE 3A OR 3B CKD (H): ICD-10-CM

## 2023-11-29 DIAGNOSIS — L89.152 PRESSURE ULCER OF COCCYGEAL REGION, STAGE 2 (H): Primary | ICD-10-CM

## 2023-11-29 DIAGNOSIS — I50.9 CHRONIC CONGESTIVE HEART FAILURE, UNSPECIFIED HEART FAILURE TYPE (H): ICD-10-CM

## 2023-11-29 PROCEDURE — 99349 HOME/RES VST EST MOD MDM 40: CPT | Performed by: NURSE PRACTITIONER

## 2023-11-29 NOTE — LETTER
11/29/2023        RE: Luz Elena Sampson  Attn Parmjit Sampson  [address]  [place] [address]        No notes on file      Sincerely,        JITENDRA Freitas CNP

## 2023-11-29 NOTE — PROGRESS NOTES
[facility] GERIATRICS    Chief Complaint   Patient presents with    RECHECK     HPI:  Luz Elena Sampson is a 83 year old  (1940), who is being seen today for an episodic care visit at: [facility] ([facility]) [[id number]]. Today's concern is:   1. Pressure ulcer of coccygeal region, stage 2 (H)    2. Chronic congestive heart failure, unspecified heart failure type (H)    3. Stage 3 chronic kidney disease, unspecified whether stage 3a or 3b CKD (H)      Patient seen for follow up, also spoke with home care nurse Krystal regarding status, PU on coccyx now intact skin with mild scar tissue, still high risk for repeat, mild SOB, recent bout of diarrhea from 'stomach bug' that caused mild dehydration with limited intake x2 days, leg edema 2+ and also with L pedal edema, wearing compression and using lotion, intact skin, mild hypervolemia, also recent creat 1.44, GFR 48, changed lasix to bumex recently, due to labs again.    Allergies, and PMH/PSH reviewed in EPIC today.  REVIEW OF SYSTEMS:  4 point ROS including Respiratory, CV, GI and , other than that noted in the HPI,  is negative    Objective:   /67   Pulse 60   Temp 98.4  F (36.9  C)   Resp 17   Wt 113.3 kg (249 lb 12.8 oz)   SpO2 96%   BMI 33.88 kg/m    GENERAL APPEARANCE:  in no distress, appears healthy  ENT:  Mouth and posterior oropharynx normal, moist mucous membranes  RESP:  lungs clear to auscultation , no respiratory distress  CV:  peripheral edema 2+ in lower legs, L pedal, irregular rhythm rate  ABDOMEN:  bowel sounds normal  M/S:   Gait and station abnormal using walker  SKIN:  Inspection of skin and subcutaneous tissue baseline  NEURO:   Examination of sensation by touch normal  PSYCH:  affect and mood normal    Labs done in SNF are in [facility]. Please refer to them using [employer]/Care Everywhere.    Assessment/Plan:  (L89.152) Pressure ulcer of coccygeal region, stage 2 (H)  (primary encounter  diagnosis)  Comment: stage 2 with now intact skin, mild scar tissue  Plan: home care nursing to discharge from service  -educated on pressure reduction    (I50.9) Chronic congestive heart failure, unspecified heart failure type (H)  Comment: recent BNP 3652, leg edema 2+, no SOB  Plan: continue newer bumex 1mg  -maintain compression and elevation  -repeat BNP on 12/12  -attempting to balance fluid volume, renal status and hypotension    (N18.30) Stage 3 chronic kidney disease, unspecified whether stage 3a or 3b CKD (H)  Comment: creat 1.44, GFR 48  Plan: dose medications renally as possible  -repeat BMP on 12/12    MED REC REQUIRED  Post Medication Reconciliation Status: medication reconcilation previously completed during another office visit      Electronically signed by: JITENDRA Freitas CNP

## 2023-12-02 ENCOUNTER — HEALTH MAINTENANCE LETTER (OUTPATIENT)
Age: 83
End: 2023-12-02

## 2023-12-10 ENCOUNTER — LAB REQUISITION (OUTPATIENT)
Dept: LAB | Facility: CLINIC | Age: 83
End: 2023-12-10
Payer: MEDICARE

## 2023-12-10 DIAGNOSIS — I50.9 HEART FAILURE, UNSPECIFIED (H): ICD-10-CM

## 2023-12-10 DIAGNOSIS — N18.9 CHRONIC KIDNEY DISEASE, UNSPECIFIED: ICD-10-CM

## 2023-12-10 DIAGNOSIS — E03.9 HYPOTHYROIDISM, UNSPECIFIED: ICD-10-CM

## 2023-12-12 PROCEDURE — P9603 ONE-WAY ALLOW PRORATED MILES: HCPCS | Mod: ORL | Performed by: NURSE PRACTITIONER

## 2023-12-12 PROCEDURE — 80048 BASIC METABOLIC PNL TOTAL CA: CPT | Mod: ORL | Performed by: NURSE PRACTITIONER

## 2023-12-12 PROCEDURE — 83880 ASSAY OF NATRIURETIC PEPTIDE: CPT | Mod: ORL | Performed by: NURSE PRACTITIONER

## 2023-12-12 PROCEDURE — 36415 COLL VENOUS BLD VENIPUNCTURE: CPT | Mod: ORL | Performed by: NURSE PRACTITIONER

## 2023-12-12 PROCEDURE — 84443 ASSAY THYROID STIM HORMONE: CPT | Mod: ORL | Performed by: NURSE PRACTITIONER

## 2023-12-13 ENCOUNTER — ASSISTED LIVING VISIT (OUTPATIENT)
Dept: GERIATRICS | Facility: CLINIC | Age: 83
End: 2023-12-13
Payer: MEDICARE

## 2023-12-13 VITALS
OXYGEN SATURATION: 96 % | RESPIRATION RATE: 17 BRPM | WEIGHT: 249.8 LBS | SYSTOLIC BLOOD PRESSURE: 113 MMHG | DIASTOLIC BLOOD PRESSURE: 67 MMHG | TEMPERATURE: 98.4 F | BODY MASS INDEX: 33.88 KG/M2 | HEART RATE: 60 BPM

## 2023-12-13 DIAGNOSIS — I50.9 CHRONIC CONGESTIVE HEART FAILURE, UNSPECIFIED HEART FAILURE TYPE (H): Primary | ICD-10-CM

## 2023-12-13 DIAGNOSIS — E03.9 HYPOTHYROIDISM, UNSPECIFIED TYPE: ICD-10-CM

## 2023-12-13 DIAGNOSIS — N18.30 STAGE 3 CHRONIC KIDNEY DISEASE, UNSPECIFIED WHETHER STAGE 3A OR 3B CKD (H): ICD-10-CM

## 2023-12-13 LAB
ANION GAP SERPL CALCULATED.3IONS-SCNC: 12 MMOL/L (ref 7–15)
BUN SERPL-MCNC: 23.6 MG/DL (ref 8–23)
CALCIUM SERPL-MCNC: 9.2 MG/DL (ref 8.8–10.2)
CHLORIDE SERPL-SCNC: 101 MMOL/L (ref 98–107)
CREAT SERPL-MCNC: 1.6 MG/DL (ref 0.67–1.17)
DEPRECATED HCO3 PLAS-SCNC: 24 MMOL/L (ref 22–29)
EGFRCR SERPLBLD CKD-EPI 2021: 42 ML/MIN/1.73M2
GLUCOSE SERPL-MCNC: 124 MG/DL (ref 70–99)
NT-PROBNP SERPL-MCNC: 3103 PG/ML (ref 0–1800)
POTASSIUM SERPL-SCNC: 4.2 MMOL/L (ref 3.4–5.3)
SODIUM SERPL-SCNC: 137 MMOL/L (ref 135–145)
TSH SERPL DL<=0.005 MIU/L-ACNC: 13.9 UIU/ML (ref 0.3–4.2)

## 2023-12-13 PROCEDURE — 99349 HOME/RES VST EST MOD MDM 40: CPT | Performed by: NURSE PRACTITIONER

## 2023-12-13 RX ORDER — LEVOTHYROXINE SODIUM 125 UG/1
125 TABLET ORAL DAILY
Start: 2023-12-13

## 2023-12-13 NOTE — PROGRESS NOTES
[facility] GERIATRICS    Chief Complaint   Patient presents with    RECHECK     HPI:  Luz Elena Sampson is a 83 year old  (1940), who is being seen today for an episodic care visit at: [facility] ([facility]) [[id number]]. Today's concern is:   1. Chronic congestive heart failure, unspecified heart failure type (H)    2. Stage 3 chronic kidney disease, unspecified whether stage 3a or 3b CKD (H)    3. Hypothyroidism, unspecified type      Patient seen for follow up, attempting to balance CHF, edema, HTN with renal status proving difficult, appears healthy, edema 2+, no SOB, mild hypervolemia, on bumex, labs 12/12 with BNP 3103 (slightly down), creat 1.6, GFR 42 (slightly worse), appears healthier with additional bumex, also TSH elevated 13.9, overall no distress.    Allergies, and PMH/PSH reviewed in EPIC today.  REVIEW OF SYSTEMS:  4 point ROS including Respiratory, CV, GI and , other than that noted in the HPI,  is negative    Objective:   /67   Pulse 60   Temp 98.4  F (36.9  C)   Resp 17   Wt 113.3 kg (249 lb 12.8 oz)   SpO2 96%   BMI 33.88 kg/m    GENERAL APPEARANCE:  in no distress, appears healthy  ENT:  Mouth and posterior oropharynx normal, moist mucous membranes  RESP:  lungs clear to auscultation , no respiratory distress  CV:  regular rate and rhythm, no murmur, rub, or gallop, peripheral edema 2+ in lower legs  ABDOMEN:  bowel sounds normal  M/S:   Gait and station abnormal using walker  SKIN:  Inspection of skin and subcutaneous tissue baseline  NEURO:   Examination of sensation by touch normal  PSYCH:  affect and mood normal    Labs done in SNF are in [facility]. Please refer to them using [employer]/Care Everywhere.    Assessment/Plan:  (I50.9) Chronic congestive heart failure, unspecified heart failure type (H)  (primary encounter diagnosis)  Comment: edema+, no SOB, BNP 3103  Plan: continue bumex 1mg, Na 1g TID  -discontinue folic acid, MVI  -to wear  compression 24/7 as possible    (N18.30) Stage 3 chronic kidney disease, unspecified whether stage 3a or 3b CKD (H)  Comment: creat 1.6, GFR 42  Plan: attempt to dose medications renally as possible  -repeat BMP in 1-2 months    (E03.9) Hypothyroidism, unspecified type  Comment: TSH 13.9  Plan: increase levothyroxine from 100 to 125mcg  -repeat TSH in 2-3 months      MED REC REQUIRED  Post Medication Reconciliation Status: medication reconcilation previously completed during another office visit        Electronically signed by: JITENDRA Freitas CNP

## 2023-12-13 NOTE — LETTER
12/13/2023        RE: Luz Elena Sampson  Attn Parmjit Sampson  [address]  [place] [address]        No notes on file      Sincerely,        JITENDRA Freitas CNP

## 2023-12-20 ENCOUNTER — ASSISTED LIVING VISIT (OUTPATIENT)
Dept: GERIATRICS | Facility: CLINIC | Age: 83
End: 2023-12-20
Payer: MEDICARE

## 2023-12-20 VITALS
OXYGEN SATURATION: 96 % | TEMPERATURE: 98.4 F | WEIGHT: 249.8 LBS | DIASTOLIC BLOOD PRESSURE: 67 MMHG | RESPIRATION RATE: 17 BRPM | BODY MASS INDEX: 33.88 KG/M2 | HEART RATE: 60 BPM | SYSTOLIC BLOOD PRESSURE: 113 MMHG

## 2023-12-20 DIAGNOSIS — E03.9 HYPOTHYROIDISM, UNSPECIFIED TYPE: ICD-10-CM

## 2023-12-20 DIAGNOSIS — I50.9 CHRONIC CONGESTIVE HEART FAILURE, UNSPECIFIED HEART FAILURE TYPE (H): Primary | ICD-10-CM

## 2023-12-20 DIAGNOSIS — N18.30 STAGE 3 CHRONIC KIDNEY DISEASE, UNSPECIFIED WHETHER STAGE 3A OR 3B CKD (H): ICD-10-CM

## 2023-12-20 PROCEDURE — 99349 HOME/RES VST EST MOD MDM 40: CPT | Performed by: NURSE PRACTITIONER

## 2023-12-20 NOTE — LETTER
12/20/2023        RE: Luz Elena Samspon  Attn Parmjit Sampson  [address]  [place] [address]        No notes on file      Sincerely,        JITENDRA Freitas CNP

## 2023-12-20 NOTE — PROGRESS NOTES
[facility] GERIATRICS    Chief Complaint   Patient presents with    RECHECK     HPI:  Luz Elena Sampson is a 83 year old  (1940), who is being seen today for an episodic care visit at: [facility] (S) [[id number]]. Today's concern is:   1. Chronic congestive heart failure, unspecified heart failure type (H)    2. Stage 3 chronic kidney disease, unspecified whether stage 3a or 3b CKD (H)    3. Hypothyroidism, unspecified type      Patient seen for follow up, no overt s/s CHF exacerbation, attempting to balance BP's, CKD, hypervolemia, leg edema with adequate results, leg and pedal edema 2-3+, SBP's in the 100-110 range, compression on, recent BNP 31.03 slightly improved, recent creat 1.6 and slightly worse, recently changed lasix to bumex with slight increase, tolerating well, also TSH was 13.9, mild lethargy still, overall appears healthy.    Allergies, and PMH/PSH reviewed in EPIC today.  REVIEW OF SYSTEMS:  4 point ROS including Respiratory, CV, GI and , other than that noted in the HPI,  is negative    Objective:   /67   Pulse 60   Temp 98.4  F (36.9  C)   Resp 17   Wt 113.3 kg (249 lb 12.8 oz)   SpO2 96%   BMI 33.88 kg/m    GENERAL APPEARANCE:  in no distress, appears healthy  ENT:  Mouth and posterior oropharynx normal, moist mucous membranes  RESP:  lungs clear to auscultation , no respiratory distress  CV:  peripheral edema 2-3+ in lower legs and pedal, rate-normal  ABDOMEN:  bowel sounds normal  M/S:   Gait and station abnormal using walker  SKIN:  Inspection of skin and subcutaneous tissue baseline  NEURO:   Examination of sensation by touch normal  PSYCH:  affect and mood normal    Labs done in SNF are in [facility]. Please refer to them using [employer]/Care Everywhere.    Assessment/Plan:  (I50.9) Chronic congestive heart failure, unspecified heart failure type (H)  (primary encounter diagnosis)  (N18.30) Stage 3 chronic kidney disease, unspecified whether  stage 3a or 3b CKD (H)  Comment: BNP 3103, creat 1.6  Plan: maintain leg elevation and compression  -continue bumex 1mg  -CBC, BMP, BNP on 1/16/24    (E03.9) Hypothyroidism, unspecified type  Comment: recent TSH 13.9  Plan: continue levothyroxine increase to 125mcg  -repeat TSH on 1/16    MED REC REQUIRED  Post Medication Reconciliation Status: medication reconcilation previously completed during another office visit        Electronically signed by: JITENDRA Freitas CNP

## 2024-01-15 ENCOUNTER — LAB REQUISITION (OUTPATIENT)
Dept: LAB | Facility: CLINIC | Age: 84
End: 2024-01-15
Payer: MEDICARE

## 2024-01-15 DIAGNOSIS — N18.9 CHRONIC KIDNEY DISEASE, UNSPECIFIED: ICD-10-CM

## 2024-01-15 DIAGNOSIS — D64.9 ANEMIA, UNSPECIFIED: ICD-10-CM

## 2024-01-16 VITALS
SYSTOLIC BLOOD PRESSURE: 113 MMHG | RESPIRATION RATE: 17 BRPM | BODY MASS INDEX: 33.88 KG/M2 | DIASTOLIC BLOOD PRESSURE: 67 MMHG | WEIGHT: 249.8 LBS | TEMPERATURE: 98.4 F | HEART RATE: 60 BPM | OXYGEN SATURATION: 96 %

## 2024-01-16 LAB
ERYTHROCYTE [DISTWIDTH] IN BLOOD BY AUTOMATED COUNT: 14.2 % (ref 10–15)
HCT VFR BLD AUTO: 41.9 % (ref 40–53)
HGB BLD-MCNC: 13.8 G/DL (ref 13.3–17.7)
MCH RBC QN AUTO: 28.5 PG (ref 26.5–33)
MCHC RBC AUTO-ENTMCNC: 32.9 G/DL (ref 31.5–36.5)
MCV RBC AUTO: 86 FL (ref 78–100)
PLATELET # BLD AUTO: 198 10E3/UL (ref 150–450)
RBC # BLD AUTO: 4.85 10E6/UL (ref 4.4–5.9)
WBC # BLD AUTO: 8.5 10E3/UL (ref 4–11)

## 2024-01-16 PROCEDURE — 36415 COLL VENOUS BLD VENIPUNCTURE: CPT | Mod: ORL | Performed by: NURSE PRACTITIONER

## 2024-01-16 PROCEDURE — P9603 ONE-WAY ALLOW PRORATED MILES: HCPCS | Mod: ORL | Performed by: NURSE PRACTITIONER

## 2024-01-16 PROCEDURE — 83880 ASSAY OF NATRIURETIC PEPTIDE: CPT | Mod: ORL | Performed by: NURSE PRACTITIONER

## 2024-01-16 PROCEDURE — 85027 COMPLETE CBC AUTOMATED: CPT | Mod: ORL | Performed by: NURSE PRACTITIONER

## 2024-01-16 PROCEDURE — 80048 BASIC METABOLIC PNL TOTAL CA: CPT | Mod: ORL | Performed by: NURSE PRACTITIONER

## 2024-01-16 PROCEDURE — 84443 ASSAY THYROID STIM HORMONE: CPT | Mod: ORL | Performed by: NURSE PRACTITIONER

## 2024-01-17 ENCOUNTER — ASSISTED LIVING VISIT (OUTPATIENT)
Dept: GERIATRICS | Facility: CLINIC | Age: 84
End: 2024-01-17
Payer: MEDICARE

## 2024-01-17 DIAGNOSIS — I50.9 CHRONIC CONGESTIVE HEART FAILURE, UNSPECIFIED HEART FAILURE TYPE (H): ICD-10-CM

## 2024-01-17 DIAGNOSIS — N18.30 STAGE 3 CHRONIC KIDNEY DISEASE, UNSPECIFIED WHETHER STAGE 3A OR 3B CKD (H): ICD-10-CM

## 2024-01-17 DIAGNOSIS — I48.19 ATRIAL FIBRILLATION, PERSISTENT (H): ICD-10-CM

## 2024-01-17 PROBLEM — L89.152 PRESSURE ULCER OF COCCYGEAL REGION, STAGE 2 (H): Status: RESOLVED | Noted: 2023-09-13 | Resolved: 2024-01-17

## 2024-01-17 LAB
ANION GAP SERPL CALCULATED.3IONS-SCNC: 14 MMOL/L (ref 7–15)
BUN SERPL-MCNC: 21.2 MG/DL (ref 8–23)
CALCIUM SERPL-MCNC: 9.3 MG/DL (ref 8.8–10.2)
CHLORIDE SERPL-SCNC: 101 MMOL/L (ref 98–107)
CREAT SERPL-MCNC: 1.47 MG/DL (ref 0.67–1.17)
DEPRECATED HCO3 PLAS-SCNC: 22 MMOL/L (ref 22–29)
EGFRCR SERPLBLD CKD-EPI 2021: 47 ML/MIN/1.73M2
GLUCOSE SERPL-MCNC: 117 MG/DL (ref 70–99)
NT-PROBNP SERPL-MCNC: 3180 PG/ML (ref 0–1800)
POTASSIUM SERPL-SCNC: 4.2 MMOL/L (ref 3.4–5.3)
SODIUM SERPL-SCNC: 137 MMOL/L (ref 135–145)
TSH SERPL DL<=0.005 MIU/L-ACNC: 7.95 UIU/ML (ref 0.3–4.2)

## 2024-01-17 PROCEDURE — 99349 HOME/RES VST EST MOD MDM 40: CPT | Performed by: NURSE PRACTITIONER

## 2024-01-17 NOTE — LETTER
1/17/2024        RE: Luz Elena Sampson  Attn Parmjit Sampson  [address]  [place] [address]        No notes on file      Sincerely,        JITENDRA Freitas CNP

## 2024-01-17 NOTE — PROGRESS NOTES
[facility] GERIATRICS    Chief Complaint   Patient presents with    RECHECK     HPI:  Luz Elena Sampson is a 83 year old  (1940), who is being seen today for an episodic care visit at: [facility] (S) [[id number]]. Today's concern is:   1. Chronic congestive heart failure, unspecified heart failure type (H)    2. Atrial fibrillation, persistent (H)    3. Stage 3 chronic kidney disease, unspecified whether stage 3a or 3b CKD (H)      Patient seen for follow up, oriented, appears healthy, ambulatory, labs on 1/16 generally WNL except BNP 3180, creat 1.47, RRR, denies CP, edema 2+ with compression, no SOB, fairly independent with ADL's.    Allergies, and PMH/PSH reviewed in EPIC today.  REVIEW OF SYSTEMS:  4 point ROS including Respiratory, CV, GI and , other than that noted in the HPI,  is negative    Objective:   /67   Pulse 60   Temp 98.4  F (36.9  C)   Resp 17   Wt 113.3 kg (249 lb 12.8 oz)   SpO2 96%   BMI 33.88 kg/m    GENERAL APPEARANCE:  in no distress, appears healthy  ENT:  Mouth and posterior oropharynx normal, moist mucous membranes  RESP:  lungs clear to auscultation , no respiratory distress  CV:  regular rate and rhythm, no murmur, rub, or gallop, peripheral edema 2+ in lower legs  ABDOMEN:  bowel sounds normal  M/S:   Gait and station abnormal using walker  SKIN:  Inspection of skin and subcutaneous tissue baseline  NEURO:   Examination of sensation by touch normal  PSYCH:  affect and mood normal    Labs done in SNF are in [facility]. Please refer to them using [employer]/Care Everywhere.    Assessment/Plan:  (I50.9) Chronic congestive heart failure, unspecified heart failure type (H)  Comment: BNP 3180, no overt s/s CHF exacerbation  Plan: continue bumex daily  -leg compression on 24/7 as will tolerate  -discontinue: Fe, B12, Na tabs; no longer indicated    (I48.19) Atrial fibrillation, persistent (H)  Comment: RRR, denies CP  Plan: discontinue  metoprolol, soft BP's and HR's  -staff to check VS as scheduled    (N18.30) Stage 3 chronic kidney disease, unspecified whether stage 3a or 3b CKD (H)  Comment: creat 1.47, GFR 47  Plan: dose medications renally as possible  -repeat BMP in 3-6 months    MED REC REQUIRED  Post Medication Reconciliation Status: medication reconcilation previously completed during another office visit        Electronically signed by: JITENDRA Freitas CNP

## 2024-02-06 VITALS — DIASTOLIC BLOOD PRESSURE: 90 MMHG | WEIGHT: 249.8 LBS | BODY MASS INDEX: 33.88 KG/M2 | SYSTOLIC BLOOD PRESSURE: 180 MMHG

## 2024-02-07 ENCOUNTER — ASSISTED LIVING VISIT (OUTPATIENT)
Dept: GERIATRICS | Facility: CLINIC | Age: 84
End: 2024-02-07
Payer: MEDICARE

## 2024-02-07 DIAGNOSIS — I50.9 CHRONIC CONGESTIVE HEART FAILURE, UNSPECIFIED HEART FAILURE TYPE (H): Primary | ICD-10-CM

## 2024-02-07 DIAGNOSIS — N18.30 STAGE 3 CHRONIC KIDNEY DISEASE, UNSPECIFIED WHETHER STAGE 3A OR 3B CKD (H): ICD-10-CM

## 2024-02-07 DIAGNOSIS — I73.9 PERIPHERAL VASCULAR DISEASE (H): ICD-10-CM

## 2024-02-07 PROCEDURE — 99349 HOME/RES VST EST MOD MDM 40: CPT | Performed by: NURSE PRACTITIONER

## 2024-02-07 NOTE — LETTER
2/7/2024        RE: Luz Elena Sampson  Attn Parmjit Sampson  [address]  [place] [address]        No notes on file      Sincerely,        JITENDRA Freitas CNP

## 2024-02-07 NOTE — PROGRESS NOTES
[facility] GERIATRICS    Chief Complaint   Patient presents with    RECHECK     HPI:  Luz Elena Sampson is a 83 year old  (1940), who is being seen today for an episodic care visit at: [facility] (S) [[id number]]. Today's concern is:   1. Chronic congestive heart failure, unspecified heart failure type (H)    2. Peripheral vascular disease (H24)    3. Stage 3 chronic kidney disease, unspecified whether stage 3a or 3b CKD (H)      Patient seen for follow up, have been attempting to balance CHF, edema, SOB and renal status, recent BNP 3180, creat 1/47, GFR 47, lungs clear, leg edema 2+ with currently no open ulcers on legs, poor circulation with thierry lower legs, denies CP, ambulatory with walker, no distress, overall appears healthy for multiple comorbidities.    Allergies, and PMH/PSH reviewed in EPIC today.  REVIEW OF SYSTEMS:  4 point ROS including Respiratory, CV, GI and , other than that noted in the HPI,  is negative    Objective:   BP (!) 180/90   Wt 113.3 kg (249 lb 12.8 oz)   BMI 33.88 kg/m    GENERAL APPEARANCE:  in no distress, appears healthy  ENT:  Mouth and posterior oropharynx normal, moist mucous membranes  RESP:  lungs clear to auscultation , no respiratory distress  CV:  regular rate and rhythm, no murmur, rub, or gallop, peripheral edema 2+ in lower legs  ABDOMEN:  bowel sounds normal  M/S:   Gait and station abnormal unsteady gait  SKIN:  Inspection of skin and subcutaneous tissue baseline  NEURO:   Examination of sensation by touch normal  PSYCH:  affect and mood normal    Labs done in SNF are in [facility]. Please refer to them using [employer]/Care Everywhere.    Assessment/Plan:  (I50.9) Chronic congestive heart failure, unspecified heart failure type (H)  (primary encounter diagnosis)  (I73.9) Peripheral vascular disease (H24)  (N18.30) Stage 3 chronic kidney disease, unspecified whether stage 3a or 3b CKD (H)  Comment: BNP 3180, GFR 47, legs thierry with  edema 2+ no open areas  Plan:   -repeat BMP, BNP in 2-3 months  -continue bumex 1mg  -dose medications renally as possible  -continue leg compression  -encouraged elevation but declines  -instructed to inform nursing with any open areas on lower extremities as will need intervention ASAP  -plan to follow up RE: status and meds in 1-2 months    MED REC REQUIRED  Post Medication Reconciliation Status: medication reconcilation previously completed during another office visit        Electronically signed by: JITENDRA Freitas CNP

## 2024-02-10 ENCOUNTER — HEALTH MAINTENANCE LETTER (OUTPATIENT)
Age: 84
End: 2024-02-10

## 2024-02-12 ENCOUNTER — DOCUMENTATION ONLY (OUTPATIENT)
Dept: GERIATRICS | Facility: CLINIC | Age: 84
End: 2024-02-12
Payer: MEDICARE

## 2024-03-20 ENCOUNTER — ASSISTED LIVING VISIT (OUTPATIENT)
Dept: GERIATRICS | Facility: CLINIC | Age: 84
End: 2024-03-20
Payer: MEDICARE

## 2024-03-20 VITALS
TEMPERATURE: 97.4 F | WEIGHT: 240 LBS | OXYGEN SATURATION: 95 % | RESPIRATION RATE: 14 BRPM | BODY MASS INDEX: 32.55 KG/M2 | SYSTOLIC BLOOD PRESSURE: 101 MMHG | HEART RATE: 66 BPM | DIASTOLIC BLOOD PRESSURE: 53 MMHG

## 2024-03-20 DIAGNOSIS — I50.9 CHRONIC CONGESTIVE HEART FAILURE, UNSPECIFIED HEART FAILURE TYPE (H): Primary | ICD-10-CM

## 2024-03-20 DIAGNOSIS — I73.9 PERIPHERAL VASCULAR DISEASE (H): ICD-10-CM

## 2024-03-20 DIAGNOSIS — N18.30 STAGE 3 CHRONIC KIDNEY DISEASE, UNSPECIFIED WHETHER STAGE 3A OR 3B CKD (H): ICD-10-CM

## 2024-03-20 PROCEDURE — 99349 HOME/RES VST EST MOD MDM 40: CPT | Performed by: NURSE PRACTITIONER

## 2024-03-20 NOTE — LETTER
3/20/2024        RE: Luz Elena Sampson  Attn Parmjit Sampson  [address]  [place] [address]        No notes on file      Sincerely,        JITENDRA Freitas CNP

## 2024-03-20 NOTE — PROGRESS NOTES
[facility] GERIATRICS    Chief Complaint   Patient presents with    RECHECK     HPI:  Luz Elena Sampson is a 83 year old  (1940), who is being seen today for an episodic care visit at: [facility] (S) [[id number]]. Today's concern is:   1. Chronic congestive heart failure, unspecified heart failure type (H)    2. Peripheral vascular disease (H24)    3. Stage 3 chronic kidney disease, unspecified whether stage 3a or 3b CKD (H)      Patient seen for follow up, balancing CHF with poor peripheral circulation/edema with BP's/ambulation and CKD, recent labs BNP 3180, creat 1.47, K 4.2, SBP's in the 110 range, difficult to find balance between safety and labs and vitals and fluid balance, mild hypervolemia, ambulatory limited to apartment, fairly independent, no distress.     Allergies, and PMH/PSH reviewed in EPIC today.  REVIEW OF SYSTEMS:  4 point ROS including Respiratory, CV, GI and , other than that noted in the HPI,  is negative    Objective:   /53   Pulse 66   Temp 97.4  F (36.3  C)   Resp 14   Wt 108.9 kg (240 lb)   SpO2 95%   BMI 32.55 kg/m    GENERAL APPEARANCE:  in no distress, appears healthy  ENT:  Mouth and posterior oropharynx normal, moist mucous membranes  RESP:  no respiratory distress  CV:  peripheral edema 1-2+ in lower legs  ABDOMEN:  bowel sounds normal  M/S:   Gait and station abnormal unsteady  SKIN:  Inspection of skin and subcutaneous tissue baseline  NEURO:   Examination of sensation by touch normal  PSYCH:  affect and mood normal    Labs done in SNF are in [facility]. Please refer to them using [employer]/Care Everywhere.    Assessment/Plan:  (I50.9) Chronic congestive heart failure, unspecified heart failure type (H)  (primary encounter diagnosis)  (I73.9) Peripheral vascular disease (H24)  (N18.30) Stage 3 chronic kidney disease, unspecified whether stage 3a or 3b CKD (H)  Comment: no overt s/s CHF exacerbation, BNP 3180, edema 1-2+, creat  1.47  Plan:   -BMP, BNP on 4/9  -continue bumex 1mg daily  -staff to check VS as scheduled  -maintain leg compression on 24/7 as possible  -elevate legs   -dose medication renally as possible    MED REC REQUIRED  Post Medication Reconciliation Status: medication reconcilation previously completed during another office visit        Electronically signed by: JITENDRA Freitas CNP

## 2024-04-01 DIAGNOSIS — N40.0 BPH (BENIGN PROSTATIC HYPERPLASIA): Primary | ICD-10-CM

## 2024-04-01 RX ORDER — TAMSULOSIN HYDROCHLORIDE 0.4 MG/1
0.4 CAPSULE ORAL EVERY EVENING
Qty: 90 CAPSULE | Refills: 3 | Status: SHIPPED | OUTPATIENT
Start: 2024-04-01

## 2024-04-07 ENCOUNTER — LAB REQUISITION (OUTPATIENT)
Dept: LAB | Facility: CLINIC | Age: 84
End: 2024-04-07
Payer: MEDICARE

## 2024-04-07 DIAGNOSIS — I50.9 HEART FAILURE, UNSPECIFIED (H): ICD-10-CM

## 2024-04-07 DIAGNOSIS — N18.9 CHRONIC KIDNEY DISEASE, UNSPECIFIED: ICD-10-CM

## 2024-04-08 ENCOUNTER — TELEPHONE (OUTPATIENT)
Dept: GERIATRICS | Facility: CLINIC | Age: 84
End: 2024-04-08
Payer: MEDICARE

## 2024-04-08 RX ORDER — ECONAZOLE NITRATE 10 MG/G
CREAM TOPICAL DAILY
COMMUNITY

## 2024-04-08 NOTE — TELEPHONE ENCOUNTER
[facility]  [facility]     Provider: JITENDRA Dean CNP   Facility: [facility] Facility Type:  AL    Allergies   Allergen Reactions    Warfarin Itching     Alopecia    Nsaids Nephrotoxicity     Patient avoids due to kidney function            Verbal Order/Direction given by Provider: Discontinue Ketoconazole when supply is gone and then start Econazole 1% topical cream- 2gm to left foot BID.     Provider giving Order:  JITENDRA Dean CNP     Verbal Order given to: Left VM for Melia Ragland RN

## 2024-04-09 VITALS
DIASTOLIC BLOOD PRESSURE: 53 MMHG | OXYGEN SATURATION: 95 % | SYSTOLIC BLOOD PRESSURE: 101 MMHG | BODY MASS INDEX: 32.55 KG/M2 | RESPIRATION RATE: 14 BRPM | TEMPERATURE: 97.4 F | HEART RATE: 66 BPM | WEIGHT: 240 LBS

## 2024-04-09 PROCEDURE — 80048 BASIC METABOLIC PNL TOTAL CA: CPT | Mod: ORL

## 2024-04-09 PROCEDURE — 83880 ASSAY OF NATRIURETIC PEPTIDE: CPT | Mod: ORL

## 2024-04-09 PROCEDURE — P9603 ONE-WAY ALLOW PRORATED MILES: HCPCS | Mod: ORL

## 2024-04-09 PROCEDURE — 36415 COLL VENOUS BLD VENIPUNCTURE: CPT | Mod: ORL

## 2024-04-10 ENCOUNTER — ASSISTED LIVING VISIT (OUTPATIENT)
Dept: GERIATRICS | Facility: CLINIC | Age: 84
End: 2024-04-10
Payer: MEDICARE

## 2024-04-10 DIAGNOSIS — N18.30 STAGE 3 CHRONIC KIDNEY DISEASE, UNSPECIFIED WHETHER STAGE 3A OR 3B CKD (H): ICD-10-CM

## 2024-04-10 DIAGNOSIS — I50.9 CHRONIC CONGESTIVE HEART FAILURE, UNSPECIFIED HEART FAILURE TYPE (H): Primary | ICD-10-CM

## 2024-04-10 DIAGNOSIS — I10 HYPERTENSION, UNSPECIFIED TYPE: ICD-10-CM

## 2024-04-10 LAB
ANION GAP SERPL CALCULATED.3IONS-SCNC: 13 MMOL/L (ref 7–15)
BUN SERPL-MCNC: 24.1 MG/DL (ref 8–23)
CALCIUM SERPL-MCNC: 8.8 MG/DL (ref 8.8–10.2)
CHLORIDE SERPL-SCNC: 100 MMOL/L (ref 98–107)
CREAT SERPL-MCNC: 1.45 MG/DL (ref 0.67–1.17)
DEPRECATED HCO3 PLAS-SCNC: 20 MMOL/L (ref 22–29)
EGFRCR SERPLBLD CKD-EPI 2021: 48 ML/MIN/1.73M2
GLUCOSE SERPL-MCNC: 88 MG/DL (ref 70–99)
NT-PROBNP SERPL-MCNC: 3707 PG/ML (ref 0–1800)
POTASSIUM SERPL-SCNC: 4.2 MMOL/L (ref 3.4–5.3)
SODIUM SERPL-SCNC: 133 MMOL/L (ref 135–145)

## 2024-04-10 PROCEDURE — 99349 HOME/RES VST EST MOD MDM 40: CPT | Performed by: NURSE PRACTITIONER

## 2024-04-10 NOTE — PROGRESS NOTES
[facility] GERIATRICS    Chief Complaint   Patient presents with    RECHECK     HPI:  Luz Elena Sampson is a 83 year old  (1940), who is being seen today for an episodic care visit at: [facility] ([facility]) [[id number]]. Today's concern is:   1. Chronic congestive heart failure, unspecified heart failure type (H)    2. Stage 3 chronic kidney disease, unspecified whether stage 3a or 3b CKD (H)    3. Hypertension, unspecified type      Patient seen for follow up, labs returned with BNP 3707, creat 1.45, GFR 48, K 4.2, SBP's in the 100-110 range, attempting to balance fluid with kidneys, heart, diuretics, leg edema, low BP's, appears healthy, lungs clear, no distress, overall appears baseline.    Allergies, and PMH/PSH reviewed in EPIC today.  REVIEW OF SYSTEMS:  4 point ROS including Respiratory, CV, GI and , other than that noted in the HPI,  is negative    Objective:   /53   Pulse 66   Temp 97.4  F (36.3  C)   Resp 14   Wt 108.9 kg (240 lb)   SpO2 95%   BMI 32.55 kg/m    GENERAL APPEARANCE:  in no distress, appears healthy  ENT:  Mouth and posterior oropharynx normal, moist mucous membranes  RESP:  lungs clear to auscultation , no respiratory distress  CV:  peripheral edema 2-3+ in lower legs/pedal, rate-normal  ABDOMEN:  bowel sounds normal  M/S:   Gait and station abnormal unsteady  SKIN:  Inspection of skin and subcutaneous tissue baseline  NEURO:   Examination of sensation by touch normal  PSYCH:  affect and mood normal    Labs done in SNF are in [facility]. Please refer to them using [employer]/Care Everywhere.    Assessment/Plan:  (I50.9) Chronic congestive heart failure, unspecified heart failure type (H)  (primary encounter diagnosis)  (N18.30) Stage 3 chronic kidney disease, unspecified whether stage 3a or 3b CKD (H)  (I10) Hypertension, unspecified type  Comment: BNP 3707, GFR 48, SBP's 100-110  Plan:   -continue bumex 1mg daily  -wear compression garments  -dose  meds renally  -staff to check VS as scheduled  -follow up cardiology PRN  -staff to get copy of labs to patient for review  -repeat BMP, BNP in 6-12 months    MED REC REQUIRED  Post Medication Reconciliation Status: medication reconcilation previously completed during another office visit        Electronically signed by: JITENDRA Freitas CNP

## 2024-04-10 NOTE — LETTER
4/10/2024        RE: Luz Elena Sampson  Attyasmany Sampson  [address]  [place] [address]        No notes on file      Sincerely,        JITENDRA Freitas CNP

## 2024-04-20 ENCOUNTER — HEALTH MAINTENANCE LETTER (OUTPATIENT)
Age: 84
End: 2024-04-20

## 2024-04-29 ENCOUNTER — TELEPHONE (OUTPATIENT)
Dept: GERIATRICS | Facility: CLINIC | Age: 84
End: 2024-04-29
Payer: MEDICARE

## 2024-04-29 DIAGNOSIS — G25.81 RESTLESS LEGS SYNDROME (RLS): Primary | ICD-10-CM

## 2024-04-29 RX ORDER — CARBIDOPA AND LEVODOPA 25; 100 MG/1; MG/1
1 TABLET ORAL AT BEDTIME
Qty: 60 TABLET | Refills: 11 | Status: SHIPPED | OUTPATIENT
Start: 2024-04-29 | End: 2024-04-30

## 2024-04-30 ENCOUNTER — TELEPHONE (OUTPATIENT)
Dept: GERIATRICS | Facility: CLINIC | Age: 84
End: 2024-04-30
Payer: MEDICARE

## 2024-04-30 DIAGNOSIS — G25.81 RESTLESS LEGS SYNDROME (RLS): ICD-10-CM

## 2024-04-30 RX ORDER — CARBIDOPA AND LEVODOPA 25; 100 MG/1; MG/1
1 TABLET ORAL AT BEDTIME
Qty: 60 TABLET | Refills: 11 | Status: SHIPPED | OUTPATIENT
Start: 2024-04-30 | End: 2024-05-31

## 2024-05-02 DIAGNOSIS — E78.5 DYSLIPIDEMIA: Primary | ICD-10-CM

## 2024-05-02 DIAGNOSIS — I48.19 ATRIAL FIBRILLATION, PERSISTENT (H): ICD-10-CM

## 2024-05-02 DIAGNOSIS — R05.9 COUGH: Primary | ICD-10-CM

## 2024-05-02 RX ORDER — RIVAROXABAN 15 MG/1
TABLET, FILM COATED ORAL
Qty: 30 TABLET | Refills: 11 | Status: SHIPPED | OUTPATIENT
Start: 2024-05-02

## 2024-05-02 RX ORDER — GUAIFENESIN 600 MG/1
TABLET, EXTENDED RELEASE ORAL
Qty: 60 TABLET | Refills: 11 | Status: SHIPPED | OUTPATIENT
Start: 2024-05-02

## 2024-05-02 RX ORDER — SIMVASTATIN 20 MG
TABLET ORAL
Qty: 30 TABLET | Refills: 11 | Status: SHIPPED | OUTPATIENT
Start: 2024-05-02

## 2024-05-31 ENCOUNTER — TELEPHONE (OUTPATIENT)
Dept: GERIATRICS | Facility: CLINIC | Age: 84
End: 2024-05-31
Payer: MEDICARE

## 2024-05-31 DIAGNOSIS — G25.81 RESTLESS LEGS SYNDROME (RLS): ICD-10-CM

## 2024-05-31 RX ORDER — CARBIDOPA AND LEVODOPA 25; 100 MG/1; MG/1
1 TABLET ORAL AT BEDTIME
Qty: 60 TABLET | Refills: 11 | Status: SHIPPED | OUTPATIENT
Start: 2024-05-31 | End: 2024-09-04

## 2024-06-05 ENCOUNTER — ASSISTED LIVING VISIT (OUTPATIENT)
Dept: GERIATRICS | Facility: CLINIC | Age: 84
End: 2024-06-05
Payer: MEDICARE

## 2024-06-05 VITALS
TEMPERATURE: 97.3 F | WEIGHT: 239 LBS | SYSTOLIC BLOOD PRESSURE: 119 MMHG | OXYGEN SATURATION: 97 % | HEART RATE: 63 BPM | BODY MASS INDEX: 32.41 KG/M2 | RESPIRATION RATE: 16 BRPM | DIASTOLIC BLOOD PRESSURE: 63 MMHG

## 2024-06-05 DIAGNOSIS — I50.9 CHRONIC CONGESTIVE HEART FAILURE, UNSPECIFIED HEART FAILURE TYPE (H): Primary | ICD-10-CM

## 2024-06-05 DIAGNOSIS — N18.30 STAGE 3 CHRONIC KIDNEY DISEASE, UNSPECIFIED WHETHER STAGE 3A OR 3B CKD (H): ICD-10-CM

## 2024-06-05 DIAGNOSIS — L29.9 SCALP ITCH: ICD-10-CM

## 2024-06-05 PROCEDURE — 99349 HOME/RES VST EST MOD MDM 40: CPT | Performed by: NURSE PRACTITIONER

## 2024-06-05 RX ORDER — CLOBETASOL PROPIONATE 0.5 MG/ML
SOLUTION TOPICAL 2 TIMES DAILY
Status: SHIPPED
Start: 2024-06-05

## 2024-06-05 NOTE — LETTER
6/5/2024      Luz Elena Sampson  [address]  [place] [address]        No notes on file      Sincerely,        JITENDRA Freitas CNP

## 2024-06-05 NOTE — PROGRESS NOTES
[facility] GERIATRICS    Chief Complaint   Patient presents with    RECHECK     HPI:  Luz Elena Sampson is a 83 year old  (1940), who is being seen today for an episodic care visit at: [facility] ([facility]) [[id number]]. Today's concern is:   1. Chronic congestive heart failure, unspecified heart failure type (H)    2. Stage 3 chronic kidney disease, unspecified whether stage 3a or 3b CKD (H)    3. Scalp itch      Patient seen for follow up, moderate hypervolemia with leg to pedal edema 2-3+, maintains wraps but legs dependent waking hours, denies CP, BMP 3707, recent labs creat 1.45, GFR 48, new complaint scalp itch and had medication that worked previously, overall appears healthy.    Allergies, and PMH/PSH reviewed in EPIC today.  REVIEW OF SYSTEMS:  4 point ROS including Respiratory, CV, GI and , other than that noted in the HPI,  is negative    Objective:   /63   Pulse 63   Temp 97.3  F (36.3  C)   Resp 16   Wt 108.4 kg (239 lb)   SpO2 97%   BMI 32.41 kg/m    GENERAL APPEARANCE:  in no distress, appears healthy  ENT:  Mouth and posterior oropharynx normal, moist mucous membranes  RESP:  lungs clear to auscultation , no respiratory distress  CV:  peripheral edema 2-3+ in lower extremities, rate-normal  ABDOMEN:  bowel sounds normal  M/S:   Gait and station abnormal unsteady  SKIN:  Inspection of skin and subcutaneous tissue baseline  NEURO:   Examination of sensation by touch normal  PSYCH:  affect and mood normal    Labs done in SNF are in [facility]. Please refer to them using [facility]/Care Everywhere.    Assessment/Plan:  (I50.9) Chronic congestive heart failure, unspecified heart failure type (H)  (primary encounter diagnosis)  Comment: hypervolemic, denies CP, VS WNL  Plan: continue bumex 1mg  -continue leg compression stockings  -elevate legs as possible  -consider referral for lymph if leg edema worsens    (N18.30) Stage 3 chronic kidney disease, unspecified  whether stage 3a or 3b CKD (H)  Comment: creat 1.45, GFR 48  Plan: dose meds renally  -repeat BMP with BNP in 3-6 months    (L29.9) Scalp itch  Comment: newer bilateral above ear areas, skin intact  Plan:   -start clobetasol liquid BID, OK to have and self administer    MED REC REQUIRED  Post Medication Reconciliation Status: medication reconcilation previously completed during another office visit          Electronically signed by: JITENDRA Freitas CNP

## 2024-06-19 ENCOUNTER — ASSISTED LIVING VISIT (OUTPATIENT)
Dept: GERIATRICS | Facility: CLINIC | Age: 84
End: 2024-06-19
Payer: MEDICARE

## 2024-06-19 VITALS
BODY MASS INDEX: 32.69 KG/M2 | OXYGEN SATURATION: 97 % | DIASTOLIC BLOOD PRESSURE: 53 MMHG | HEART RATE: 60 BPM | TEMPERATURE: 97.8 F | SYSTOLIC BLOOD PRESSURE: 121 MMHG | WEIGHT: 241 LBS | RESPIRATION RATE: 20 BRPM

## 2024-06-19 DIAGNOSIS — L03.116 CELLULITIS OF LEFT LEG: ICD-10-CM

## 2024-06-19 DIAGNOSIS — I50.9 CHRONIC CONGESTIVE HEART FAILURE, UNSPECIFIED HEART FAILURE TYPE (H): Primary | ICD-10-CM

## 2024-06-19 DIAGNOSIS — I73.9 PERIPHERAL VASCULAR DISEASE (H): ICD-10-CM

## 2024-06-19 PROCEDURE — 99349 HOME/RES VST EST MOD MDM 40: CPT | Performed by: NURSE PRACTITIONER

## 2024-06-19 RX ORDER — DOXYCYCLINE 100 MG/1
100 CAPSULE ORAL 2 TIMES DAILY
Status: SHIPPED
Start: 2024-06-19 | End: 2024-06-26

## 2024-06-19 RX ORDER — CEPHALEXIN 500 MG/1
500 CAPSULE ORAL 3 TIMES DAILY
Status: SHIPPED
Start: 2024-06-19 | End: 2024-06-29

## 2024-06-19 NOTE — LETTER
6/19/2024      Luz Elena Sampson  Attn Parmjit Sampson  [address]  [place] [address]        [facility]    Chief Complaint   Patient presents with     RECHECK     HPI:  Luz Elena Sampson is a 83 year old  (1940), who is being seen today for an episodic care visit at: [facility] ([facility]) [[id number]]. Today's concern is:   1. Chronic congestive heart failure, unspecified heart failure type (H)    2. Peripheral vascular disease (H24)    3. Cellulitis of left leg      Patient seen for follow up, on 6/17 reported having L leg pain and edema, has CHF and mild hypervolemia, has PVD and chronic bilateral leg edema, now left leg 50% pink tone with warmth and pain with palpation, edema 3+, firm pitting, afebrile, appears healthy, when having lower leg issues needs intervention ASAP in order to prevent hospitalization.    Allergies, and PMH/PSH reviewed in EPIC today.  REVIEW OF SYSTEMS:  4 point ROS including Respiratory, CV, GI and , other than that noted in the HPI,  is negative    Objective:   /53   Pulse 60   Temp 97.8  F (36.6  C)   Resp 20   Wt 109.3 kg (241 lb)   SpO2 97%   BMI 32.69 kg/m    GENERAL APPEARANCE:  in no distress, appears healthy  ENT:  Mouth and posterior oropharynx normal, moist mucous membranes  RESP:  lungs clear to auscultation , no respiratory distress  CV:  peripheral edema 2-3+ in lower legs, rate-normal  ABDOMEN:  bowel sounds normal  M/S:   Gait and station abnormal unsteady  SKIN:  Inspection of skin and subcutaneous tissue baseline  NEURO:   Examination of sensation by touch normal  PSYCH:  affect and mood normal    Labs done in SNF are in [facility]. Please refer to them using [employer]/Care Everywhere.    Assessment/Plan:  (I50.9) Chronic congestive heart failure, unspecified heart failure type (H)  (primary encounter diagnosis)  (I73.9) Peripheral vascular disease (H24)  (L03.116) Cellulitis of left leg  Comment: hypervolemia, edema  2-3+, severe PVD, limited circulation  Plan:   -cephalexin TID x10 days  -doxycycline BID x7 days  -CBC, BMP, BNP on 6/25  -elevate leg as possible  -take ibuprofen PRN for pain  -wear compression as tolerates  -check temp PRN  -if status declines then needs ER attention  -plan to follow up on status and labs on 6/26  -considering lymph eval and homecare if indicated    MED REC REQUIRED  Post Medication Reconciliation Status: medication reconcilation previously completed during another office visit          Electronically signed by: JITENDRA Freitas CNP          Sincerely,        JITENDRA Freitas CNP

## 2024-06-19 NOTE — PROGRESS NOTES
[facility] GERIATR[facility]    Chief Complaint   Patient presents with    RECHECK     HPI:  Luz Elena Sampson is a 83 year old  (1940), who is being seen today for an episodic care visit at: [facility] [[id number]]. Today's concern is:   1. Chronic congestive heart failure, unspecified heart failure type (H)    2. Peripheral vascular disease (H24)    3. Cellulitis of left leg      Patient seen for follow up, on 6/17 reported having L leg pain and edema, has CHF and mild hypervolemia, has PVD and chronic bilateral leg edema, now left leg 50% pink tone with warmth and pain with palpation, edema 3+, firm pitting, afebrile, appears healthy, when having lower leg issues needs intervention ASAP in order to prevent hospitalization.    Allergies, and PMH/PSH reviewed in EPIC today.  REVIEW OF SYSTEMS:  4 point ROS including Respiratory, CV, GI and , other than that noted in the HPI,  is negative    Objective:   /53   Pulse 60   Temp 97.8  F (36.6  C)   Resp 20   Wt 109.3 kg (241 lb)   SpO2 97%   BMI 32.69 kg/m    GENERAL APPEARANCE:  in no distress, appears healthy  ENT:  Mouth and posterior oropharynx normal, moist mucous membranes  RESP:  lungs clear to auscultation , no respiratory distress  CV:  peripheral edema 2-3+ in lower legs, rate-normal  ABDOMEN:  bowel sounds normal  M/S:   Gait and station abnormal unsteady  SKIN:  Inspection of skin and subcutaneous tissue baseline  NEURO:   Examination of sensation by touch normal  PSYCH:  affect and mood normal    Labs done in SNF are in [facility]. Please refer to them using EPIC/Care Everywhere.    Assessment/Plan:  (I50.9) Chronic congestive heart failure, unspecified heart failure type (H)  (primary encounter diagnosis)  (I73.9) Peripheral vascular disease (H24)  (L03.116) Cellulitis of left leg  Comment: hypervolemia, edema 2-3+, severe PVD, limited circulation  Plan:   -cephalexin TID x10 days  -doxycycline BID x7  days  -CBC, BMP, BNP on 6/25  -elevate leg as possible  -take ibuprofen PRN for pain  -wear compression as tolerates  -check temp PRN  -if status declines then needs ER attention  -plan to follow up on status and labs on 6/26  -considering lymph eval and homecare if indicated    MED REC REQUIRED  Post Medication Reconciliation Status: medication reconcilation previously completed during another office visit          Electronically signed by: JITENDRA Freitas CNP

## 2024-06-23 ENCOUNTER — LAB REQUISITION (OUTPATIENT)
Dept: LAB | Facility: CLINIC | Age: 84
End: 2024-06-23
Payer: MEDICARE

## 2024-06-23 DIAGNOSIS — N18.9 CHRONIC KIDNEY DISEASE, UNSPECIFIED: ICD-10-CM

## 2024-06-23 DIAGNOSIS — D64.9 ANEMIA, UNSPECIFIED: ICD-10-CM

## 2024-06-23 DIAGNOSIS — I50.9 HEART FAILURE, UNSPECIFIED (H): ICD-10-CM

## 2024-06-25 VITALS
BODY MASS INDEX: 32.69 KG/M2 | OXYGEN SATURATION: 97 % | HEART RATE: 60 BPM | SYSTOLIC BLOOD PRESSURE: 121 MMHG | DIASTOLIC BLOOD PRESSURE: 53 MMHG | WEIGHT: 241 LBS | RESPIRATION RATE: 20 BRPM | TEMPERATURE: 97.8 F

## 2024-06-25 LAB
ANION GAP SERPL CALCULATED.3IONS-SCNC: 10 MMOL/L (ref 7–15)
BUN SERPL-MCNC: 21.3 MG/DL (ref 8–23)
CALCIUM SERPL-MCNC: 9.4 MG/DL (ref 8.8–10.2)
CHLORIDE SERPL-SCNC: 99 MMOL/L (ref 98–107)
CREAT SERPL-MCNC: 1.35 MG/DL (ref 0.67–1.17)
DEPRECATED HCO3 PLAS-SCNC: 22 MMOL/L (ref 22–29)
EGFRCR SERPLBLD CKD-EPI 2021: 52 ML/MIN/1.73M2
ERYTHROCYTE [DISTWIDTH] IN BLOOD BY AUTOMATED COUNT: 13.2 % (ref 10–15)
HCT VFR BLD AUTO: 39.4 % (ref 40–53)
HGB BLD-MCNC: 12.9 G/DL (ref 13.3–17.7)
MCH RBC QN AUTO: 28.5 PG (ref 26.5–33)
MCHC RBC AUTO-ENTMCNC: 32.7 G/DL (ref 31.5–36.5)
MCV RBC AUTO: 87 FL (ref 78–100)
NT-PROBNP SERPL-MCNC: 3921 PG/ML (ref 0–1800)
PLATELET # BLD AUTO: 281 10E3/UL (ref 150–450)
POTASSIUM SERPL-SCNC: 4.4 MMOL/L (ref 3.4–5.3)
RBC # BLD AUTO: 4.53 10E6/UL (ref 4.4–5.9)
SODIUM SERPL-SCNC: 131 MMOL/L (ref 135–145)
WBC # BLD AUTO: 5.3 10E3/UL (ref 4–11)

## 2024-06-25 PROCEDURE — P9603 ONE-WAY ALLOW PRORATED MILES: HCPCS | Mod: ORL

## 2024-06-25 PROCEDURE — 85027 COMPLETE CBC AUTOMATED: CPT | Mod: ORL

## 2024-06-25 PROCEDURE — 83880 ASSAY OF NATRIURETIC PEPTIDE: CPT | Mod: ORL

## 2024-06-25 PROCEDURE — 80048 BASIC METABOLIC PNL TOTAL CA: CPT | Mod: ORL

## 2024-06-25 PROCEDURE — 36415 COLL VENOUS BLD VENIPUNCTURE: CPT | Mod: ORL

## 2024-06-26 ENCOUNTER — ASSISTED LIVING VISIT (OUTPATIENT)
Dept: GERIATRICS | Facility: CLINIC | Age: 84
End: 2024-06-26
Payer: MEDICARE

## 2024-06-26 ENCOUNTER — TRANSFERRED RECORDS (OUTPATIENT)
Dept: HEALTH INFORMATION MANAGEMENT | Facility: CLINIC | Age: 84
End: 2024-06-26

## 2024-06-26 DIAGNOSIS — I50.9 CHRONIC CONGESTIVE HEART FAILURE, UNSPECIFIED HEART FAILURE TYPE (H): Primary | ICD-10-CM

## 2024-06-26 DIAGNOSIS — L03.116 CELLULITIS OF LEFT LEG: ICD-10-CM

## 2024-06-26 DIAGNOSIS — I73.9 PERIPHERAL VASCULAR DISEASE (H): ICD-10-CM

## 2024-06-26 LAB — GLUCOSE SERPL-MCNC: 91 MG/DL (ref 70–99)

## 2024-06-26 PROCEDURE — 99349 HOME/RES VST EST MOD MDM 40: CPT | Performed by: NURSE PRACTITIONER

## 2024-06-26 NOTE — PROGRESS NOTES
[facility]ERIATR[facility]    Chief Complaint   Patient presents with    RECHECK     HPI:  Luz Elena Sampson is a 83 year old  (1940), who is being seen today for an episodic care visit at: [facility] ([facility]) [[id number]]. Today's concern is:   1. Chronic congestive heart failure, unspecified heart failure type (H)    2. Peripheral vascular disease (H24)    3. Cellulitis of left leg      Patient seen for follow up, no overt s/s CHF exacerbation, mild hypervolemia, BNP 3921, GFR 52, SBP's in the 120 range, poor LE circulation, edema 2+ R leg and 3+ L leg, newer cellulitis L leg and treating with ABX, pain warmth and red tone improved significantly, afebrile, reports feeling well, ambulatory with walker.    Allergies, and PMH/PSH reviewed in EPIC today.  REVIEW OF SYSTEMS:  4 point ROS including Respiratory, CV, GI and , other than that noted in the HPI,  is negative    Objective:   /53   Pulse 60   Temp 97.8  F (36.6  C)   Resp 20   Wt 109.3 kg (241 lb)   SpO2 97%   BMI 32.69 kg/m    GENERAL APPEARANCE:  in no distress, appears healthy  ENT:  Mouth and posterior oropharynx normal, moist mucous membranes  RESP:  lungs clear to auscultation , no respiratory distress  CV:  peripheral edema 2-3+ in lower legs/pedal, rate-normal  ABDOMEN:  bowel sounds normal  M/S:   Gait and station abnormal unsteady  SKIN:  Inspection of skin and subcutaneous tissue baseline  NEURO:   Examination of sensation by touch normal  PSYCH:  affect and mood normal    Labs done in SNF are in [place] [facility]. Please refer to them using [facility]/Care Everywhere.    Assessment/Plan:  (I50.9) Chronic congestive heart failure, unspecified heart failure type (H)  (primary encounter diagnosis)  (I73.9) Peripheral vascular disease (H24)  (L03.116) Cellulitis of left leg  Comment: mild hypervolemia, SBP's soft, creat elevated, L leg status 3+ improved cellulitis  Plan:   -lymph eval/treat for edema  -continue current  compression  -US venous L leg, results pending  -completed doxy x7d  -continue cephalexin x10 days through 6/27  -to seek nursing if not feeling well or has new open area on legs for fast intervention  -elevate legs as possible  -continue bumex and xarelto  -if DVT+ plan to initiate lovenox x14 days then recheck US    MED REC REQUIRED  Post Medication Reconciliation Status: medication reconcilation previously completed during another office visit        Electronically signed by: JITENDRA Freitas CNP

## 2024-06-26 NOTE — LETTER
6/26/2024      Luz Elena Sampson  Attn Parmjit Sampson  [address]  [place] [address]        [facility]    Chief Complaint   Patient presents with     RECHECK     HPI:  Luz Elena Sampson is a 83 year old  (1940), who is being seen today for an episodic care visit at: [facility] [[id number]]. Today's concern is:   1. Chronic congestive heart failure, unspecified heart failure type (H)    2. Peripheral vascular disease (H24)    3. Cellulitis of left leg      Patient seen for follow up, no overt s/s CHF exacerbation, mild hypervolemia, BNP 3921, GFR 52, SBP's in the 120 range, poor LE circulation, edema 2+ R leg and 3+ L leg, newer cellulitis L leg and treating with ABX, pain warmth and red tone improved significantly, afebrile, reports feeling well, ambulatory with walker.    Allergies, and PMH/PSH reviewed in EPIC today.  REVIEW OF SYSTEMS:  4 point ROS including Respiratory, CV, GI and , other than that noted in the HPI,  is negative    Objective:   /53   Pulse 60   Temp 97.8  F (36.6  C)   Resp 20   Wt 109.3 kg (241 lb)   SpO2 97%   BMI 32.69 kg/m    GENERAL APPEARANCE:  in no distress, appears healthy  ENT:  Mouth and posterior oropharynx normal, moist mucous membranes  RESP:  lungs clear to auscultation , no respiratory distress  CV:  peripheral edema 2-3+ in lower legs/pedal, rate-normal  ABDOMEN:  bowel sounds normal  M/S:   Gait and station abnormal unsteady  SKIN:  Inspection of skin and subcutaneous tissue baseline  NEURO:   Examination of sensation by touch normal  PSYCH:  affect and mood normal    Labs done in SNF are in [facility]. Please refer to them using [employer]/Care Everywhere.    Assessment/Plan:  (I50.9) Chronic congestive heart failure, unspecified heart failure type (H)  (primary encounter diagnosis)  (I73.9) Peripheral vascular disease (H24)  (L03.116) Cellulitis of left leg  Comment: mild hypervolemia, SBP's soft, creat  elevated, L leg status 3+ improved cellulitis  Plan:   -lymph eval/treat for edema  -continue current compression  -US venous L leg, results pending  -completed doxy x7d  -continue cephalexin x10 days through 6/27  -to seek nursing if not feeling well or has new open area on legs for fast intervention  -elevate legs as possible  -continue bumex and xarelto  -if DVT+ plan to initiate lovenox x14 days then recheck US    MED REC REQUIRED  Post Medication Reconciliation Status: medication reconcilation previously completed during another office visit        Electronically signed by: JITENDRA Freitas CNP          Sincerely,        JITENDRA Freitas CNP

## 2024-07-03 ENCOUNTER — ASSISTED LIVING VISIT (OUTPATIENT)
Dept: GERIATRICS | Facility: CLINIC | Age: 84
End: 2024-07-03
Payer: MEDICARE

## 2024-07-03 VITALS
HEART RATE: 60 BPM | TEMPERATURE: 97.8 F | OXYGEN SATURATION: 97 % | WEIGHT: 241 LBS | RESPIRATION RATE: 20 BRPM | DIASTOLIC BLOOD PRESSURE: 53 MMHG | SYSTOLIC BLOOD PRESSURE: 121 MMHG | BODY MASS INDEX: 32.69 KG/M2

## 2024-07-03 DIAGNOSIS — L03.116 CELLULITIS OF LEFT LEG: ICD-10-CM

## 2024-07-03 DIAGNOSIS — I50.9 CHRONIC CONGESTIVE HEART FAILURE, UNSPECIFIED HEART FAILURE TYPE (H): Primary | ICD-10-CM

## 2024-07-03 DIAGNOSIS — I73.9 PERIPHERAL VASCULAR DISEASE (H): ICD-10-CM

## 2024-07-03 PROCEDURE — 99349 HOME/RES VST EST MOD MDM 40: CPT | Performed by: NURSE PRACTITIONER

## 2024-07-03 NOTE — LETTER
7/3/2024      Luz Elena Sampson  Attn Parmjit Sampson  [address]  [place] [address]        [facility]    Chief Complaint   Patient presents with     RECHECK     HPI:  Luz Elena Sampson is a 83 year old  (1940), who is being seen today for an episodic care visit at: [facility] ([facility]) [[id number]]. Today's concern is:   1. Chronic congestive heart failure, unspecified heart failure type (H)    2. Peripheral vascular disease (H24)    3. Cellulitis of left leg      Patient seen for follow up, increased edema both legs but L leg now 4+ with thierry tight skin and potential for weeping, normal temp/tone, afebrile, complaint with compression, US venous on 6/26 negative DVT, completed courses of cephalexin and doxycycline and tolerated well, no SOB, mild hypervolemia, BNP 3921, WBC 5.3, poor circulation.    Allergies, and PMH/PSH reviewed in EPIC today.  REVIEW OF SYSTEMS:  4 point ROS including Respiratory, CV, GI and , other than that noted in the HPI,  is negative    Objective:   /53   Pulse 60   Temp 97.8  F (36.6  C)   Resp 20   Wt 109.3 kg (241 lb)   SpO2 97%   BMI 32.69 kg/m    GENERAL APPEARANCE:  in no distress, appears healthy  ENT:  Mouth and posterior oropharynx normal, moist mucous membranes  RESP:  lungs clear to auscultation , no respiratory distress  CV:  peripheral edema 2-4+ in lower legs  ABDOMEN:  bowel sounds normal  M/S:   Gait and station abnormal unsteady  SKIN:  Inspection of skin and subcutaneous tissue baseline  NEURO:   Examination of sensation by touch normal  PSYCH:  affect and mood normal    Labs done in SNF are in [facility]. Please refer to them using [employer]/Care Everywhere.    Assessment/Plan:  (I50.9) Chronic congestive heart failure, unspecified heart failure type (H)  (primary encounter diagnosis)  (I73.9) Peripheral vascular disease (H24)  (L03.116) Cellulitis of left leg  Comment: BNP 3921, GFR 52, SBP's 120 range,  hypervolemic, edema 4+ L leg and pedal, US neg DVT  Plan:   -completed cephalexin 6/28, doxycycline 6/25  -continue compression stockings  -lymph eval and treat starting 7/8  -PT/OT working with  -elevate legs as possible  -continue bumex 1mg, xarelto  -consider recheck BMP in 1-2 months    MED REC REQUIRED  Post Medication Reconciliation Status: medication reconcilation previously completed during another office visit        Electronically signed by: JITENDRA Freitas CNP          Sincerely,        JITENDRA Freitas CNP

## 2024-07-03 NOTE — PROGRESS NOTES
[facility] GERIATRICS    Chief Complaint   Patient presents with    RECHECK     HPI:  Luz Elena Sampson is a 83 year old  (1940), who is being seen today for an episodic care visit at: [facility] ([facility]) [[id number]]. Today's concern is:   1. Chronic congestive heart failure, unspecified heart failure type (H)    2. Peripheral vascular disease (H24)    3. Cellulitis of left leg      Patient seen for follow up, increased edema both legs but L leg now 4+ with thierry tight skin and potential for weeping, normal temp/tone, afebrile, complaint with compression, US venous on 6/26 negative DVT, completed courses of cephalexin and doxycycline and tolerated well, no SOB, mild hypervolemia, BNP 3921, WBC 5.3, poor circulation.    Allergies, and PMH/PSH reviewed in EPIC today.  REVIEW OF SYSTEMS:  4 point ROS including Respiratory, CV, GI and , other than that noted in the HPI,  is negative    Objective:   /53   Pulse 60   Temp 97.8  F (36.6  C)   Resp 20   Wt 109.3 kg (241 lb)   SpO2 97%   BMI 32.69 kg/m    GENERAL APPEARANCE:  in no distress, appears healthy  ENT:  Mouth and posterior oropharynx normal, moist mucous membranes  RESP:  lungs clear to auscultation , no respiratory distress  CV:  peripheral edema 2-4+ in lower legs  ABDOMEN:  bowel sounds normal  M/S:   Gait and station abnormal unsteady  SKIN:  Inspection of skin and subcutaneous tissue baseline  NEURO:   Examination of sensation by touch normal  PSYCH:  affect and mood normal    Labs done in SNF are in [facility]. Please refer to them using [employer]/Care Everywhere.    Assessment/Plan:  (I50.9) Chronic congestive heart failure, unspecified heart failure type (H)  (primary encounter diagnosis)  (I73.9) Peripheral vascular disease (H24)  (L03.116) Cellulitis of left leg  Comment: BNP 3921, GFR 52, SBP's 120 range, hypervolemic, edema 4+ L leg and pedal, US neg DVT  Plan:   -completed cephalexin 6/28, doxycycline  6/25  -continue compression stockings  -lymph eval and treat starting 7/8  -PT/OT working with  -elevate legs as possible  -continue bumex 1mg, xarelto  -consider recheck BMP in 1-2 months    MED REC REQUIRED  Post Medication Reconciliation Status: medication reconcilation previously completed during another office visit        Electronically signed by: JITENDRA Freitas CNP

## 2024-07-15 ENCOUNTER — LAB REQUISITION (OUTPATIENT)
Dept: LAB | Facility: CLINIC | Age: 84
End: 2024-07-15
Payer: MEDICARE

## 2024-07-15 DIAGNOSIS — I50.9 HEART FAILURE, UNSPECIFIED (H): ICD-10-CM

## 2024-07-15 DIAGNOSIS — D64.9 ANEMIA, UNSPECIFIED: ICD-10-CM

## 2024-07-15 DIAGNOSIS — N18.9 CHRONIC KIDNEY DISEASE, UNSPECIFIED: ICD-10-CM

## 2024-07-16 LAB
ERYTHROCYTE [DISTWIDTH] IN BLOOD BY AUTOMATED COUNT: 14.2 % (ref 10–15)
HCT VFR BLD AUTO: 36.4 % (ref 40–53)
HGB BLD-MCNC: 12.1 G/DL (ref 13.3–17.7)
MCH RBC QN AUTO: 28.4 PG (ref 26.5–33)
MCHC RBC AUTO-ENTMCNC: 33.2 G/DL (ref 31.5–36.5)
MCV RBC AUTO: 85 FL (ref 78–100)
PLATELET # BLD AUTO: 138 10E3/UL (ref 150–450)
RBC # BLD AUTO: 4.26 10E6/UL (ref 4.4–5.9)
WBC # BLD AUTO: 5.4 10E3/UL (ref 4–11)

## 2024-07-16 PROCEDURE — 85027 COMPLETE CBC AUTOMATED: CPT | Mod: ORL | Performed by: NURSE PRACTITIONER

## 2024-07-16 PROCEDURE — 83880 ASSAY OF NATRIURETIC PEPTIDE: CPT | Mod: ORL | Performed by: NURSE PRACTITIONER

## 2024-07-16 PROCEDURE — 80048 BASIC METABOLIC PNL TOTAL CA: CPT | Mod: ORL | Performed by: NURSE PRACTITIONER

## 2024-07-16 PROCEDURE — P9603 ONE-WAY ALLOW PRORATED MILES: HCPCS | Mod: ORL | Performed by: NURSE PRACTITIONER

## 2024-07-16 PROCEDURE — 36415 COLL VENOUS BLD VENIPUNCTURE: CPT | Mod: ORL | Performed by: NURSE PRACTITIONER

## 2024-07-17 ENCOUNTER — ASSISTED LIVING VISIT (OUTPATIENT)
Dept: GERIATRICS | Facility: CLINIC | Age: 84
End: 2024-07-17
Payer: MEDICARE

## 2024-07-17 VITALS
BODY MASS INDEX: 32.69 KG/M2 | RESPIRATION RATE: 20 BRPM | TEMPERATURE: 97.8 F | DIASTOLIC BLOOD PRESSURE: 53 MMHG | WEIGHT: 241 LBS | HEART RATE: 60 BPM | SYSTOLIC BLOOD PRESSURE: 121 MMHG | OXYGEN SATURATION: 97 %

## 2024-07-17 DIAGNOSIS — I73.9 PERIPHERAL VASCULAR DISEASE (H): ICD-10-CM

## 2024-07-17 DIAGNOSIS — N18.30 STAGE 3 CHRONIC KIDNEY DISEASE, UNSPECIFIED WHETHER STAGE 3A OR 3B CKD (H): ICD-10-CM

## 2024-07-17 DIAGNOSIS — I50.9 CHRONIC CONGESTIVE HEART FAILURE, UNSPECIFIED HEART FAILURE TYPE (H): Primary | ICD-10-CM

## 2024-07-17 LAB
ANION GAP SERPL CALCULATED.3IONS-SCNC: 12 MMOL/L (ref 7–15)
BUN SERPL-MCNC: 24.2 MG/DL (ref 8–23)
CALCIUM SERPL-MCNC: 9 MG/DL (ref 8.8–10.4)
CHLORIDE SERPL-SCNC: 99 MMOL/L (ref 98–107)
CREAT SERPL-MCNC: 1.64 MG/DL (ref 0.67–1.17)
EGFRCR SERPLBLD CKD-EPI 2021: 41 ML/MIN/1.73M2
GLUCOSE SERPL-MCNC: 108 MG/DL (ref 70–99)
HCO3 SERPL-SCNC: 23 MMOL/L (ref 22–29)
NT-PROBNP SERPL-MCNC: 4415 PG/ML (ref 0–1800)
POTASSIUM SERPL-SCNC: 4.3 MMOL/L (ref 3.4–5.3)
SODIUM SERPL-SCNC: 134 MMOL/L (ref 135–145)

## 2024-07-17 PROCEDURE — 99349 HOME/RES VST EST MOD MDM 40: CPT | Performed by: NURSE PRACTITIONER

## 2024-07-17 NOTE — LETTER
7/17/2024      Luz Elena Sampson  Attn Parmjit Sampson  [address]  [place] [address]        [facility]    Chief Complaint   Patient presents with     RECHECK     HPI:  Luz Elena Sampson is a 83 year old  (1940), who is being seen today for an episodic care visit at: [facility] ([facility]) [[id number]]. Today's concern is:   1. Chronic congestive heart failure, unspecified heart failure type (H)    2. Peripheral vascular disease (H24)    3. Stage 3 chronic kidney disease, unspecified whether stage 3a or 3b CKD (H)      Patient seen for follow up, BNP today up to 4415, mild hypervolemia with lungs clear, SBP's soft and leg plus pedal edema 2-3+ and improving, leg cellulitis cleared after ABX and now has lymph wraps on and tolerating well, very compliant with compression and elevation, also today creat 1.64, GFR 41 both slightly worse than previous, overall appears healthy.    Allergies, and PMH/PSH reviewed in EPIC today.  REVIEW OF SYSTEMS:  4 point ROS including Respiratory, CV, GI and , other than that noted in the HPI,  is negative    Objective:   /53   Pulse 60   Temp 97.8  F (36.6  C)   Resp 20   Wt 109.3 kg (241 lb)   SpO2 97%   BMI 32.69 kg/m    GENERAL APPEARANCE:  in no distress, appears healthy  ENT:  Mouth and posterior oropharynx normal, moist mucous membranes  RESP:  lungs clear to auscultation , no respiratory distress  CV:  peripheral edema 2-3+ in legs/pedal, rate-normal  ABDOMEN:  bowel sounds normal  M/S:   Gait and station abnormal using walker  SKIN:  Inspection of skin and subcutaneous tissue baseline  NEURO:   Examination of sensation by touch normal  PSYCH:  affect and mood normal    Labs done in SNF are in [facility]. Please refer to them using [employer]/Care Everywhere.    Assessment/Plan:  (I50.9) Chronic congestive heart failure, unspecified heart failure type (H)  (primary encounter diagnosis)  (I73.9) Peripheral vascular disease  (H24)  Comment: mild hypervolemia, edema+, poor circulation, BNP 4415  Plan: continue bumex 1mg  -monitor renal/BP/edema status for balance  -lymph wraps on 24/7  -elevate legs as possible  -will contact therapy RE; lymph wraps 24/7 and monitor skin for any issues  -repeat BNP 2-3 months    (N18.30) Stage 3 chronic kidney disease, unspecified whether stage 3a or 3b CKD (H)  Comment: creat 1.64, GFR 41  Plan: dose meds renally  -repeat BMP 2-3 months    MED REC REQUIRED  Post Medication Reconciliation Status: medication reconcilation previously completed during another office visit          Electronically signed by: JITENDRA Freitas CNP          Sincerely,        JITENDRA Freitas CNP

## 2024-07-17 NOTE — PROGRESS NOTES
[facility] GERIATRICS    Chief Complaint   Patient presents with    RECHECK     HPI:  Luz Elena Sampson is a 83 year old  (1940), who is being seen today for an episodic care visit at: [facility] (S) [[id number]]. Today's concern is:   1. Chronic congestive heart failure, unspecified heart failure type (H)    2. Peripheral vascular disease (H24)    3. Stage 3 chronic kidney disease, unspecified whether stage 3a or 3b CKD (H)      Patient seen for follow up, BNP today up to 4415, mild hypervolemia with lungs clear, SBP's soft and leg plus pedal edema 2-3+ and improving, leg cellulitis cleared after ABX and now has lymph wraps on and tolerating well, very compliant with compression and elevation, also today creat 1.64, GFR 41 both slightly worse than previous, overall appears healthy.    Allergies, and PMH/PSH reviewed in EPIC today.  REVIEW OF SYSTEMS:  4 point ROS including Respiratory, CV, GI and , other than that noted in the HPI,  is negative    Objective:   /53   Pulse 60   Temp 97.8  F (36.6  C)   Resp 20   Wt 109.3 kg (241 lb)   SpO2 97%   BMI 32.69 kg/m    GENERAL APPEARANCE:  in no distress, appears healthy  ENT:  Mouth and posterior oropharynx normal, moist mucous membranes  RESP:  lungs clear to auscultation , no respiratory distress  CV:  peripheral edema 2-3+ in legs/pedal, rate-normal  ABDOMEN:  bowel sounds normal  M/S:   Gait and station abnormal using walker  SKIN:  Inspection of skin and subcutaneous tissue baseline  NEURO:   Examination of sensation by touch normal  PSYCH:  affect and mood normal    Labs done in SNF are in [facility]. Please refer to them using [employer]/Care Everywhere.    Assessment/Plan:  (I50.9) Chronic congestive heart failure, unspecified heart failure type (H)  (primary encounter diagnosis)  (I73.9) Peripheral vascular disease (H24)  Comment: mild hypervolemia, edema+, poor circulation, BNP 4415  Plan: continue bumex  1mg  -monitor renal/BP/edema status for balance  -lymph wraps on 24/7  -elevate legs as possible  -will contact therapy RE; lymph wraps 24/7 and monitor skin for any issues  -repeat BNP 2-3 months    (N18.30) Stage 3 chronic kidney disease, unspecified whether stage 3a or 3b CKD (H)  Comment: creat 1.64, GFR 41  Plan: dose meds renally  -repeat BMP 2-3 months    MED REC REQUIRED  Post Medication Reconciliation Status: medication reconcilation previously completed during another office visit          Electronically signed by: JITENDRA Freitas CNP

## 2024-08-07 ENCOUNTER — ASSISTED LIVING VISIT (OUTPATIENT)
Dept: GERIATRICS | Facility: CLINIC | Age: 84
End: 2024-08-07
Payer: MEDICARE

## 2024-08-07 VITALS
OXYGEN SATURATION: 94 % | WEIGHT: 250 LBS | HEART RATE: 58 BPM | SYSTOLIC BLOOD PRESSURE: 132 MMHG | RESPIRATION RATE: 18 BRPM | BODY MASS INDEX: 33.91 KG/M2 | TEMPERATURE: 97.4 F | DIASTOLIC BLOOD PRESSURE: 66 MMHG

## 2024-08-07 DIAGNOSIS — I73.9 PERIPHERAL VASCULAR DISEASE (H): ICD-10-CM

## 2024-08-07 DIAGNOSIS — N18.30 STAGE 3 CHRONIC KIDNEY DISEASE, UNSPECIFIED WHETHER STAGE 3A OR 3B CKD (H): ICD-10-CM

## 2024-08-07 DIAGNOSIS — I50.9 CHRONIC CONGESTIVE HEART FAILURE, UNSPECIFIED HEART FAILURE TYPE (H): Primary | ICD-10-CM

## 2024-08-07 PROCEDURE — 99349 HOME/RES VST EST MOD MDM 40: CPT | Performed by: NURSE PRACTITIONER

## 2024-08-07 NOTE — PROGRESS NOTES
[facility] GERIATRICS    Chief Complaint   Patient presents with    RECHECK     HPI:  Luz Elena Sampson is a 83 year old  (1940), who is being seen today for an episodic care visit at: [facility] ([facility]) [[id number]]. Today's concern is:   1. Chronic congestive heart failure, unspecified heart failure type (H)    2. Stage 3 chronic kidney disease, unspecified whether stage 3a or 3b CKD (H)    3. Peripheral vascular disease (H24)      Patient seen for follow up, reviewed recent labs with BNP 4415, creat 1.64 and GFR 41, lungs clear, no SOB with mild activity, able to accomplish ADL's, leg edema 2-3+, SBP's in the 100-130 range, fall risk, attempting to balance hypotension, edema, heart and renal function.    Allergies, and PMH/PSH reviewed in EPIC today.  REVIEW OF SYSTEMS:  4 point ROS including Respiratory, CV, GI and , other than that noted in the HPI,  is negative    Objective:   /66   Pulse 58   Temp 97.4  F (36.3  C)   Resp 18   Wt 113.4 kg (250 lb)   SpO2 94%   BMI 33.91 kg/m    GENERAL APPEARANCE:  in no distress, appears healthy  ENT:  Mouth and posterior oropharynx normal, moist mucous membranes  RESP:  lungs clear to auscultation , no respiratory distress  CV:  peripheral edema 2+ in lower legs, rate-normal  ABDOMEN:  bowel sounds normal  M/S:   Gait and station abnormal unsteady  SKIN:  Inspection of skin and subcutaneous tissue baseline  NEURO:   Examination of sensation by touch normal  PSYCH:  affect and mood normal    Labs done in SNF are in [facility]. Please refer to them using [employer]/Care Everywhere.    Assessment/Plan:  (I50.9) Chronic congestive heart failure, unspecified heart failure type (H)  (primary encounter diagnosis)  Comment: BNP 4415, no SOB  Plan: continue xarelto, statin and bumex  -staff to check VS as scheduled  -repeat BNP in 3-6 months    (N18.30) Stage 3 chronic kidney disease, unspecified whether stage 3a or 3b CKD (H)  Comment:  creat 1.64, GFR 41  Plan: dose meds renally  -keep bumex on board (see above)  -encourage fluids  -follow up BMP in 3-6 months    (I73.9) Peripheral vascular disease (H24)  Comment: poor circulation, 2+ edema, cellulitis risk, negative DVT  Plan: lymph eval and treat for edema  -encourage elevation  -continue bumex    MED REC REQUIRED  Post Medication Reconciliation Status: medication reconcilation previously completed during another office visit        Electronically signed by: JITENDRA Freitas CNP

## 2024-09-04 ENCOUNTER — ASSISTED LIVING VISIT (OUTPATIENT)
Dept: GERIATRICS | Facility: CLINIC | Age: 84
End: 2024-09-04
Payer: MEDICARE

## 2024-09-04 VITALS
OXYGEN SATURATION: 94 % | BODY MASS INDEX: 33.91 KG/M2 | DIASTOLIC BLOOD PRESSURE: 66 MMHG | RESPIRATION RATE: 18 BRPM | SYSTOLIC BLOOD PRESSURE: 132 MMHG | WEIGHT: 250 LBS | HEART RATE: 58 BPM | TEMPERATURE: 97.4 F

## 2024-09-04 DIAGNOSIS — G25.81 RESTLESS LEGS SYNDROME (RLS): ICD-10-CM

## 2024-09-04 DIAGNOSIS — K21.9 GASTROESOPHAGEAL REFLUX DISEASE WITHOUT ESOPHAGITIS: ICD-10-CM

## 2024-09-04 DIAGNOSIS — I50.9 CHRONIC CONGESTIVE HEART FAILURE, UNSPECIFIED HEART FAILURE TYPE (H): Primary | ICD-10-CM

## 2024-09-04 PROCEDURE — 99349 HOME/RES VST EST MOD MDM 40: CPT | Performed by: NURSE PRACTITIONER

## 2024-09-04 RX ORDER — CARBIDOPA AND LEVODOPA 25; 100 MG/1; MG/1
1 TABLET ORAL
Qty: 1 TABLET | Refills: 0 | Status: SHIPPED
Start: 2024-09-04

## 2024-09-04 RX ORDER — PANTOPRAZOLE SODIUM 20 MG/1
40 TABLET, DELAYED RELEASE ORAL 2 TIMES DAILY
Status: SHIPPED
Start: 2024-09-04

## 2024-09-04 NOTE — LETTER
9/4/2024      Luz Elena Sampson  Attn Parmjit Sampson  [address]  [place] [address]        [facility] GERIATRICS    Chief Complaint   Patient presents with     RECHECK     HPI:  Luz Elena Sampson is a 83 year old  (1940), who is being seen today for an episodic care visit at: [facility] ([facility]) [[id number]]. Today's concern is:   1. Chronic congestive heart failure, unspecified heart failure type (H)    2. Restless legs syndrome (RLS)    3. Gastroesophageal reflux disease without esophagitis      Patient seen for follow up, no overt s/s CHF exacerbation, BNP 4415, lungs clear, leg edema 2+, no s/s cellulitis, also issue with sinemet that he takes for RLS as only wants to take at night PRN versus scheduled, RLS intermittent at night only, also complaint of gut discomfort and had been on PPI previously and requesting to restart again, overall healthy, independent.    Allergies, and PMH/PSH reviewed in EPIC today.  REVIEW OF SYSTEMS:  4 point ROS including Respiratory, CV, GI and , other than that noted in the HPI,  is negative    Objective:   /66   Pulse 58   Temp 97.4  F (36.3  C)   Resp 18   Wt 113.4 kg (250 lb)   SpO2 94%   BMI 33.91 kg/m    GENERAL APPEARANCE:  in no distress, appears healthy  ENT:  Mouth and posterior oropharynx normal, moist mucous membranes  RESP:  lungs clear to auscultation , no respiratory distress  CV:  peripheral edema 2+ in lower legs, rate-normal  ABDOMEN:  bowel sounds normal  M/S:   Gait and station abnormal unsteady  SKIN:  Inspection of skin and subcutaneous tissue baseline  NEURO:   Examination of sensation by touch normal  PSYCH:  affect and mood normal    Labs done in SNF are in [facility]. Please refer to them using [employer]/Care Everywhere.    Assessment/Plan:  (I50.9) Chronic congestive heart failure, unspecified heart failure type (H)  (primary encounter diagnosis)  Comment: edema 2+, BNP 4415  Plan: continue bumex,  xarelto  -repeat BMP, BNP in 3-6 months  -ordered Juxtalite 30-40mmHg wraps for legs and feet  -continue lymph wrapping until wraps arrive    (G25.81) Restless legs syndrome (RLS)  Comment: intermittent symptoms night only  Plan:   -change sinemet to at bedtime PRN    (K21.9) Gastroesophageal reflux disease without esophagitis  Comment: having gut discomfort again  Plan:   -start pantoprazole 20mg BID  -consider TUMs for breakthrough gut issues    MED REC REQUIRED  Post Medication Reconciliation Status: medication reconcilation previously completed during another office visit      Electronically signed by: JITENDRA Freitas CNP          Sincerely,        JITENDRA Freitas CNP

## 2024-09-04 NOTE — PROGRESS NOTES
[facility] GERIATRICS    Chief Complaint   Patient presents with    RECHECK     HPI:  Luz Elena Sampson is a 83 year old  (1940), who is being seen today for an episodic care visit at: [facility] ([facility]) [[id number]]. Today's concern is:   1. Chronic congestive heart failure, unspecified heart failure type (H)    2. Restless legs syndrome (RLS)    3. Gastroesophageal reflux disease without esophagitis      Patient seen for follow up, no overt s/s CHF exacerbation, BNP 4415, lungs clear, leg edema 2+, no s/s cellulitis, also issue with sinemet that he takes for RLS as only wants to take at night PRN versus scheduled, RLS intermittent at night only, also complaint of gut discomfort and had been on PPI previously and requesting to restart again, overall healthy, independent.    Allergies, and PMH/PSH reviewed in EPIC today.  REVIEW OF SYSTEMS:  4 point ROS including Respiratory, CV, GI and , other than that noted in the HPI,  is negative    Objective:   /66   Pulse 58   Temp 97.4  F (36.3  C)   Resp 18   Wt 113.4 kg (250 lb)   SpO2 94%   BMI 33.91 kg/m    GENERAL APPEARANCE:  in no distress, appears healthy  ENT:  Mouth and posterior oropharynx normal, moist mucous membranes  RESP:  lungs clear to auscultation , no respiratory distress  CV:  peripheral edema 2+ in lower legs, rate-normal  ABDOMEN:  bowel sounds normal  M/S:   Gait and station abnormal unsteady  SKIN:  Inspection of skin and subcutaneous tissue baseline  NEURO:   Examination of sensation by touch normal  PSYCH:  affect and mood normal    Labs done in SNF are in [facility]. Please refer to them using [employer]/Care Everywhere.    Assessment/Plan:  (I50.9) Chronic congestive heart failure, unspecified heart failure type (H)  (primary encounter diagnosis)  Comment: edema 2+, BNP 4415  Plan: continue bumex, xarelto  -repeat BMP, BNP in 3-6 months  -ordered Juxtalite 30-40mmHg wraps for legs and feet  -continue  lymph wrapping until wraps arrive    (G25.81) Restless legs syndrome (RLS)  Comment: intermittent symptoms night only  Plan:   -change sinemet to at bedtime PRN    (K21.9) Gastroesophageal reflux disease without esophagitis  Comment: having gut discomfort again  Plan:   -start pantoprazole 20mg BID  -consider TUMs for breakthrough gut issues    MED REC REQUIRED  Post Medication Reconciliation Status: medication reconcilation previously completed during another office visit      Electronically signed by: JITENDRA Freitas CNP

## 2024-09-05 ENCOUNTER — PATIENT OUTREACH (OUTPATIENT)
Dept: CARE COORDINATION | Facility: CLINIC | Age: 84
End: 2024-09-05
Payer: MEDICARE

## 2024-09-07 ENCOUNTER — HEALTH MAINTENANCE LETTER (OUTPATIENT)
Age: 84
End: 2024-09-07

## 2024-09-10 VITALS
DIASTOLIC BLOOD PRESSURE: 66 MMHG | BODY MASS INDEX: 33.91 KG/M2 | SYSTOLIC BLOOD PRESSURE: 132 MMHG | HEART RATE: 58 BPM | OXYGEN SATURATION: 94 % | TEMPERATURE: 97.4 F | WEIGHT: 250 LBS | RESPIRATION RATE: 18 BRPM

## 2024-09-11 ENCOUNTER — ASSISTED LIVING VISIT (OUTPATIENT)
Dept: GERIATRICS | Facility: CLINIC | Age: 84
End: 2024-09-11
Payer: MEDICARE

## 2024-09-11 DIAGNOSIS — N18.30 STAGE 3 CHRONIC KIDNEY DISEASE, UNSPECIFIED WHETHER STAGE 3A OR 3B CKD (H): ICD-10-CM

## 2024-09-11 DIAGNOSIS — I73.9 PERIPHERAL VASCULAR DISEASE (H): ICD-10-CM

## 2024-09-11 DIAGNOSIS — I50.9 CHRONIC CONGESTIVE HEART FAILURE, UNSPECIFIED HEART FAILURE TYPE (H): Primary | ICD-10-CM

## 2024-09-11 PROCEDURE — 99349 HOME/RES VST EST MOD MDM 40: CPT | Performed by: NURSE PRACTITIONER

## 2024-09-11 NOTE — LETTER
9/11/2024      Luz Elena Sampson  Attn Parmjit Sampson  [address]  [place] [address]        [facility]    Chief Complaint   Patient presents with     RECHECK     HPI:  Luz Elena Sampson is a 83 year old  (1940), who is being seen today for an episodic care visit at: [facility] (S) [[id number]]. Today's concern is:   1. Chronic congestive heart failure, unspecified heart failure type (H)    2. Peripheral vascular disease (H24)    3. Stage 3 chronic kidney disease, unspecified whether stage 3a or 3b CKD (H)      Patient seen for follow up, CHF with mild symptoms, BNP 4415, leg edema, lungs clear, slightly hypervolemic, legs thierry with edema 1-2+, also L pedal edema, crack on top of left foot between great and #2 scant bleeding, denies pain, also creat 1.64, GFR 41.    Allergies, and PMH/PSH reviewed in EPIC today.  REVIEW OF SYSTEMS:  4 point ROS including Respiratory, CV, GI and , other than that noted in the HPI,  is negative    Objective:   /66   Pulse 58   Temp 97.4  F (36.3  C)   Resp 18   Wt 113.4 kg (250 lb)   SpO2 94%   BMI 33.91 kg/m    GENERAL APPEARANCE:  in no distress, appears healthy  ENT:  Mouth and posterior oropharynx normal, moist mucous membranes  RESP:  lungs clear to auscultation , no respiratory distress  CV:  peripheral edema 1-2+ in lower legs, rate-normal  ABDOMEN:  bowel sounds normal  M/S:   Gait and station abnormal unsteady  SKIN:  Inspection of skin and subcutaneous tissue baseline  NEURO:   Examination of sensation by touch normal  PSYCH:  affect and mood normal    Labs done in SNF are in [facility]. Please refer to them using [employer]/Care Everywhere.    Assessment/Plan:  (I50.9) Chronic congestive heart failure, unspecified heart failure type (H)  (primary encounter diagnosis)  (I73.9) Peripheral vascular disease (H24)  Comment: mild hypervolemia, weight WNL, poor LE circulation  Plan:   -continue bumex 1mg  -staff to  check VS as scheduled  -continue econazole to bilateral feet  -elevate legs as possible  -lymph to wrap legs later today  -to wear compression stockings until legs are wrapped  -discontinue afrin, nasonex and albuterol HFA  -repeat BNP in 1-2 months    (N18.30) Stage 3 chronic kidney disease, unspecified whether stage 3a or 3b CKD (H)  Comment: creat 1.64, GFR 41  Plan: dose meds renally  -repeat BMP in 1-2 months    MED REC REQUIRED  Post Medication Reconciliation Status: medication reconcilation previously completed during another office visit        Electronically signed by: JITENDRA Freitas CNP          Sincerely,        JITENDRA Freitas CNP

## 2024-09-11 NOTE — PROGRESS NOTES
[facility] GERIATRICS    Chief Complaint   Patient presents with    RECHECK     HPI:  Luz Elena Sampson is a 83 year old  (1940), who is being seen today for an episodic care visit at: [facility] ([facility]) [[id number]]. Today's concern is:   1. Chronic congestive heart failure, unspecified heart failure type (H)    2. Peripheral vascular disease (H24)    3. Stage 3 chronic kidney disease, unspecified whether stage 3a or 3b CKD (H)      Patient seen for follow up, CHF with mild symptoms, BNP 4415, leg edema, lungs clear, slightly hypervolemic, legs thierry with edema 1-2+, also L pedal edema, crack on top of left foot between great and #2 scant bleeding, denies pain, also creat 1.64, GFR 41.    Allergies, and PMH/PSH reviewed in EPIC today.  REVIEW OF SYSTEMS:  4 point ROS including Respiratory, CV, GI and , other than that noted in the HPI,  is negative    Objective:   /66   Pulse 58   Temp 97.4  F (36.3  C)   Resp 18   Wt 113.4 kg (250 lb)   SpO2 94%   BMI 33.91 kg/m    GENERAL APPEARANCE:  in no distress, appears healthy  ENT:  Mouth and posterior oropharynx normal, moist mucous membranes  RESP:  lungs clear to auscultation , no respiratory distress  CV:  peripheral edema 1-2+ in lower legs, rate-normal  ABDOMEN:  bowel sounds normal  M/S:   Gait and station abnormal unsteady  SKIN:  Inspection of skin and subcutaneous tissue baseline  NEURO:   Examination of sensation by touch normal  PSYCH:  affect and mood normal    Labs done in SNF are in [facility]. Please refer to them using [employer]/Care Everywhere.    Assessment/Plan:  (I50.9) Chronic congestive heart failure, unspecified heart failure type (H)  (primary encounter diagnosis)  (I73.9) Peripheral vascular disease (H24)  Comment: mild hypervolemia, weight WNL, poor LE circulation  Plan:   -continue bumex 1mg  -staff to check VS as scheduled  -continue econazole to bilateral feet  -elevate legs as possible  -lymph to  wrap legs later today  -to wear compression stockings until legs are wrapped  -discontinue afrin, nasonex and albuterol HFA  -repeat BNP in 1-2 months    (N18.30) Stage 3 chronic kidney disease, unspecified whether stage 3a or 3b CKD (H)  Comment: creat 1.64, GFR 41  Plan: dose meds renally  -repeat BMP in 1-2 months    MED REC REQUIRED  Post Medication Reconciliation Status: medication reconcilation previously completed during another office visit        Electronically signed by: JITENDRA Freitas CNP

## 2024-10-15 VITALS
SYSTOLIC BLOOD PRESSURE: 132 MMHG | WEIGHT: 250 LBS | RESPIRATION RATE: 18 BRPM | HEART RATE: 58 BPM | OXYGEN SATURATION: 94 % | BODY MASS INDEX: 33.91 KG/M2 | DIASTOLIC BLOOD PRESSURE: 66 MMHG | TEMPERATURE: 97.4 F

## 2024-10-16 ENCOUNTER — ASSISTED LIVING VISIT (OUTPATIENT)
Dept: GERIATRICS | Facility: CLINIC | Age: 84
End: 2024-10-16
Payer: MEDICARE

## 2024-10-16 DIAGNOSIS — N18.30 STAGE 3 CHRONIC KIDNEY DISEASE, UNSPECIFIED WHETHER STAGE 3A OR 3B CKD (H): ICD-10-CM

## 2024-10-16 DIAGNOSIS — I73.9 PERIPHERAL VASCULAR DISEASE (H): ICD-10-CM

## 2024-10-16 DIAGNOSIS — I50.9 CHRONIC CONGESTIVE HEART FAILURE, UNSPECIFIED HEART FAILURE TYPE (H): Primary | ICD-10-CM

## 2024-10-16 PROCEDURE — 99349 HOME/RES VST EST MOD MDM 40: CPT | Performed by: NURSE PRACTITIONER

## 2024-10-16 NOTE — PROGRESS NOTES
[facility] GERIATRICS    Chief Complaint   Patient presents with    RECHECK     HPI:  Luz Elena Sampson is a 84 year old  (1940), who is being seen today for an episodic care visit at: [facility] ([facility]) [[id number]]. Today's concern is:   1. Chronic congestive heart failure, unspecified heart failure type (H)    2. Peripheral vascular disease (H)    3. Stage 3 chronic kidney disease, unspecified whether stage 3a or 3b CKD (H)      Patient seen for follow up, vitals WNL, mild hypervolemia leg and pedal edema 2-3+, wraps in place, no SOB, poor circulation, wraps plus elevation plus bumex fairly effective, skin intact, recent creat 1.64, GFR 41, overall appears healthy.    Allergies, and PMH/PSH reviewed in EPIC today.  REVIEW OF SYSTEMS:  4 point ROS including Respiratory, CV, GI and , other than that noted in the HPI,  is negative    Objective:   /66   Pulse 58   Temp 97.4  F (36.3  C)   Resp 18   Wt 113.4 kg (250 lb)   SpO2 94%   BMI 33.91 kg/m    GENERAL APPEARANCE:  in no distress, appears healthy  ENT:  Mouth and posterior oropharynx normal, moist mucous membranes  RESP:  lungs clear to auscultation , no respiratory distress  CV:  peripheral edema 2-3+ in LE's, rate-normal  ABDOMEN:  bowel sounds normal  M/S:   Gait and station abnormal unsteady  SKIN:  Inspection of skin and subcutaneous tissue baseline  NEURO:   Examination of sensation by touch normal  PSYCH:  affect and mood normal    Labs done in SNF are in [facility]. Please refer to them using [employer]/Care Everywhere.    Assessment/Plan:  (I50.9) Chronic congestive heart failure, unspecified heart failure type (H)  (primary encounter diagnosis)  (I73.9) Peripheral vascular disease (H)  Comment: mild hypervolemia, BNP 4415, poor circulation with edema  Plan:   -BMP, BNP on 11/12  -continue juxtalite wraps on 24/7  -when wraps are off use compression stockings  -elevate legs as possible  -continue bumex  daily    (N18.30) Stage 3 chronic kidney disease, unspecified whether stage 3a or 3b CKD (H)  Comment: creat 1.64, GFR 41  Plan: dose meds renally  -continue bumex for CHF  -repeat BMP 11/12    MED REC REQUIRED  Post Medication Reconciliation Status: medication reconcilation previously completed during another office visit      Electronically signed by: JITENDRA Freitas CNP

## 2024-10-16 NOTE — LETTER
10/16/2024      Luz Elena Sampson  Attn Parmjit Sampson  [address]  [place] [address]        [facility] GERIATRICS    Chief Complaint   Patient presents with     RECHECK     HPI:  Luz Elena Sampson is a 84 year old  (1940), who is being seen today for an episodic care visit at: [facility] ([facility]) [[id number]]. Today's concern is:   1. Chronic congestive heart failure, unspecified heart failure type (H)    2. Peripheral vascular disease (H)    3. Stage 3 chronic kidney disease, unspecified whether stage 3a or 3b CKD (H)      Patient seen for follow up, vitals WNL, mild hypervolemia leg and pedal edema 2-3+, wraps in place, no SOB, poor circulation, wraps plus elevation plus bumex fairly effective, skin intact, recent creat 1.64, GFR 41, overall appears healthy.    Allergies, and PMH/PSH reviewed in EPIC today.  REVIEW OF SYSTEMS:  4 point ROS including Respiratory, CV, GI and , other than that noted in the HPI,  is negative    Objective:   /66   Pulse 58   Temp 97.4  F (36.3  C)   Resp 18   Wt 113.4 kg (250 lb)   SpO2 94%   BMI 33.91 kg/m    GENERAL APPEARANCE:  in no distress, appears healthy  ENT:  Mouth and posterior oropharynx normal, moist mucous membranes  RESP:  lungs clear to auscultation , no respiratory distress  CV:  peripheral edema 2-3+ in LE's, rate-normal  ABDOMEN:  bowel sounds normal  M/S:   Gait and station abnormal unsteady  SKIN:  Inspection of skin and subcutaneous tissue baseline  NEURO:   Examination of sensation by touch normal  PSYCH:  affect and mood normal    Labs done in SNF are in [place] EPIC. Please refer to them using [employer]/Care Everywhere.    Assessment/Plan:  (I50.9) Chronic congestive heart failure, unspecified heart failure type (H)  (primary encounter diagnosis)  (I73.9) Peripheral vascular disease (H)  Comment: mild hypervolemia, BNP 4415, poor circulation with edema  Plan:   -BMP, BNP on 11/12  -continue juxtalite wraps on  24/7  -when wraps are off use compression stockings  -elevate legs as possible  -continue bumex daily    (N18.30) Stage 3 chronic kidney disease, unspecified whether stage 3a or 3b CKD (H)  Comment: creat 1.64, GFR 41  Plan: dose meds renally  -continue bumex for CHF  -repeat BMP 11/12    MED REC REQUIRED  Post Medication Reconciliation Status: medication reconcilation previously completed during another office visit      Electronically signed by: JITENDRA Freitas CNP          Sincerely,        JITENDRA Freitas CNP

## 2024-10-29 VITALS
TEMPERATURE: 97.4 F | HEART RATE: 58 BPM | DIASTOLIC BLOOD PRESSURE: 66 MMHG | WEIGHT: 250 LBS | BODY MASS INDEX: 33.91 KG/M2 | RESPIRATION RATE: 18 BRPM | SYSTOLIC BLOOD PRESSURE: 132 MMHG | OXYGEN SATURATION: 94 %

## 2024-10-30 ENCOUNTER — ASSISTED LIVING VISIT (OUTPATIENT)
Dept: GERIATRICS | Facility: CLINIC | Age: 84
End: 2024-10-30
Payer: MEDICARE

## 2024-10-30 DIAGNOSIS — I10 HYPERTENSION, UNSPECIFIED TYPE: ICD-10-CM

## 2024-10-30 DIAGNOSIS — I50.9 CHRONIC CONGESTIVE HEART FAILURE, UNSPECIFIED HEART FAILURE TYPE (H): Primary | ICD-10-CM

## 2024-10-30 DIAGNOSIS — E03.9 HYPOTHYROIDISM, UNSPECIFIED TYPE: ICD-10-CM

## 2024-10-30 PROCEDURE — 99349 HOME/RES VST EST MOD MDM 40: CPT | Performed by: NURSE PRACTITIONER

## 2024-10-30 NOTE — LETTER
10/30/2024      Luz Elena Sampson  Attn Parmjit Sampson  [address]  [place] [address]        [facility] GERIATR[facility]    Chief Complaint   Patient presents with     RECHECK     HPI:  Luz Elena Sampson is a 84 year old  (1940), who is being seen today for an episodic care visit at: [facility] (S) [[id number]]. Today's concern is:   1. Chronic congestive heart failure, unspecified heart failure type (H)    2. Hypertension, unspecified type    3. Hypothyroidism, unspecified type      Patient seen for follow up for increased fatigue, 108/57, covid/A&B test negative, today appears healthy, eating breakfast, lungs clear, no congestion, recent TSH 7.95, SBP's soft again at 111/57, HR 66, reports feeling better, no new concerns.    Allergies, and PMH/PSH reviewed in EPIC today.  REVIEW OF SYSTEMS:  4 point ROS including Respiratory, CV, GI and , other than that noted in the HPI,  is negative    Objective:   /66   Pulse 58   Temp 97.4  F (36.3  C)   Resp 18   Wt 113.4 kg (250 lb)   SpO2 94%   BMI 33.91 kg/m    GENERAL APPEARANCE:  in no distress, appears healthy  ENT:  Mouth and posterior oropharynx normal, moist mucous membranes  RESP:  lungs clear to auscultation , no respiratory distress  CV:  peripheral edema moderate+ in lower legs, rate-normal  ABDOMEN:  bowel sounds normal  M/S:   Gait and station abnormal using walker  SKIN:  Inspection of skin and subcutaneous tissue baseline  NEURO:   Examination of sensation by touch normal  PSYCH:  affect and mood normal    Labs done in SNF are in [facility]. Please refer to them using [employer]/Care Everywhere.    Assessment/Plan:  (I50.9) Chronic congestive heart failure, unspecified heart failure type (H)  (primary encounter diagnosis)  (I10) Hypertension, unspecified type  Comment: mildly hypervolemic, soft SBP's, edema+  Plan:   -continue juxtalite leg wraps  -elevate as possible  -continue bumex 1mg  -check vitals as  scheduled  -BMP, BNP on 11/12    (E03.9) Hypothyroidism, unspecified type  Comment: recent TSH 7.95  Plan: repeat TSH on 11/12  -continue levo 125mcg  -post lab goal range 3-9    MED REC REQUIRED  Post Medication Reconciliation Status: medication reconcilation previously completed during another office visit      Electronically signed by: JITENDRA Freitas CNP          Sincerely,        JITENDRA Freitas CNP

## 2024-10-30 NOTE — PROGRESS NOTES
[facility] GERIATRICS    Chief Complaint   Patient presents with    RECHECK     HPI:  Luz Elena Sampson is a 84 year old  (1940), who is being seen today for an episodic care visit at: [facility] ([facility]) [[id number]]. Today's concern is:   1. Chronic congestive heart failure, unspecified heart failure type (H)    2. Hypertension, unspecified type    3. Hypothyroidism, unspecified type      Patient seen for follow up for increased fatigue, 108/57, covid/A&B test negative, today appears healthy, eating breakfast, lungs clear, no congestion, recent TSH 7.95, SBP's soft again at 111/57, HR 66, reports feeling better, no new concerns.    Allergies, and PMH/PSH reviewed in EPIC today.  REVIEW OF SYSTEMS:  4 point ROS including Respiratory, CV, GI and , other than that noted in the HPI,  is negative    Objective:   /66   Pulse 58   Temp 97.4  F (36.3  C)   Resp 18   Wt 113.4 kg (250 lb)   SpO2 94%   BMI 33.91 kg/m    GENERAL APPEARANCE:  in no distress, appears healthy  ENT:  Mouth and posterior oropharynx normal, moist mucous membranes  RESP:  lungs clear to auscultation , no respiratory distress  CV:  peripheral edema moderate+ in lower legs, rate-normal  ABDOMEN:  bowel sounds normal  M/S:   Gait and station abnormal using walker  SKIN:  Inspection of skin and subcutaneous tissue baseline  NEURO:   Examination of sensation by touch normal  PSYCH:  affect and mood normal    Labs done in SNF are in [facility]. Please refer to them using [facility]/Care Everywhere.    Assessment/Plan:  (I50.9) Chronic congestive heart failure, unspecified heart failure type (H)  (primary encounter diagnosis)  (I10) Hypertension, unspecified type  Comment: mildly hypervolemic, soft SBP's, edema+  Plan:   -continue juxtalite leg wraps  -elevate as possible  -continue bumex 1mg  -check vitals as scheduled  -BMP, BNP on 11/12    (E03.9) Hypothyroidism, unspecified type  Comment: recent TSH  7.95  Plan: repeat TSH on 11/12  -continue levo 125mcg  -post lab goal range 3-9    MED REC REQUIRED  Post Medication Reconciliation Status: medication reconcilation previously completed during another office visit      Electronically signed by: JITENDRA Freitas CNP

## 2024-11-07 ENCOUNTER — LAB REQUISITION (OUTPATIENT)
Dept: LAB | Facility: CLINIC | Age: 84
End: 2024-11-07
Payer: COMMERCIAL

## 2024-11-07 DIAGNOSIS — N18.9 CHRONIC KIDNEY DISEASE, UNSPECIFIED: ICD-10-CM

## 2024-11-07 DIAGNOSIS — E03.9 HYPOTHYROIDISM, UNSPECIFIED: ICD-10-CM

## 2024-11-07 DIAGNOSIS — I50.9 HEART FAILURE, UNSPECIFIED (H): ICD-10-CM

## 2024-11-12 VITALS
RESPIRATION RATE: 18 BRPM | DIASTOLIC BLOOD PRESSURE: 66 MMHG | WEIGHT: 250 LBS | HEART RATE: 58 BPM | OXYGEN SATURATION: 94 % | SYSTOLIC BLOOD PRESSURE: 132 MMHG | TEMPERATURE: 97.4 F | BODY MASS INDEX: 33.91 KG/M2

## 2024-11-12 LAB
ANION GAP SERPL CALCULATED.3IONS-SCNC: 12 MMOL/L (ref 7–15)
BUN SERPL-MCNC: 15.1 MG/DL (ref 8–23)
CALCIUM SERPL-MCNC: 9.1 MG/DL (ref 8.8–10.4)
CHLORIDE SERPL-SCNC: 97 MMOL/L (ref 98–107)
CREAT SERPL-MCNC: 1.34 MG/DL (ref 0.67–1.17)
EGFRCR SERPLBLD CKD-EPI 2021: 52 ML/MIN/1.73M2
GLUCOSE SERPL-MCNC: 94 MG/DL (ref 70–99)
HCO3 SERPL-SCNC: 21 MMOL/L (ref 22–29)
NT-PROBNP SERPL-MCNC: 3340 PG/ML (ref 0–1800)
POTASSIUM SERPL-SCNC: 4.4 MMOL/L (ref 3.4–5.3)
SODIUM SERPL-SCNC: 130 MMOL/L (ref 135–145)
TSH SERPL DL<=0.005 MIU/L-ACNC: 4.42 UIU/ML (ref 0.3–4.2)

## 2024-11-13 ENCOUNTER — ASSISTED LIVING VISIT (OUTPATIENT)
Dept: GERIATRICS | Facility: CLINIC | Age: 84
End: 2024-11-13
Payer: MEDICARE

## 2024-11-13 DIAGNOSIS — T78.40XA ALLERGIC REACTION, INITIAL ENCOUNTER: ICD-10-CM

## 2024-11-13 DIAGNOSIS — N18.30 STAGE 3 CHRONIC KIDNEY DISEASE, UNSPECIFIED WHETHER STAGE 3A OR 3B CKD (H): ICD-10-CM

## 2024-11-13 DIAGNOSIS — I50.9 CHRONIC CONGESTIVE HEART FAILURE, UNSPECIFIED HEART FAILURE TYPE (H): Primary | ICD-10-CM

## 2024-11-13 PROCEDURE — 99349 HOME/RES VST EST MOD MDM 40: CPT | Performed by: NURSE PRACTITIONER

## 2024-11-13 RX ORDER — PREDNISONE 10 MG/1
30 TABLET ORAL DAILY
Status: SHIPPED
Start: 2024-11-13 | End: 2024-11-18

## 2024-11-13 NOTE — PROGRESS NOTES
[facility] GERIATRICS    Chief Complaint   Patient presents with    RECHECK     HPI:  Luz Elena Sampson is a 84 year old  (1940), who is being seen today for an episodic care visit at: [facility] (S) [[id number]]. Today's concern is:   1. Chronic congestive heart failure, unspecified heart failure type (H)    2. Stage 3 chronic kidney disease, unspecified whether stage 3a or 3b CKD (H)    3. Allergic reaction, initial encounter      Patient seen for follow up, no overt s/s CHF exacerbation, 2+ PACO, SBP's in the 110 range, on bumex, labs BNP 3340, creat 1.34, overall doing well, had flu/covid immunizations last week and now having idiopathic edema L thumb, pain L wrist and pain L distal calf area with new onset from infections, afebrile, appears healthy.    Allergies, and PMH/PSH reviewed in EPIC today.  REVIEW OF SYSTEMS:  4 point ROS including Respiratory, CV, GI and , other than that noted in the HPI,  is negative    Objective:   /66   Pulse 58   Temp 97.4  F (36.3  C)   Resp 18   Wt 113.4 kg (250 lb)   SpO2 94%   BMI 33.91 kg/m    GENERAL APPEARANCE:  in no distress, appears healthy  ENT:  Mouth and posterior oropharynx normal, moist mucous membranes  RESP:  lungs clear to auscultation , no respiratory distress  CV:  peripheral edema 2+ in lower legs, rate-normal  ABDOMEN:  bowel sounds normal  M/S:   Gait and station abnormal unsteady  SKIN:  Inspection of skin and subcutaneous tissue baseline  NEURO:   Examination of sensation by touch normal  PSYCH:  affect and mood normal    Labs done in SNF are in [place] EPIC. Please refer to them using [employer]/Care Everywhere.    Assessment/Plan:  (I50.9) Chronic congestive heart failure, unspecified heart failure type (H)  (primary encounter diagnosis)  (N18.30) Stage 3 chronic kidney disease, unspecified whether stage 3a or 3b CKD (H)  Comment: no s/s CHF exacerbation, creat 1.34  Plan:   -continue bumex 1mg, xarelto,  statin  -dose meds renally  -weekly weights  -maintain compression legs 24/7 as will tolerate  -repeat BNP, BMP in 3-6 months    (T78.40XA) Allergic reaction, initial encounter  Comment: new onset pain/edema post covid/flu injections  Plan:   -start prednisone 30mg daily x5 days  -instructed to inform nursing if has not improved    MED REC REQUIRED  Post Medication Reconciliation Status: medication reconcilation previously completed during another office visit        Electronically signed by: JITENDRA Freitas CNP

## 2024-11-13 NOTE — LETTER
11/13/2024      Luz Elena Sampson  Attyasmany Sampson  [address]  [place] [address]        No notes on file      Sincerely,        JITENDRA Freitas CNP

## 2024-12-04 ENCOUNTER — ASSISTED LIVING VISIT (OUTPATIENT)
Dept: GERIATRICS | Facility: CLINIC | Age: 84
End: 2024-12-04
Payer: MEDICARE

## 2024-12-04 VITALS
BODY MASS INDEX: 34.99 KG/M2 | DIASTOLIC BLOOD PRESSURE: 66 MMHG | RESPIRATION RATE: 18 BRPM | OXYGEN SATURATION: 94 % | TEMPERATURE: 97.4 F | WEIGHT: 258 LBS | SYSTOLIC BLOOD PRESSURE: 132 MMHG | HEART RATE: 58 BPM

## 2024-12-04 DIAGNOSIS — D69.6 THROMBOCYTOPENIA, UNSPECIFIED (H): ICD-10-CM

## 2024-12-04 DIAGNOSIS — G20.A1 PARKINSON'S DISEASE WITHOUT DYSKINESIA OR FLUCTUATING MANIFESTATIONS (H): Primary | ICD-10-CM

## 2024-12-04 DIAGNOSIS — I50.9 CHRONIC CONGESTIVE HEART FAILURE, UNSPECIFIED HEART FAILURE TYPE (H): ICD-10-CM

## 2024-12-04 PROCEDURE — 99349 HOME/RES VST EST MOD MDM 40: CPT | Performed by: NURSE PRACTITIONER

## 2024-12-04 NOTE — PROGRESS NOTES
[facility] GERIATRICS    Chief Complaint   Patient presents with    RECHECK     HPI:  Luz Elena Sampson is a 84 year old  (1940), who is being seen today for an episodic care visit at: [facility] (S) [[id number]]. Today's concern is:   1. Parkinson's disease without dyskinesia or fluctuating manifestations (H)    2. Thrombocytopenia, unspecified (H)    3. Chronic congestive heart failure, unspecified heart failure type (H)      Patient seen for follow up, Dx parkinsons with no noted tremor nor dysphagia, mild cognitive limitations, OK verbal and intake, unsteady gait, also recent platelets 138 with no s/s bleed nor bruise, moderate CHF with BNP down to 3340, edema legs 2+, mild SOB only with activity, overall appears healthy.    Allergies, and PMH/PSH reviewed in EPIC today.  REVIEW OF SYSTEMS:  4 point ROS including Respiratory, CV, GI and , other than that noted in the HPI,  is negative    Objective:   /66   Pulse 58   Temp 97.4  F (36.3  C)   Resp 18   Wt 117 kg (258 lb)   SpO2 94%   BMI 34.99 kg/m    GENERAL APPEARANCE:  in no distress, appears healthy  ENT:  Mouth and posterior oropharynx normal, moist mucous membranes  RESP:  no respiratory distress, diminished breath sounds bases  CV:  peripheral edema 2+ in lower legs, rate-normal  ABDOMEN:  bowel sounds normal  M/S:   Gait and station abnormal unsteady  SKIN:  Inspection of skin and subcutaneous tissue baseline  NEURO:   Examination of sensation by touch normal  PSYCH:  affect and mood normal    Labs done in SNF are in [facility]. Please refer to them using [facility]/Care Everywhere.    Assessment/Plan:  (G20.A1) Parkinson's disease without dyskinesia or fluctuating manifestations (H)  (primary encounter diagnosis)  Comment: no tremor, cognitive fairly intact  Plan: continue sinemet for now  -follow up neuro PRN  -consider weaning sinemet    (D69.6) Thrombocytopenia, unspecified (H)  Comment: recent  platelets 138  Plan: monitor for s/s bleeding  -continue xarelto for now    (I50.9) Chronic congestive heart failure, unspecified heart failure type (H)  Comment: BNP down to 3340, mild hypervolemia  Plan:   -continue statin, bumex  -repeat BMP, BNP in 3-6 months    MED REC REQUIRED  Post Medication Reconciliation Status: medication reconcilation previously completed during another office visit        Electronically signed by: JITENDRA Freitas CNP

## 2024-12-04 NOTE — LETTER
12/4/2024      Luz Elena Sampson  Attn Parmjit Sampson  [address]  [place] [address]        [facility] GERIATRICS    Chief Complaint   Patient presents with     RECHECK     HPI:  Luz Elena Sampson is a 84 year old  (1940), who is being seen today for an episodic care visit at: [facility] ([facility]) [[id number]]. Today's concern is:   1. Parkinson's disease without dyskinesia or fluctuating manifestations (H)    2. Thrombocytopenia, unspecified (H)    3. Chronic congestive heart failure, unspecified heart failure type (H)      Patient seen for follow up, Dx parkinsons with no noted tremor nor dysphagia, mild cognitive limitations, OK verbal and intake, unsteady gait, also recent platelets 138 with no s/s bleed nor bruise, moderate CHF with BNP down to 3340, edema legs 2+, mild SOB only with activity, overall appears healthy.    Allergies, and PMH/PSH reviewed in EPIC today.  REVIEW OF SYSTEMS:  4 point ROS including Respiratory, CV, GI and , other than that noted in the HPI,  is negative    Objective:   /66   Pulse 58   Temp 97.4  F (36.3  C)   Resp 18   Wt 117 kg (258 lb)   SpO2 94%   BMI 34.99 kg/m    GENERAL APPEARANCE:  in no distress, appears healthy  ENT:  Mouth and posterior oropharynx normal, moist mucous membranes  RESP:  no respiratory distress, diminished breath sounds bases  CV:  peripheral edema 2+ in lower legs, rate-normal  ABDOMEN:  bowel sounds normal  M/S:   Gait and station abnormal unsteady  SKIN:  Inspection of skin and subcutaneous tissue baseline  NEURO:   Examination of sensation by touch normal  PSYCH:  affect and mood normal    Labs done in SNF are in [facility]. Please refer to them using [facility]Care Everywhere.    Assessment/Plan:  (G20.A1) Parkinson's disease without dyskinesia or fluctuating manifestations (H)  (primary encounter diagnosis)  Comment: no tremor, cognitive fairly intact  Plan: continue sinemet for now  -follow up neuro  PRN  -consider weaning sinemet    (D69.6) Thrombocytopenia, unspecified (H)  Comment: recent platelets 138  Plan: monitor for s/s bleeding  -continue xarelto for now    (I50.9) Chronic congestive heart failure, unspecified heart failure type (H)  Comment: BNP down to 3340, mild hypervolemia  Plan:   -continue statin, bumex  -repeat BMP, BNP in 3-6 months    MED REC REQUIRED  Post Medication Reconciliation Status: medication reconcilation previously completed during another office visit        Electronically signed by: JITENDRA Freitas CNP          Sincerely,        JITENDRA Freitas CNP

## 2024-12-10 ENCOUNTER — OFFICE VISIT (OUTPATIENT)
Dept: CARDIOLOGY | Facility: CLINIC | Age: 84
End: 2024-12-10
Payer: MEDICARE

## 2024-12-10 ENCOUNTER — TELEPHONE (OUTPATIENT)
Dept: CARDIOLOGY | Facility: CLINIC | Age: 84
End: 2024-12-10

## 2024-12-10 VITALS
OXYGEN SATURATION: 98 % | DIASTOLIC BLOOD PRESSURE: 89 MMHG | HEART RATE: 53 BPM | WEIGHT: 265 LBS | SYSTOLIC BLOOD PRESSURE: 171 MMHG | HEIGHT: 71 IN | BODY MASS INDEX: 37.1 KG/M2

## 2024-12-10 DIAGNOSIS — I35.0 SEVERE AORTIC VALVE STENOSIS: ICD-10-CM

## 2024-12-10 DIAGNOSIS — I48.19 ATRIAL FIBRILLATION, PERSISTENT (H): Primary | ICD-10-CM

## 2024-12-10 DIAGNOSIS — I10 HYPERTENSION, UNSPECIFIED TYPE: ICD-10-CM

## 2024-12-10 DIAGNOSIS — Z95.2 STATUS POST TRANSCATHETER AORTIC VALVE REPLACEMENT: ICD-10-CM

## 2024-12-10 RX ORDER — LISINOPRIL 5 MG/1
5 TABLET ORAL DAILY
Qty: 90 TABLET | Refills: 3 | Status: SHIPPED | OUTPATIENT
Start: 2024-12-10

## 2024-12-10 NOTE — PATIENT INSTRUCTIONS
Thank you for coming to the [facility] @ [facility]; please note the following instructions:    1. Echocardiogram and follow up in April   2. 7 days zio in the spring - will mail to you  3. START lisinopril 5 mg daily for high blood pressure - okay to have pcp follow        If you have any questions regarding your visit please contact your care team:     Cardiology  Telephone Number   Izzy BAKER., RN  Phoebe GIBBS, RN  Linda BOWDEN, RN  Sera MADRIGAL, ELLEN ONOFRE, ELLEN DANIEL, Visit Facilitator  Breanne MARIE [facility] [phone] (option 1)   For scheduling appts:     [phone] (select option 1)       For the Device Clinic (Pacemakers and ICD's)  RN's :  Antonella Beltre   During business hours: [phone]    *After business hours:  [phone] (select option 4)      Normal test result notifications will be released via [employer] or mailed within 7 business days.  All other test results, will be communicated via telephone once reviewed by your cardiologist.    If you need a medication refill please contact your pharmacy.  Please allow 3 business days for your refill to be completed.    As always, thank you for trusting us with your health care needs!

## 2024-12-10 NOTE — TELEPHONE ENCOUNTER
Zhanna and Izzy thought PT would be a better fit with Dr. Llamas since his zio showed afib. Called patient and his son to see if they wanted to see Dr. Llamas instead patient was agreeable

## 2024-12-10 NOTE — LETTER
12/10/2024      RE: Luz Elena Sampson  Attn Parmjit Sampson  [address]  [place] [address]       Dear Colleague,    Thank you for the opportunity to participate in the care of your patient, Luz Elena Sampson, at the [facility] at [facility]. Please see a copy of my visit note below.                                                                                                                                                                                              [facility]      REFERRING PROVIDER:      DATE OF VISIT:      REASON FOR VISIT:      HISTORY OF PRESENT ILLNESS:   Mr. Luz Elena Sampson is a 84 year old  male with past medical history significant for      Medications, personal, family, and social history reviewed.    PAST MEDICAL HISTORY:  Past Medical History:   Diagnosis Date     Atrial fibrillation (H)      Chronic kidney disease      GERD (gastroesophageal reflux disease)      Hyperlipidemia      Hypertension      Hypothyroid      Psoriasis      Syncope 1/29/2023       PAST SURGICAL HISTORY:  Past Surgical History:   Procedure Laterality Date     ARTHRODESIS ANKLE  06/08/2011    Procedure:ARTHRODESIS ANKLE; Subtalar and Tibiotalar Fusion     ; Surgeon:FLAQUITO WARNER; [facility]     ARTHROPLASTY HIP      right     CATARACT EXTRACTION Bilateral     [place] Dr. Schaffer     CATARACT IOL, RT/LT       CHOLECYSTECTOMY       CV TRANSCATHETER AORTIC VALVE REPLACEMENT TRANSAORTIC APPROACH N/A      REMOVE HARDWARE ANKLE  07/10/2012    Procedure: REMOVE HARDWARE ANKLE;  Right Heel Hardware Removal with irrigation  ;  Surgeon: Flaquito Warner MD;  [facility]       CURRENT MEDICATIONS:  Current Outpatient Medications   Medication Sig Dispense Refill     ACE/ARB/ARNI NOT PRESCRIBED (INTENTIONAL) Please choose reason not prescribed from choices below.       bumetanide (BUMEX) 1 MG tablet Take 1 tablet (1  mg) by mouth daily       carbidopa-levodopa (SINEMET)  MG tablet Take 1 tablet by mouth nightly as needed (RLS). 1 tablet 0     clobetasol (TEMOVATE) 0.05 % external solution Apply topically 2 times daily       econazole nitrate 1 % external cream Apply topically daily Apply 2gm to left foot BID       guaiFENesin (MUCINEX) 600 MG 12 hr tablet TAKE ONE TABLET BY MOUTH TWICE DAILY FOR COUGH *VIALS* 60 tablet 11     levothyroxine (SYNTHROID/LEVOTHROID) 125 MCG tablet Take 1 tablet (125 mcg) by mouth daily       ORDER FOR DME Wheelchair; Right elevating leg rest  [facility]  Phone: [phone]  Diagnosis: Gait Instability related to Right Ankle Fusion 1 Device 0     pantoprazole (PROTONIX) 20 MG EC tablet Take 2 tablets (40 mg) by mouth 2 times daily.       simvastatin (ZOCOR) 20 MG tablet TAKE ONE TABLET BY MOUTH ONCE DAILY *VIALS* 30 tablet 11     tamsulosin (FLOMAX) 0.4 MG capsule Take 1 capsule (0.4 mg) by mouth every evening 90 capsule 3     XARELTO ANTICOAGULANT 15 MG TABS tablet TAKE ONE TABLET BY MOUTH EVERY EVENING WITH A MEAL*VIALS* 30 tablet 11       ALLERGIES:  Allergies   Allergen Reactions     Warfarin Itching     Alopecia     Nsaids Nephrotoxicity     Patient avoids due to kidney function       FAMILY HISTORY:  Family History   Problem Relation Age of Onset     Cataracts Mother      Dementia Mother      Diabetes Mother      Alzheimer Disease Mother      Hypertension Father      Heart Disease Father      Kidney Disease Father      Diabetes Maternal Grandmother      Diabetes Maternal Grandfather      Spina bifida Brother      Low Back Problems Daughter        SOCIAL HISTORY:  Social History     Tobacco Use     Smoking status: Former     Types: Cigarettes, Pipe, Cigars     Smokeless tobacco: Never   Substance Use Topics     Alcohol use: Yes     Comment: 2 beers per evening     Drug use: No       REVIEW OF SYSTEMS:  Patient denies significant limitation in terms of physical stamina and activity endurance.   "No chest pain, shortness of breath or difficulty in breathing.  No ankle swelling.  No orthopnea.  No subjective awareness of irregular heart rhythm or rapid heart pulsation.  No history of severe lightheadedness, syncope or near syncope.    Review of other systems otherwise unremarkable.    PHYSICAL EXAMINATION:  BP (!) 147/70 (BP Location: Left arm, Patient Position: Chair, Cuff Size: Adult Regular)   Pulse 53   Ht 1.803 m (5' 11\")   Wt 120.2 kg (265 lb)   SpO2 98%   BMI 36.96 kg/m    GENERAL APPEARANCE: alert and in no acute distress  HEENT: no icterus, no central cyanosis  LYMPH/NECK: no adenopathy, no asymmetry, no appreciable neck mass.  RESPIRATORY: lungs clear to auscultation - no rales, rhonchi or wheezes, no use of accessory muscles, no retractions, respirations are unlabored, normal respiratory rate  CARDIOVASCULAR: Auscultation reveals normal heart sounds.  There is a grade 1/6 systolic murmur at the apex.  There is no diastolic murmur.  GI: soft, non tender.  No hepatosplenomegaly.  EXTREMITIES: No significant ankle edema.  NEURO: alert, normal speech,and affect  SKIN: no ecchymoses, no rashes    I have reviewed the labs and imaging results below and made my comment in the assessment and plan.    DIAGNOSTIC DATA:  CBC RESULTS:   Lab Results   Component Value Date    WBC 5.4 07/16/2024    WBC 4.9 11/19/2012    RBC 4.26 (L) 07/16/2024    RBC 4.73 11/19/2012    HGB 12.1 (L) 07/16/2024    HGB 14.0 11/19/2012    HCT 36.4 (L) 07/16/2024    HCT 40.3 11/19/2012    MCV 85 07/16/2024    MCV 85 11/19/2012    MCH 28.4 07/16/2024    MCH 29.6 11/19/2012    MCHC 33.2 07/16/2024    MCHC 34.7 11/19/2012    RDW 14.2 07/16/2024    RDW 14.8 11/19/2012     (L) 07/16/2024     11/19/2012       BMP RESULTS:  Lab Results   Component Value Date     (L) 11/12/2024     11/19/2012    POTASSIUM 4.4 11/12/2024    POTASSIUM 4.8 11/19/2012    CHLORIDE 97 (L) 11/12/2024    CHLORIDE 108 11/19/2012    CO2 21 " (L) 11/12/2024    CO2 24 11/19/2012    ANIONGAP 12 11/12/2024    ANIONGAP 8 11/19/2012    GLC 94 11/12/2024    GLC 90 11/19/2012    BUN 15.1 11/12/2024    BUN 25 11/19/2012    CR 1.34 (H) 11/12/2024    CR 1.33 (H) 11/19/2012    GFRESTIMATED 52 (L) 11/12/2024    GFRESTIMATED 53 (L) 11/19/2012    GFRESTBLACK 64 11/19/2012    YANELI 9.1 11/12/2024    YANELI 8.8 11/19/2012        Electrocardiogram      Echocardiogram  No results found for this or any previous visit (from the past 8760 hours).      ASSESSMENT AND PLAN:   Testing 123.      Total time spent today for this visit is minutes including precharting, face-to-face clinic visit, review of labs/imaging and medical documentation.    Nicky Llamas MD, FACC, FAHA, FHRS, CCDS  Cardiologist    (This medical documentation was transcribed using voice recognition technology and therefore will be susceptible to unintentional transcribing errors and/or omissions)                                                                                                                                                                                                  [facility]      REFERRING PROVIDER:  Chandler Oconnell CNP    DATE OF VISIT:  December 10, 2024    REASON FOR VISIT:  Overdue cardiology clinic follow-up    HISTORY OF PRESENT ILLNESS:   Mr. Luz Elena Sampson is an 84 year old  male with past medical history significant for permanent atrial fibrillation with slow ventricular response, ischemic and valvular heart disease, peripheral arterial disease, hypertension, dyslipoproteinemia, stage III chronic kidney disease, obstructive sleep apnea, chronic venous insufficiency, hypothyroidism, and parkinsonism.    He initially underwent percutaneous coronary intervention at an outside institution on 9/27/2021 consisting of balloon angioplasty with stent placement to the proximal and mid LAD and balloon angioplasty of the diagonal branch vessel.  This was followed a  few days later by bioprosthetic TAVR (transcatheter aortic valve replacement) according to the patient's recollection.  When he was seen by his primary cardiologist Dr. Romero in clinic follow-up on 5/11/2023, he was noted to have remained stable from a hemodynamic standpoint and he was continued on the same medical regimen.  He was supposed to have returned for an annual follow-up in May 2024 but this was rescheduled due to unforeseen conflicts.  He returned to the cardiology clinic today accompanied by his son for an overdue annual follow-up.    He does endorse subtle progressive reduced physical stamina and activity endurance.  His mobility has been limited by his difficulties in maintaining balance related to his underlying Parkinson's disease.  He also has chronic bilateral lower extremity edema which has been maintained stable with the application of support stockings.    He otherwise denies interim significant symptoms referable to the cardiovascular system.  In particular, no interim history of chest pain, shortness of breath, or dyspnea on activities of daily living.  No interim history of worsening in his chronic bilateral lower extremity edema.  No interim history of orthopnea or paroxysmal nocturnal dyspnea.  No interim subjective awareness of irregular heart rhythm or rapid heart pulsation.  No interim history of severe lightheadedness, syncope or near syncope.  All in all, he reports that he has been doing well and has no particular complaints.    Medications, personal, family, and social history reviewed.    PAST MEDICAL HISTORY:  Past Medical History:   Diagnosis Date     Atrial fibrillation (H)      Chronic kidney disease      GERD (gastroesophageal reflux disease)      Hyperlipidemia      Hypertension      Hypothyroid      Psoriasis      Syncope 1/29/2023     PAST SURGICAL HISTORY:  Past Surgical History:   Procedure Laterality Date     ARTHRODESIS ANKLE  06/08/2011    Procedure:ARTHRODESIS ANKLE;  Subtalar and Tibiotalar Fusion     ; Surgeon:FLAQUITO WARNER; [facility]     ARTHROPLASTY HIP      right     CATARACT EXTRACTION Bilateral     [place] Dr. Schaffer     CATARACT IOL, RT/LT       CHOLECYSTECTOMY       CV TRANSCATHETER AORTIC VALVE REPLACEMENT TRANSAORTIC APPROACH N/A      REMOVE HARDWARE ANKLE  07/10/2012    Procedure: REMOVE HARDWARE ANKLE;  Right Heel Hardware Removal with irrigation  ;  Surgeon: Flaquito Warner MD;  [facility]     CURRENT MEDICATIONS:  Current Outpatient Medications   Medication Sig Dispense Refill     ACE/ARB/ARNI NOT PRESCRIBED (INTENTIONAL) Please choose reason not prescribed from choices below.       bumetanide (BUMEX) 1 MG tablet Take 1 tablet (1 mg) by mouth daily       carbidopa-levodopa (SINEMET)  MG tablet Take 1 tablet by mouth nightly as needed (RLS). 1 tablet 0     clobetasol (TEMOVATE) 0.05 % external solution Apply topically 2 times daily       econazole nitrate 1 % external cream Apply topically daily Apply 2gm to left foot BID       guaiFENesin (MUCINEX) 600 MG 12 hr tablet TAKE ONE TABLET BY MOUTH TWICE DAILY FOR COUGH *VIALS* 60 tablet 11     levothyroxine (SYNTHROID/LEVOTHROID) 125 MCG tablet Take 1 tablet (125 mcg) by mouth daily       ORDER FOR DME Wheelchair; Right elevating leg rest  [employer]  Phone: [phone]  Diagnosis: Gait Instability related to Right Ankle Fusion 1 Device 0     pantoprazole (PROTONIX) 20 MG EC tablet Take 2 tablets (40 mg) by mouth 2 times daily.       simvastatin (ZOCOR) 20 MG tablet TAKE ONE TABLET BY MOUTH ONCE DAILY *VIALS* 30 tablet 11     tamsulosin (FLOMAX) 0.4 MG capsule Take 1 capsule (0.4 mg) by mouth every evening 90 capsule 3     XARELTO ANTICOAGULANT 15 MG TABS tablet TAKE ONE TABLET BY MOUTH EVERY EVENING WITH A MEAL*VIALS* 30 tablet 11     ALLERGIES:  Allergies   Allergen Reactions     Warfarin Itching     Alopecia     Nsaids Nephrotoxicity     Patient avoids due to kidney function     FAMILY  "HISTORY:  Family History   Problem Relation Age of Onset     Cataracts Mother      Dementia Mother      Diabetes Mother      Alzheimer Disease Mother      Hypertension Father      Heart Disease Father      Kidney Disease Father      Diabetes Maternal Grandmother      Diabetes Maternal Grandfather      Spina bifida Brother      Low Back Problems Daughter      SOCIAL HISTORY:  Tobacco Use     Smoking status: Former     Types: Cigarettes, Pipe, Cigars     Smokeless tobacco: Never   Substance Use Topics     Alcohol use: Yes     Comment: 2 beers per evening     Drug use: No     REVIEW OF SYSTEMS:  Other than as stated in the history of present illness and all or past medical history, comprehensive review of other systems was performed and was unremarkable.    PHYSICAL EXAMINATION:  BP (!) 147/70 (BP Location: Left arm, Patient Position: Chair, Cuff Size: Adult Regular)   Pulse 53   Ht 1.803 m (5' 11\")   Wt 120.2 kg (265 lb)   SpO2 98%   BMI 36.96 kg/m    GENERAL APPEARANCE: alert and in no acute distress.  Comfortable sitting in wheelchair.  HEENT: no icterus, no central cyanosis  LYMPH/NECK: no adenopathy, no asymmetry, no appreciable neck mass.  RESPIRATORY: lungs clear to auscultation - no rales, rhonchi or wheezes, no use of accessory muscles, no retractions, respirations are unlabored, normal respiratory rate  CARDIOVASCULAR: Auscultation reveals normal heart sounds.  There is a grade 1/6 systolic murmur at the apex.  There is no diastolic murmur.  GI: soft, non tender.  No hepatosplenomegaly.  EXTREMITIES: Chronic bilateral lower extremity edema with support stockings.  NEURO: alert, normal speech,and affect.  Wheelchair-bound due to disequilibrium related to Parkinson's disease.  SKIN: no ecchymoses, no rashes    I have reviewed the labs and imaging results below and made my comment in the assessment and plan.    DIAGNOSTIC DATA:  CBC RESULTS:   Lab Results   Component Value Date    WBC 5.4 07/16/2024    WBC " 4.9 11/19/2012    RBC 4.26 (L) 07/16/2024    RBC 4.73 11/19/2012    HGB 12.1 (L) 07/16/2024    HGB 14.0 11/19/2012    HCT 36.4 (L) 07/16/2024    HCT 40.3 11/19/2012    MCV 85 07/16/2024    MCV 85 11/19/2012    MCH 28.4 07/16/2024    MCH 29.6 11/19/2012    MCHC 33.2 07/16/2024    MCHC 34.7 11/19/2012    RDW 14.2 07/16/2024    RDW 14.8 11/19/2012     (L) 07/16/2024     11/19/2012     BMP RESULTS:  Lab Results   Component Value Date     (L) 11/12/2024     11/19/2012    POTASSIUM 4.4 11/12/2024    POTASSIUM 4.8 11/19/2012    CHLORIDE 97 (L) 11/12/2024    CHLORIDE 108 11/19/2012    CO2 21 (L) 11/12/2024    CO2 24 11/19/2012    ANIONGAP 12 11/12/2024    ANIONGAP 8 11/19/2012    GLC 94 11/12/2024    GLC 90 11/19/2012    BUN 15.1 11/12/2024    BUN 25 11/19/2012    CR 1.34 (H) 11/12/2024    CR 1.33 (H) 11/19/2012    GFRESTIMATED 52 (L) 11/12/2024    GFRESTIMATED 53 (L) 11/19/2012    GFRESTBLACK 64 11/19/2012    YANELI 9.1 11/12/2024    YANELI 8.8 11/19/2012      Electrocardiogram  Twelve-lead electrocardiogram obtained in the clinic on 12/10/2024 documented underlying atrial fibrillation with slow ventricular response.  R-R intervals varied from between 840-1640 ms with average ventricular rate of 51 bpm.  There was nonspecific intraventricular conduction delay with a QRS duration of 116 ms.  QT and QTc intervals were measured at 444 and 409 ms respectively.  There was left axis deviation consistent with left anterior hemifascicular block.  There was old septal myocardial infarction.    Echocardiogram  Echocardiogram completed on 5/9/2023 was reported to show normal left ventricular systolic function with an estimated left ventricular ejection fraction of 60 to 65%.  Left ventricular size and wall thickness could not be evaluated.  There was normal right ventricular size and function.  There was normally functioning bioprosthetic aortic valve with a mean gradient of 6 mmHg.  There was no evidence of  paravalvular or valvular regurgitation.    ASSESSMENT AND PLAN:   Other than subtle slightly reduced physical stamina and activity endurance since his last cardiology clinic visit with Dr. Romero on 5/11/2023, he appears to have remained relatively stable from a hemodynamic standpoint with no interim significant symptoms referable to the cardiovascular system.  His beta-blocker apparently has been discontinued since his last cardiology clinic visit presumably because of his underlying permanent atrial fibrillation with slow ventricular response.  In view of persistent systolic blood pressure of between 147 and up to 170 mmHg during the clinic encounter, I took the liberty of starting him on lisinopril.  Lisinopril was chosen in view of his underlying diabetes mellitus with stage III chronic kidney disease in the absence of significant side effects on his electronic medical record and per patient's report.  At this point, it would be prudent to repeat an echocardiogram to document current status of his bioprosthetic aortic valve about 3 years following implantation.  Similarly, an extended period of Holter monitoring to document his ventricular rate to rule out clinically significant pauses would be reasonable.  At the conclusion of the clinic encounter, both the patient and his son appeared to have a reasonable understanding of our discussion and stated no questions regarding the rationale for the above stated management strategy.  He would like to proceed as recommended.  For patient's convenience since he requires transport by his son from his assisted living facility, echocardiogram will be scheduled to be performed in the spring 2025.  A few weeks prior to the echocardiography study, a 2-week of Zio patch Holter monitoring will be mailed to the patient to be applied to document his ventricular rate during different levels of physical activities.  He will return to the cardiology clinic for follow-up in about a  year, sooner if he should develop new symptoms and depending on findings on the echocardiogram and extended Holter monitoring using a Zio patch.  In addition, he will follow-up with his primary care provider with a BMP check in about a week following the initiation of 5 mg daily of lisinopril    Total time spent today for this visit is 40 minutes including precharting, face-to-face clinic visit, review of labs/imaging and medical documentation.    Nicky Llamas MD, FACC, FAHA, FHRS, CCDS  Cardiologist    (This medical documentation was transcribed using voice recognition technology and therefore will be susceptible to unintentional transcribing errors and/or omissions)        Please do not hesitate to contact me if you have any questions/concerns.     Sincerely,     Nicky Llamas MD

## 2024-12-10 NOTE — NURSING NOTE
"Chief Complaint   Patient presents with    Follow Up     Atrial fibrillation, persistent (H) [I48.19]  Hypertension, unspecified type [I10]  Bradycardia [R00.1]          Initial BP (!) 147/70 (BP Location: Left arm, Patient Position: Chair, Cuff Size: Adult Regular)   Pulse 53   Ht 1.803 m (5' 11\")   Wt 120.2 kg (265 lb)   SpO2 98%   BMI 36.96 kg/m   Estimated body mass index is 36.96 kg/m  as calculated from the following:    Height as of this encounter: 1.803 m (5' 11\").    Weight as of this encounter: 120.2 kg (265 lb)..  BP completed using cuff size: regular    Yi BARDALES CNA  "

## 2024-12-10 NOTE — PROGRESS NOTES
[facility]      REFERRING PROVIDER:      DATE OF VISIT:      REASON FOR VISIT:      HISTORY OF PRESENT ILLNESS:   Mr. Luz Elena Sampson is a 84 year old  male with past medical history significant for      Medications, personal, family, and social history reviewed.    PAST MEDICAL HISTORY:  Past Medical History:   Diagnosis Date    Atrial fibrillation (H)     Chronic kidney disease     GERD (gastroesophageal reflux disease)     Hyperlipidemia     Hypertension     Hypothyroid     Psoriasis     Syncope 1/29/2023       PAST SURGICAL HISTORY:  Past Surgical History:   Procedure Laterality Date    ARTHRODESIS ANKLE  06/08/2011    Procedure:ARTHRODESIS ANKLE; Subtalar and Tibiotalar Fusion     ; Surgeon:FLAQUITO WARNER; [facility]    ARTHROPLASTY HIP      right    CATARACT EXTRACTION Bilateral     [place] Dr. Schaffer    CATARACT IOL, RT/LT      CHOLECYSTECTOMY      CV TRANSCATHETER AORTIC VALVE REPLACEMENT TRANSAORTIC APPROACH N/A     REMOVE HARDWARE ANKLE  07/10/2012    Procedure: REMOVE HARDWARE ANKLE;  Right Heel Hardware Removal with irrigation  ;  Surgeon: Flaquito Warner MD;  [facility]       CURRENT MEDICATIONS:  Current Outpatient Medications   Medication Sig Dispense Refill    ACE/ARB/ARNI NOT PRESCRIBED (INTENTIONAL) Please choose reason not prescribed from choices below.      bumetanide (BUMEX) 1 MG tablet Take 1 tablet (1 mg) by mouth daily      carbidopa-levodopa (SINEMET)  MG tablet Take 1 tablet by mouth nightly as needed (RLS). 1 tablet 0    clobetasol (TEMOVATE) 0.05 % external solution Apply topically 2 times daily      econazole nitrate 1 % external cream Apply topically daily Apply 2gm to left foot BID      guaiFENesin (MUCINEX) 600 MG 12 hr tablet TAKE ONE TABLET BY MOUTH  TWICE DAILY FOR COUGH *VIALS* 60 tablet 11    levothyroxine (SYNTHROID/LEVOTHROID) 125 MCG tablet Take 1 tablet (125 mcg) by mouth daily      ORDER FOR DME Wheelchair; Right elevating leg rest  [employer]  Phone: [phone]  Diagnosis: Gait Instability related to Right Ankle Fusion 1 Device 0    pantoprazole (PROTONIX) 20 MG EC tablet Take 2 tablets (40 mg) by mouth 2 times daily.      simvastatin (ZOCOR) 20 MG tablet TAKE ONE TABLET BY MOUTH ONCE DAILY *VIALS* 30 tablet 11    tamsulosin (FLOMAX) 0.4 MG capsule Take 1 capsule (0.4 mg) by mouth every evening 90 capsule 3    XARELTO ANTICOAGULANT 15 MG TABS tablet TAKE ONE TABLET BY MOUTH EVERY EVENING WITH A MEAL*VIALS* 30 tablet 11       ALLERGIES:  Allergies   Allergen Reactions    Warfarin Itching     Alopecia    Nsaids Nephrotoxicity     Patient avoids due to kidney function       FAMILY HISTORY:  Family History   Problem Relation Age of Onset    Cataracts Mother     Dementia Mother     Diabetes Mother     Alzheimer Disease Mother     Hypertension Father     Heart Disease Father     Kidney Disease Father     Diabetes Maternal Grandmother     Diabetes Maternal Grandfather     Spina bifida Brother     Low Back Problems Daughter        SOCIAL HISTORY:  Social History     Tobacco Use    Smoking status: Former     Types: Cigarettes, Pipe, Cigars    Smokeless tobacco: Never   Substance Use Topics    Alcohol use: Yes     Comment: 2 beers per evening    Drug use: No       REVIEW OF SYSTEMS:  Patient denies significant limitation in terms of physical stamina and activity endurance.  No chest pain, shortness of breath or difficulty in breathing.  No ankle swelling.  No orthopnea.  No subjective awareness of irregular heart rhythm or rapid heart pulsation.  No history of severe lightheadedness, syncope or near syncope.    Review of other systems otherwise unremarkable.    PHYSICAL EXAMINATION:  BP (!) 147/70 (BP Location: Left arm, Patient Position: Chair, Cuff Size: Adult  "Regular)   Pulse 53   Ht 1.803 m (5' 11\")   Wt 120.2 kg (265 lb)   SpO2 98%   BMI 36.96 kg/m    GENERAL APPEARANCE: alert and in no acute distress  HEENT: no icterus, no central cyanosis  LYMPH/NECK: no adenopathy, no asymmetry, no appreciable neck mass.  RESPIRATORY: lungs clear to auscultation - no rales, rhonchi or wheezes, no use of accessory muscles, no retractions, respirations are unlabored, normal respiratory rate  CARDIOVASCULAR: Auscultation reveals normal heart sounds.  There is a grade 1/6 systolic murmur at the apex.  There is no diastolic murmur.  GI: soft, non tender.  No hepatosplenomegaly.  EXTREMITIES: No significant ankle edema.  NEURO: alert, normal speech,and affect  SKIN: no ecchymoses, no rashes    I have reviewed the labs and imaging results below and made my comment in the assessment and plan.    DIAGNOSTIC DATA:  CBC RESULTS:   Lab Results   Component Value Date    WBC 5.4 07/16/2024    WBC 4.9 11/19/2012    RBC 4.26 (L) 07/16/2024    RBC 4.73 11/19/2012    HGB 12.1 (L) 07/16/2024    HGB 14.0 11/19/2012    HCT 36.4 (L) 07/16/2024    HCT 40.3 11/19/2012    MCV 85 07/16/2024    MCV 85 11/19/2012    MCH 28.4 07/16/2024    MCH 29.6 11/19/2012    MCHC 33.2 07/16/2024    MCHC 34.7 11/19/2012    RDW 14.2 07/16/2024    RDW 14.8 11/19/2012     (L) 07/16/2024     11/19/2012       BMP RESULTS:  Lab Results   Component Value Date     (L) 11/12/2024     11/19/2012    POTASSIUM 4.4 11/12/2024    POTASSIUM 4.8 11/19/2012    CHLORIDE 97 (L) 11/12/2024    CHLORIDE 108 11/19/2012    CO2 21 (L) 11/12/2024    CO2 24 11/19/2012    ANIONGAP 12 11/12/2024    ANIONGAP 8 11/19/2012    GLC 94 11/12/2024    GLC 90 11/19/2012    BUN 15.1 11/12/2024    BUN 25 11/19/2012    CR 1.34 (H) 11/12/2024    CR 1.33 (H) 11/19/2012    GFRESTIMATED 52 (L) 11/12/2024    GFRESTIMATED 53 (L) 11/19/2012    GFRESTBLACK 64 11/19/2012    YANELI 9.1 11/12/2024    YANELI 8.8 11/19/2012    "     Electrocardiogram      Echocardiogram  No results found for this or any previous visit (from the past 8760 hours).      ASSESSMENT AND PLAN:   Testing 123.      Total time spent today for this visit is minutes including precharting, face-to-face clinic visit, review of labs/imaging and medical documentation.    Nicky Llamas MD, FACC, FAHA, FHRS, CCDS  Cardiologist    (This medical documentation was transcribed using voice recognition technology and therefore will be susceptible to unintentional transcribing errors and/or omissions)

## 2024-12-11 PROBLEM — E66.812 CLASS 2 SEVERE OBESITY DUE TO EXCESS CALORIES WITH SERIOUS COMORBIDITY IN ADULT (H): Status: ACTIVE | Noted: 2024-12-11

## 2024-12-11 PROBLEM — E66.01 CLASS 2 SEVERE OBESITY DUE TO EXCESS CALORIES WITH SERIOUS COMORBIDITY IN ADULT (H): Status: ACTIVE | Noted: 2024-12-11

## 2024-12-11 LAB
ATRIAL RATE - MUSE: 42 BPM
DIASTOLIC BLOOD PRESSURE - MUSE: NORMAL MMHG
INTERPRETATION ECG - MUSE: NORMAL
P AXIS - MUSE: NORMAL DEGREES
PR INTERVAL - MUSE: NORMAL MS
QRS DURATION - MUSE: 116 MS
QT - MUSE: 444 MS
QTC - MUSE: 409 MS
R AXIS - MUSE: -31 DEGREES
SYSTOLIC BLOOD PRESSURE - MUSE: NORMAL MMHG
T AXIS - MUSE: 47 DEGREES
VENTRICULAR RATE- MUSE: 51 BPM

## 2024-12-11 NOTE — PROGRESS NOTES
[facility]iology [facility]      REFERRING PROVIDER:  Chandler Oconnell CNP    DATE OF VISIT:  December 10, 2024    REASON FOR VISIT:  Overdue cardiology clinic follow-up    HISTORY OF PRESENT ILLNESS:   Mr. Luz Elena Sampson is an 84 year old  male with past medical history significant for permanent atrial fibrillation with slow ventricular response, ischemic and valvular heart disease, peripheral arterial disease, hypertension, dyslipoproteinemia, stage III chronic kidney disease, obstructive sleep apnea, chronic venous insufficiency, hypothyroidism, and parkinsonism.    He initially underwent percutaneous coronary intervention at an outside institution on 9/27/2021 consisting of balloon angioplasty with stent placement to the proximal and mid LAD and balloon angioplasty of the diagonal branch vessel.  This was followed a few days later by bioprosthetic TAVR (transcatheter aortic valve replacement) according to the patient's recollection.  When he was seen by his primary cardiologist Dr. Romero in clinic follow-up on 5/11/2023, he was noted to have remained stable from a hemodynamic standpoint and he was continued on the same medical regimen.  He was supposed to have returned for an annual follow-up in May 2024 but this was rescheduled due to unforeseen conflicts.  He returned to the cardiology clinic today accompanied by his son for an overdue annual follow-up.    He does endorse subtle progressive reduced physical stamina and activity endurance.  His mobility has been limited by his difficulties in maintaining balance related to his underlying Parkinson's disease.  He also has chronic bilateral lower extremity edema which has been maintained stable with the application of support stockings.    He otherwise denies interim  significant symptoms referable to the cardiovascular system.  In particular, no interim history of chest pain, shortness of breath, or dyspnea on activities of daily living.  No interim history of worsening in his chronic bilateral lower extremity edema.  No interim history of orthopnea or paroxysmal nocturnal dyspnea.  No interim subjective awareness of irregular heart rhythm or rapid heart pulsation.  No interim history of severe lightheadedness, syncope or near syncope.  All in all, he reports that he has been doing well and has no particular complaints.    Medications, personal, family, and social history reviewed.    PAST MEDICAL HISTORY:  Past Medical History:   Diagnosis Date    Atrial fibrillation (H)     Chronic kidney disease     GERD (gastroesophageal reflux disease)     Hyperlipidemia     Hypertension     Hypothyroid     Psoriasis     Syncope 1/29/2023     PAST SURGICAL HISTORY:  Past Surgical History:   Procedure Laterality Date    ARTHRODESIS ANKLE  06/08/2011    Procedure:ARTHRODESIS ANKLE; Subtalar and Tibiotalar Fusion     ; Surgeon:FLAQUITO WARNER; [facility]    ARTHROPLASTY HIP      right    CATARACT EXTRACTION Bilateral     [place] Dr. Schaffer    CATARACT IOL, RT/LT      CHOLECYSTECTOMY      CV TRANSCATHETER AORTIC VALVE REPLACEMENT TRANSAORTIC APPROACH N/A     REMOVE HARDWARE ANKLE  07/10/2012    Procedure: REMOVE HARDWARE ANKLE;  Right Heel Hardware Removal with irrigation  ;  Surgeon: Flaquito Warner MD;  [facility]     CURRENT MEDICATIONS:  Current Outpatient Medications   Medication Sig Dispense Refill    ACE/ARB/ARNI NOT PRESCRIBED (INTENTIONAL) Please choose reason not prescribed from choices below.      bumetanide (BUMEX) 1 MG tablet Take 1 tablet (1 mg) by mouth daily      carbidopa-levodopa (SINEMET)  MG tablet Take 1 tablet by mouth nightly as needed (RLS). 1 tablet 0    clobetasol (TEMOVATE) 0.05 % external solution Apply topically 2 times daily       "econazole nitrate 1 % external cream Apply topically daily Apply 2gm to left foot BID      guaiFENesin (MUCINEX) 600 MG 12 hr tablet TAKE ONE TABLET BY MOUTH TWICE DAILY FOR COUGH *VIALS* 60 tablet 11    levothyroxine (SYNTHROID/LEVOTHROID) 125 MCG tablet Take 1 tablet (125 mcg) by mouth daily      ORDER FOR DME Wheelchair; Right elevating leg rest  [employer]  Phone: [phone]  Diagnosis: Gait Instability related to Right Ankle Fusion 1 Device 0    pantoprazole (PROTONIX) 20 MG EC tablet Take 2 tablets (40 mg) by mouth 2 times daily.      simvastatin (ZOCOR) 20 MG tablet TAKE ONE TABLET BY MOUTH ONCE DAILY *VIALS* 30 tablet 11    tamsulosin (FLOMAX) 0.4 MG capsule Take 1 capsule (0.4 mg) by mouth every evening 90 capsule 3    XARELTO ANTICOAGULANT 15 MG TABS tablet TAKE ONE TABLET BY MOUTH EVERY EVENING WITH A MEAL*VIALS* 30 tablet 11     ALLERGIES:  Allergies   Allergen Reactions    Warfarin Itching     Alopecia    Nsaids Nephrotoxicity     Patient avoids due to kidney function     FAMILY HISTORY:  Family History   Problem Relation Age of Onset    Cataracts Mother     Dementia Mother     Diabetes Mother     Alzheimer Disease Mother     Hypertension Father     Heart Disease Father     Kidney Disease Father     Diabetes Maternal Grandmother     Diabetes Maternal Grandfather     Spina bifida Brother     Low Back Problems Daughter      SOCIAL HISTORY:  Tobacco Use    Smoking status: Former     Types: Cigarettes, Pipe, Cigars    Smokeless tobacco: Never   Substance Use Topics    Alcohol use: Yes     Comment: 2 beers per evening    Drug use: No     REVIEW OF SYSTEMS:  Other than as stated in the history of present illness and all or past medical history, comprehensive review of other systems was performed and was unremarkable.    PHYSICAL EXAMINATION:  BP (!) 147/70 (BP Location: Left arm, Patient Position: Chair, Cuff Size: Adult Regular)   Pulse 53   Ht 1.803 m (5' 11\")   Wt 120.2 kg (265 lb)   SpO2 98%   BMI 36.96 " kg/m    GENERAL APPEARANCE: alert and in no acute distress.  Comfortable sitting in wheelchair.  HEENT: no icterus, no central cyanosis  LYMPH/NECK: no adenopathy, no asymmetry, no appreciable neck mass.  RESPIRATORY: lungs clear to auscultation - no rales, rhonchi or wheezes, no use of accessory muscles, no retractions, respirations are unlabored, normal respiratory rate  CARDIOVASCULAR: Auscultation reveals normal heart sounds.  There is a grade 1/6 systolic murmur at the apex.  There is no diastolic murmur.  GI: soft, non tender.  No hepatosplenomegaly.  EXTREMITIES: Chronic bilateral lower extremity edema with support stockings.  NEURO: alert, normal speech,and affect.  Wheelchair-bound due to disequilibrium related to Parkinson's disease.  SKIN: no ecchymoses, no rashes    I have reviewed the labs and imaging results below and made my comment in the assessment and plan.    DIAGNOSTIC DATA:  CBC RESULTS:   Lab Results   Component Value Date    WBC 5.4 07/16/2024    WBC 4.9 11/19/2012    RBC 4.26 (L) 07/16/2024    RBC 4.73 11/19/2012    HGB 12.1 (L) 07/16/2024    HGB 14.0 11/19/2012    HCT 36.4 (L) 07/16/2024    HCT 40.3 11/19/2012    MCV 85 07/16/2024    MCV 85 11/19/2012    MCH 28.4 07/16/2024    MCH 29.6 11/19/2012    MCHC 33.2 07/16/2024    MCHC 34.7 11/19/2012    RDW 14.2 07/16/2024    RDW 14.8 11/19/2012     (L) 07/16/2024     11/19/2012     BMP RESULTS:  Lab Results   Component Value Date     (L) 11/12/2024     11/19/2012    POTASSIUM 4.4 11/12/2024    POTASSIUM 4.8 11/19/2012    CHLORIDE 97 (L) 11/12/2024    CHLORIDE 108 11/19/2012    CO2 21 (L) 11/12/2024    CO2 24 11/19/2012    ANIONGAP 12 11/12/2024    ANIONGAP 8 11/19/2012    GLC 94 11/12/2024    GLC 90 11/19/2012    BUN 15.1 11/12/2024    BUN 25 11/19/2012    CR 1.34 (H) 11/12/2024    CR 1.33 (H) 11/19/2012    GFRESTIMATED 52 (L) 11/12/2024    GFRESTIMATED 53 (L) 11/19/2012    GFRESTBLACK 64 11/19/2012    YANELI 9.1 11/12/2024     YANELI 8.8 11/19/2012      Electrocardiogram  Twelve-lead electrocardiogram obtained in the clinic on 12/10/2024 documented underlying atrial fibrillation with slow ventricular response.  R-R intervals varied from between 840-1640 ms with average ventricular rate of 51 bpm.  There was nonspecific intraventricular conduction delay with a QRS duration of 116 ms.  QT and QTc intervals were measured at 444 and 409 ms respectively.  There was left axis deviation consistent with left anterior hemifascicular block.  There was old septal myocardial infarction.    Echocardiogram  Echocardiogram completed on 5/9/2023 was reported to show normal left ventricular systolic function with an estimated left ventricular ejection fraction of 60 to 65%.  Left ventricular size and wall thickness could not be evaluated.  There was normal right ventricular size and function.  There was normally functioning bioprosthetic aortic valve with a mean gradient of 6 mmHg.  There was no evidence of paravalvular or valvular regurgitation.    ASSESSMENT AND PLAN:   Other than subtle slightly reduced physical stamina and activity endurance since his last cardiology clinic visit with Dr. Romero on 5/11/2023, he appears to have remained relatively stable from a hemodynamic standpoint with no interim significant symptoms referable to the cardiovascular system.  His beta-blocker apparently has been discontinued since his last cardiology clinic visit presumably because of his underlying permanent atrial fibrillation with slow ventricular response.  In view of persistent systolic blood pressure of between 147 and up to 170 mmHg during the clinic encounter, I took the liberty of starting him on lisinopril.  Lisinopril was chosen in view of his underlying diabetes mellitus with stage III chronic kidney disease in the absence of significant side effects on his electronic medical record and per patient's report.  At this point, it would be prudent to repeat  an echocardiogram to document current status of his bioprosthetic aortic valve about 3 years following implantation.  Similarly, an extended period of Holter monitoring to document his ventricular rate to rule out clinically significant pauses would be reasonable.  At the conclusion of the clinic encounter, both the patient and his son appeared to have a reasonable understanding of our discussion and stated no questions regarding the rationale for the above stated management strategy.  He would like to proceed as recommended.  For patient's convenience since he requires transport by his son from his assisted living facility, echocardiogram will be scheduled to be performed in the spring 2025.  A few weeks prior to the echocardiography study, a 2-week of Zio patch Holter monitoring will be mailed to the patient to be applied to document his ventricular rate during different levels of physical activities.  He will return to the cardiology clinic for follow-up in about a year, sooner if he should develop new symptoms and depending on findings on the echocardiogram and extended Holter monitoring using a Zio patch.  In addition, he will follow-up with his primary care provider with a BMP check in about a week following the initiation of 5 mg daily of lisinopril    Total time spent today for this visit is 40 minutes including precharting, face-to-face clinic visit, review of labs/imaging and medical documentation.    Nicky Llamas MD, FACC, FACROW, FHRS, CCDS  Cardiologist    (This medical documentation was transcribed using voice recognition technology and therefore will be susceptible to unintentional transcribing errors and/or omissions)

## 2024-12-12 NOTE — NURSING NOTE
To Do    1. Echocardiogram and follow up in April   2. 7 days zio in the spring - will mail to you  3. START lisinopril 5 mg daily for high blood pressure - okay to have pcp follow    Linda Aguirre RN

## 2025-01-05 ENCOUNTER — HEALTH MAINTENANCE LETTER (OUTPATIENT)
Age: 85
End: 2025-01-05

## 2025-01-15 ENCOUNTER — ASSISTED LIVING VISIT (OUTPATIENT)
Dept: GERIATRICS | Facility: CLINIC | Age: 85
End: 2025-01-15
Payer: MEDICARE

## 2025-01-15 VITALS
DIASTOLIC BLOOD PRESSURE: 69 MMHG | SYSTOLIC BLOOD PRESSURE: 142 MMHG | WEIGHT: 262 LBS | HEART RATE: 55 BPM | TEMPERATURE: 97.4 F | RESPIRATION RATE: 23 BRPM | OXYGEN SATURATION: 97 % | BODY MASS INDEX: 36.54 KG/M2

## 2025-01-15 DIAGNOSIS — K21.9 GASTROESOPHAGEAL REFLUX DISEASE WITHOUT ESOPHAGITIS: ICD-10-CM

## 2025-01-15 DIAGNOSIS — G89.29 CHRONIC BILATERAL LOW BACK PAIN WITH RIGHT-SIDED SCIATICA: ICD-10-CM

## 2025-01-15 DIAGNOSIS — M54.41 CHRONIC BILATERAL LOW BACK PAIN WITH RIGHT-SIDED SCIATICA: ICD-10-CM

## 2025-01-15 DIAGNOSIS — I50.9 CHRONIC CONGESTIVE HEART FAILURE, UNSPECIFIED HEART FAILURE TYPE (H): Primary | ICD-10-CM

## 2025-01-15 DIAGNOSIS — R05.9 COUGH, UNSPECIFIED TYPE: ICD-10-CM

## 2025-01-15 PROCEDURE — 99349 HOME/RES VST EST MOD MDM 40: CPT | Performed by: NURSE PRACTITIONER

## 2025-01-15 RX ORDER — GABAPENTIN 300 MG/1
300 CAPSULE ORAL AT BEDTIME
Status: SHIPPED
Start: 2025-01-15

## 2025-01-15 NOTE — PROGRESS NOTES
[facility] GERIATRICS    Chief Complaint   Patient presents with    RECHECK     HPI:  Luz Elena Sampson is a 84 year old  (1940), who is being seen today for an episodic care visit at: [facility] ([facility]) [[id number]]. Today's concern is:   1. Chronic congestive heart failure, unspecified heart failure type (H)    2. Cough, unspecified type    3. Gastroesophageal reflux disease without esophagitis    4. Chronic bilateral low back pain with right-sided sciatica      Patient seen for follow up, mild hypervolemia with edema legs 1-2+, mild SOB with activity, appears healthy, recent BNP 3340, also newer onset cough with clear lungs, worried about pneumonia, also GERD medication not effective and having gut disturbances particularly in evenings, also newer onset back pain now radiating down R leg prior to knee, states worse in evenings and having difficulty sleeping.    Allergies, and PMH/PSH reviewed in EPIC today.  REVIEW OF SYSTEMS:  4 point ROS including Respiratory, CV, GI and , other than that noted in the HPI,  is negative    Objective:   BP (!) 142/69   Pulse 55   Temp 97.4  F (36.3  C)   Resp 23   Wt 118.8 kg (262 lb)   SpO2 97%   BMI 36.54 kg/m    GENERAL APPEARANCE:  in no distress, appears healthy  ENT:  Mouth and posterior oropharynx normal, moist mucous membranes  RESP:  lungs clear to auscultation , no respiratory distress  CV:  peripheral edema moderate+ in lower legs, rate-normal  ABDOMEN:  bowel sounds normal  M/S:   Gait and station abnormal unsteady  SKIN:  Inspection of skin and subcutaneous tissue baseline  NEURO:   Examination of sensation by touch normal  PSYCH:  affect and mood normal    Labs done in SNF are in [facility]. Please refer to them using [employer]/Care Everywhere.    Assessment/Plan:  (I50.9) Chronic congestive heart failure, unspecified heart failure type (H)  (primary encounter diagnosis)  Comment: BNP 3340, moderate edema and SOB  Plan:    -continue bumex daily, statin  -repeat BMP, BNP in 2-3 months    (R05.9) Cough, unspecified type  Comment: newer onset, lungs clear  Plan:   -continue mucinex BID, no end date  -CXR ordered, results pending    (K21.9) Gastroesophageal reflux disease without esophagitis  Comment: current med not effective  Plan:   -discontinue pantoprazole  -start omeprazole 20mg BID  -monitor gut health    (M54.41,  G89.29) Chronic bilateral low back pain with right-sided sciatica  Comment: newer onset, more bothersome now, radiating down back of R leg  Plan:  -start gabapentin 300mg at bedtime  -if not improving plan to double the dose in 1-2 weeks      MED REC REQUIRED  Post Medication Reconciliation Status: medication reconcilation previously completed during another office visit      Electronically signed by: JITENDRA Freitas CNP

## 2025-01-15 NOTE — LETTER
1/15/2025      Luz Elena Sampson  Attn Parmjit Sampson  [address]  [place] [address]        [facility] GERIATRICS    Chief Complaint   Patient presents with     RECHECK     HPI:  Luz Elena Sampson is a 84 year old  (1940), who is being seen today for an episodic care visit at: [facility] [[id number]]. Today's concern is:   1. Chronic congestive heart failure, unspecified heart failure type (H)    2. Cough, unspecified type    3. Gastroesophageal reflux disease without esophagitis    4. Chronic bilateral low back pain with right-sided sciatica      Patient seen for follow up, mild hypervolemia with edema legs 1-2+, mild SOB with activity, appears healthy, recent BNP 3340, also newer onset cough with clear lungs, worried about pneumonia, also GERD medication not effective and having gut disturbances particularly in evenings, also newer onset back pain now radiating down R leg prior to knee, states worse in evenings and having difficulty sleeping.    Allergies, and PMH/PSH reviewed in EPIC today.  REVIEW OF SYSTEMS:  4 point ROS including Respiratory, CV, GI and , other than that noted in the HPI,  is negative    Objective:   BP (!) 142/69   Pulse 55   Temp 97.4  F (36.3  C)   Resp 23   Wt 118.8 kg (262 lb)   SpO2 97%   BMI 36.54 kg/m    GENERAL APPEARANCE:  in no distress, appears healthy  ENT:  Mouth and posterior oropharynx normal, moist mucous membranes  RESP:  lungs clear to auscultation , no respiratory distress  CV:  peripheral edema moderate+ in lower legs, rate-normal  ABDOMEN:  bowel sounds normal  M/S:   Gait and station abnormal unsteady  SKIN:  Inspection of skin and subcutaneous tissue baseline  NEURO:   Examination of sensation by touch normal  PSYCH:  affect and mood normal    Labs done in SNF are in [facility]. Please refer to them using EPIC/Care Everywhere.    Assessment/Plan:  (I50.9) Chronic congestive heart failure, unspecified heart failure  type (H)  (primary encounter diagnosis)  Comment: BNP 3340, moderate edema and SOB  Plan:   -continue bumex daily, statin  -repeat BMP, BNP in 2-3 months    (R05.9) Cough, unspecified type  Comment: newer onset, lungs clear  Plan:   -continue mucinex BID, no end date  -CXR ordered, results pending    (K21.9) Gastroesophageal reflux disease without esophagitis  Comment: current med not effective  Plan:   -discontinue pantoprazole  -start omeprazole 20mg BID  -monitor gut health    (M54.41,  G89.29) Chronic bilateral low back pain with right-sided sciatica  Comment: newer onset, more bothersome now, radiating down back of R leg  Plan:  -start gabapentin 300mg at bedtime  -if not improving plan to double the dose in 1-2 weeks      MED REC REQUIRED  Post Medication Reconciliation Status: medication reconcilation previously completed during another office visit      Electronically signed by: JITENDRA Freitas CNP          Sincerely,        JITENDRA Freitas CNP    Electronically signed

## 2025-01-22 ENCOUNTER — ASSISTED LIVING VISIT (OUTPATIENT)
Dept: GERIATRICS | Facility: CLINIC | Age: 85
End: 2025-01-22
Payer: MEDICARE

## 2025-01-22 VITALS
WEIGHT: 257 LBS | OXYGEN SATURATION: 98 % | BODY MASS INDEX: 35.84 KG/M2 | TEMPERATURE: 97.7 F | HEART RATE: 64 BPM | SYSTOLIC BLOOD PRESSURE: 137 MMHG | RESPIRATION RATE: 20 BRPM | DIASTOLIC BLOOD PRESSURE: 77 MMHG

## 2025-01-22 DIAGNOSIS — R33.9 URINE RETENTION: ICD-10-CM

## 2025-01-22 DIAGNOSIS — G89.29 CHRONIC BILATERAL LOW BACK PAIN WITH RIGHT-SIDED SCIATICA: ICD-10-CM

## 2025-01-22 DIAGNOSIS — M54.41 CHRONIC BILATERAL LOW BACK PAIN WITH RIGHT-SIDED SCIATICA: ICD-10-CM

## 2025-01-22 DIAGNOSIS — N18.30 STAGE 3 CHRONIC KIDNEY DISEASE, UNSPECIFIED WHETHER STAGE 3A OR 3B CKD (H): Primary | ICD-10-CM

## 2025-01-22 DIAGNOSIS — R05.9 COUGH, UNSPECIFIED TYPE: ICD-10-CM

## 2025-01-22 PROCEDURE — 99349 HOME/RES VST EST MOD MDM 40: CPT | Performed by: NURSE PRACTITIONER

## 2025-01-22 RX ORDER — GABAPENTIN 300 MG/1
300 CAPSULE ORAL 2 TIMES DAILY
Status: SHIPPED
Start: 2025-01-22

## 2025-01-22 NOTE — LETTER
1/22/2025      Luz Elena Sampson  Attn Parmjit Sampson  [address]  [place] [address]        M [facility] GERIATRICS    Chief Complaint   Patient presents with     RECHECK     HPI:  Luz Elena Sampson is a 84 year old  (1940), who is being seen today for an episodic care visit at: [facility] (Counts include 234 beds at the [facility]) [[id number]]. Today's concern is:   1. Stage 3 chronic kidney disease, unspecified whether stage 3a or 3b CKD (H)    2. Chronic bilateral low back pain with right-sided sciatica    3. Urine retention    4. Cough, unspecified type      Patient seen for follow up, creat 1.34, GFR 52, mild hypervolemia, oriented, memory loss, back pain improved at night with gabapentin and requesting morning dose, also increased urine frequency with incomplete emptying and needing to urinate often at night and is bothersome, also still coughing, CXR 1/16 was clear, bronchial secretions and is able to clear, afebrile, healthy.    Allergies, and PMH/PSH reviewed in EPIC today.  REVIEW OF SYSTEMS:  4 point ROS including Respiratory, CV, GI and , other than that noted in the HPI,  is negative    Objective:   /77   Pulse 64   Temp 97.7  F (36.5  C)   Resp 20   Wt 116.6 kg (257 lb)   SpO2 98%   BMI 35.84 kg/m    GENERAL APPEARANCE:  in no distress, appears healthy  ENT:  Mouth and posterior oropharynx normal, moist mucous membranes  RESP:  lungs clear to auscultation , no respiratory distress  CV:  peripheral edema 1-2+ in lower legs/pedal, rate-normal  ABDOMEN:  bowel sounds normal  M/S:   Gait and station abnormal unsteady  SKIN:  Inspection of skin and subcutaneous tissue baseline  NEURO:   Examination of sensation by touch normal  PSYCH:  affect and mood normal    Labs done in SNF are in [place] EPIC. Please refer to them using EPIC/Care Everywhere.    Assessment/Plan:  (N18.30) Stage 3 chronic kidney disease, unspecified whether stage 3a or 3b CKD (H)  (primary encounter diagnosis)  Comment:  creat 1.34, GFR 52  Plan:   -dose meds renally  -repeat BMP in 3-6 months    (M54.41,  G89.29) Chronic bilateral low back pain with right-sided sciatica  Comment: newer onset, altering gait and sleep  Plan:   -change gabapentin to 300mg BID  -monitor back pain and sleep  -consider increasing to TID    (R33.9) Urine retention  Comment: newer frequency, retention, incomplete voiding  Plan:   -patient to monitor status, drink plety of fluids  -consider UA/UC if fever/dysuria  -consider trial mybetriq or ditropan if not resolving    (R05.9) Cough, unspecified type  Comment: chronic, bronchial secretions  Plan:   -continue mucinex BID  -no plan to start scopolamine or atropine due to side effects    MED REC REQUIRED  Post Medication Reconciliation Status: medication reconcilation previously completed during another office visit      Electronically signed by: JITENDRA Freitas CNP          Sincerely,        JITENDRA Freitas CNP    Electronically signed

## 2025-01-22 NOTE — PROGRESS NOTES
[facility] GERIATRICS    Chief Complaint   Patient presents with    RECHECK     HPI:  Luz Elena Sampson is a 84 year old  (1940), who is being seen today for an episodic care visit at: [facility] [[id number]]. Today's concern is:   1. Stage 3 chronic kidney disease, unspecified whether stage 3a or 3b CKD (H)    2. Chronic bilateral low back pain with right-sided sciatica    3. Urine retention    4. Cough, unspecified type      Patient seen for follow up, creat 1.34, GFR 52, mild hypervolemia, oriented, memory loss, back pain improved at night with gabapentin and requesting morning dose, also increased urine frequency with incomplete emptying and needing to urinate often at night and is bothersome, also still coughing, CXR 1/16 was clear, bronchial secretions and is able to clear, afebrile, healthy.    Allergies, and PMH/PSH reviewed in EPIC today.  REVIEW OF SYSTEMS:  4 point ROS including Respiratory, CV, GI and , other than that noted in the HPI,  is negative    Objective:   /77   Pulse 64   Temp 97.7  F (36.5  C)   Resp 20   Wt 116.6 kg (257 lb)   SpO2 98%   BMI 35.84 kg/m    GENERAL APPEARANCE:  in no distress, appears healthy  ENT:  Mouth and posterior oropharynx normal, moist mucous membranes  RESP:  lungs clear to auscultation , no respiratory distress  CV:  peripheral edema 1-2+ in lower legs/pedal, rate-normal  ABDOMEN:  bowel sounds normal  M/S:   Gait and station abnormal unsteady  SKIN:  Inspection of skin and subcutaneous tissue baseline  NEURO:   Examination of sensation by touch normal  PSYCH:  affect and mood normal    Labs done in SNF are in [facility]. Please refer to them using [employer]/Care Everywhere.    Assessment/Plan:  (N18.30) Stage 3 chronic kidney disease, unspecified whether stage 3a or 3b CKD (H)  (primary encounter diagnosis)  Comment: creat 1.34, GFR 52  Plan:   -dose meds renally  -repeat BMP in 3-6 months    (M54.41,  G89.29)  Chronic bilateral low back pain with right-sided sciatica  Comment: newer onset, altering gait and sleep  Plan:   -change gabapentin to 300mg BID  -monitor back pain and sleep  -consider increasing to TID    (R33.9) Urine retention  Comment: newer frequency, retention, incomplete voiding  Plan:   -patient to monitor status, drink plety of fluids  -consider UA/UC if fever/dysuria  -consider trial mybetriq or ditropan if not resolving    (R05.9) Cough, unspecified type  Comment: chronic, bronchial secretions  Plan:   -continue mucinex BID  -no plan to start scopolamine or atropine due to side effects    MED REC REQUIRED  Post Medication Reconciliation Status: medication reconcilation previously completed during another office visit      Electronically signed by: JITENDRA Freitas CNP

## 2025-01-29 ENCOUNTER — ASSISTED LIVING VISIT (OUTPATIENT)
Dept: GERIATRICS | Facility: CLINIC | Age: 85
End: 2025-01-29
Payer: MEDICARE

## 2025-01-29 VITALS
OXYGEN SATURATION: 89 % | BODY MASS INDEX: 35.84 KG/M2 | SYSTOLIC BLOOD PRESSURE: 165 MMHG | TEMPERATURE: 97.2 F | DIASTOLIC BLOOD PRESSURE: 89 MMHG | HEART RATE: 57 BPM | RESPIRATION RATE: 13 BRPM | WEIGHT: 257 LBS

## 2025-01-29 DIAGNOSIS — I48.91 ATRIAL FIBRILLATION, UNSPECIFIED TYPE (H): Primary | ICD-10-CM

## 2025-01-29 DIAGNOSIS — H11.31 SCLERAL HEMORRHAGE OF RIGHT EYE: ICD-10-CM

## 2025-01-29 DIAGNOSIS — S51.811A SKIN TEAR OF RIGHT FOREARM WITHOUT COMPLICATION, INITIAL ENCOUNTER: ICD-10-CM
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
## 2025-01-29 DIAGNOSIS — H05.232 PERIORBITAL HEMATOMA OF LEFT EYE: ICD-10-CM

## 2025-01-29 PROCEDURE — 99349 HOME/RES VST EST MOD MDM 40: CPT | Performed by: NURSE PRACTITIONER

## 2025-01-29 NOTE — LETTER
1/29/2025      Luz Elena Sampson  Attn Parmjit Sampson  [address]  [place] [address]        M [facility] GERIATRICS    Chief Complaint   Patient presents with     RECHECK     HPI:  Luz Elena Sampson is a 84 year old  (1940), who is being seen today for an episodic care visit at: [facility] ([facility]) [[id number]]. Today's concern is:   1. Atrial fibrillation, unspecified type (H)    2. Fall, initial encounter    3. Skin tear of right forearm without complication, initial encounter    4. Periorbital hematoma of left eye    5. Scleral hemorrhage of right eye      Patient seen for follow up, on xarelto for Afib, RRR, soft SBP, fall getting out of bed on 1/25 with ER visit, R elbow skin tear approx 8cm glued and steri strips, no s/s infection scant bleeding, L eye periorbital hematoma still dark, mid-forehead mild abrasion, R eye sclera 90% red, vision intact, denies headache, 107/50, 58, unknown etiology for fall, not dizzy, just slipped.    Allergies, and PMH/PSH reviewed in EPIC today.  REVIEW OF SYSTEMS:  4 point ROS including Respiratory, CV, GI and , other than that noted in the HPI,  is negative    Objective:   BP (!) 165/89   Pulse 57   Temp 97.2  F (36.2  C)   Resp 13   Wt 116.6 kg (257 lb)   SpO2 (!) 89%   BMI 35.84 kg/m    GENERAL APPEARANCE:  in no distress, appears healthy  ENT:  Mouth and posterior oropharynx normal, moist mucous membranes  RESP:  lungs clear to auscultation , no respiratory distress  CV:  peripheral edema 2+ in lower legs, rate-normal  ABDOMEN:  bowel sounds normal  M/S:   Gait and station abnormal unsteady  SKIN:  Inspection of skin and subcutaneous tissue baseline  NEURO:   Examination of sensation by touch normal  PSYCH:  affect and mood normal    Labs done in SNF are in [facility]. Please refer to them using [employer]/Care Everywhere.    Assessment/Plan:  (I48.91) Atrial fibrillation, unspecified type (H)  (primary encounter diagnosis)  (W19.XXXA)  Fall, initial encounter  (S51.831A) Skin tear of right forearm without complication, initial encounter  (H05.232) Periorbital hematoma of left eye  (H11.31) Scleral hemorrhage of right eye  Comment: post fall, on xarelto, soft BP's, no s/s infection, vision intact  Plan:   -continue xarelto  -discontinue lisinopril  -R elbow tear; cleanse/kerlix/tape today then PRN  -declines PT/OT referral  -use walker in home  -no pain medication indicated  -plan to follow up on status next week    MED REC REQUIRED  Post Medication Reconciliation Status: medication reconcilation previously completed during another office visit          Electronically signed by: JITENDRA Freitas CNP          Sincerely,        JITENDRA Freitas CNP    Electronically signed

## 2025-01-29 NOTE — PROGRESS NOTES
[facility] GERIATRICS    Chief Complaint   Patient presents with    RECHECK     HPI:  Luz Elena Sampson is a 84 year old  (1940), who is being seen today for an episodic care visit at: [facility] ([facility]) [[id number]]. Today's concern is:   1. Atrial fibrillation, unspecified type (H)    2. Fall, initial encounter    3. Skin tear of right forearm without complication, initial encounter    4. Periorbital hematoma of left eye    5. Scleral hemorrhage of right eye      Patient seen for follow up, on xarelto for Afib, RRR, soft SBP, fall getting out of bed on 1/25 with ER visit, R elbow skin tear approx 8cm glued and steri strips, no s/s infection scant bleeding, L eye periorbital hematoma still dark, mid-forehead mild abrasion, R eye sclera 90% red, vision intact, denies headache, 107/50, 58, unknown etiology for fall, not dizzy, just slipped.    Allergies, and PMH/PSH reviewed in EPIC today.  REVIEW OF SYSTEMS:  4 point ROS including Respiratory, CV, GI and , other than that noted in the HPI,  is negative    Objective:   BP (!) 165/89   Pulse 57   Temp 97.2  F (36.2  C)   Resp 13   Wt 116.6 kg (257 lb)   SpO2 (!) 89%   BMI 35.84 kg/m    GENERAL APPEARANCE:  in no distress, appears healthy  ENT:  Mouth and posterior oropharynx normal, moist mucous membranes  RESP:  lungs clear to auscultation , no respiratory distress  CV:  peripheral edema 2+ in lower legs, rate-normal  ABDOMEN:  bowel sounds normal  M/S:   Gait and station abnormal unsteady  SKIN:  Inspection of skin and subcutaneous tissue baseline  NEURO:   Examination of sensation by touch normal  PSYCH:  affect and mood normal    Labs done in SNF are in [facility]. Please refer to them using EPIC/Care Everywhere.    Assessment/Plan:  (I48.91) Atrial fibrillation, unspecified type (H)  (primary encounter diagnosis)  (W19.XXXA) Fall, initial encounter  (T85.862K) Skin tear of right forearm without complication, initial  encounter  (H05.232) Periorbital hematoma of left eye  (H11.31) Scleral hemorrhage of right eye  Comment: post fall, on xarelto, soft BP's, no s/s infection, vision intact  Plan:   -continue xarelto  -discontinue lisinopril  -R elbow tear; cleanse/kerlix/tape today then PRN  -declines PT/OT referral  -use walker in home  -no pain medication indicated  -plan to follow up on status next week    MED REC REQUIRED  Post Medication Reconciliation Status: medication reconcilation previously completed during another office visit          Electronically signed by: JITENDRA Freitas CNP

## 2025-02-06 ENCOUNTER — DOCUMENTATION ONLY (OUTPATIENT)
Dept: GERIATRICS | Facility: CLINIC | Age: 85
End: 2025-02-06
Payer: MEDICARE

## 2025-02-06 VITALS
OXYGEN SATURATION: 95 % | SYSTOLIC BLOOD PRESSURE: 151 MMHG | RESPIRATION RATE: 16 BRPM | BODY MASS INDEX: 35.29 KG/M2 | WEIGHT: 253 LBS | TEMPERATURE: 97.9 F | DIASTOLIC BLOOD PRESSURE: 84 MMHG | HEART RATE: 60 BPM

## 2025-02-06 RX ORDER — OXYCODONE HYDROCHLORIDE 5 MG/1
2.5 TABLET ORAL EVERY 6 HOURS PRN
COMMUNITY
Start: 2025-02-06

## 2025-02-06 RX ORDER — MULTIVITAMIN
1 TABLET ORAL DAILY
COMMUNITY
Start: 2025-02-07

## 2025-02-06 RX ORDER — LIDOCAINE 4 G/G
1 PATCH TOPICAL 2 TIMES DAILY
COMMUNITY
Start: 2025-02-07

## 2025-02-06 RX ORDER — MOMETASONE FUROATE MONOHYDRATE 50 UG/1
2 SPRAY, METERED NASAL DAILY
COMMUNITY
Start: 2025-02-06

## 2025-02-06 RX ORDER — FERROUS SULFATE 325(65) MG
325 TABLET ORAL DAILY
COMMUNITY
Start: 2025-02-06

## 2025-02-06 RX ORDER — LANOLIN ALCOHOL/MO/W.PET/CERES
1 CREAM (GRAM) TOPICAL DAILY
COMMUNITY
Start: 2025-02-07

## 2025-02-06 NOTE — PROGRESS NOTES
[facility] GERIATRICS    PRIMARY CARE PROVIDER AND CLINIC:  Chandler Oconnell, JITENDRA CNP, [address] [facility] [id number]***  Chief Complaint   Patient presents with    Hospital F/U      [facility] Medical Record Number:  [id number]  Place of Service where encounter took place:  [facility] ([facility]) [[id number]]    Luz Elena Sampson  is a 84 year old  (1940), admitted to the above facility from  [facility] . Hospital stay 2/1/2025 through 2/6/2025..   HPI:    ***    CODE STATUS/ADVANCE DIRECTIVES DISCUSSION:  Full Code  {CODE STATUS:259930}  ALLERGIES:   Allergies   Allergen Reactions    Warfarin Itching     Alopecia    Nsaids Nephrotoxicity     Patient avoids due to kidney function      PAST MEDICAL HISTORY:   Past Medical History:   Diagnosis Date    Atrial fibrillation (H)     Chronic kidney disease     GERD (gastroesophageal reflux disease)     Hyperlipidemia     Hypertension     Hypothyroid     Psoriasis     Syncope 1/29/2023      PAST SURGICAL HISTORY:   has a past surgical history that includes Cholecystectomy; Arthroplasty hip; Arthrodesis ankle (06/08/2011); Remove hardware ankle (07/10/2012); Cataract Extraction (Bilateral); Transcatheter Aortic Valve Replacement - Transaortic Approach (N/A); and cataract iol, rt/lt.  FAMILY HISTORY: family history includes Alzheimer Disease in his mother; Cataracts in his mother; Dementia in his mother; Diabetes in his maternal grandfather, maternal grandmother, and mother; Heart Disease in his father; Hypertension in his father; Kidney Disease in his father; Low Back Problems in his daughter; Spina bifida in his brother.  SOCIAL HISTORY:   reports that he has quit smoking. His smoking use included cigarettes, pipe, and cigars. He has never used smokeless tobacco. He reports current alcohol use. He reports that he does not use drugs.  Patient's living condition: {LIVES WITH (NURSING HOME):928544}    Post Discharge Medication Reconciliation  Status:   MED REC REQUIRED{TIP  Click the link below to document or use med rec list, use list to pull in response :028870}  Post Medication Reconciliation Status: {MED REC LIST:346743}       Current Outpatient Medications   Medication Sig Dispense Refill    ferrous sulfate (FEROSUL) 325 (65 Fe) MG tablet Take 325 mg by mouth daily.      mometasone (NASONEX) 50 MCG/ACT nasal spray Spray 2 sprays into both nostrils daily.      oxyCODONE (ROXICODONE) 5 MG tablet Take 2.5 mg by mouth every 6 hours as needed.      ACE/ARB/ARNI NOT PRESCRIBED (INTENTIONAL) Please choose reason not prescribed from choices below.      bumetanide (BUMEX) 1 MG tablet Take 1 tablet (1 mg) by mouth daily      carbidopa-levodopa (SINEMET)  MG tablet Take 1 tablet by mouth nightly as needed (RLS). 1 tablet 0    clobetasol (TEMOVATE) 0.05 % external solution Apply topically 2 times daily      econazole nitrate 1 % external cream Apply topically daily Apply 2gm to left foot BID      gabapentin (NEURONTIN) 300 MG capsule Take 1 capsule (300 mg) by mouth 2 times daily.      guaiFENesin (MUCINEX) 600 MG 12 hr tablet TAKE ONE TABLET BY MOUTH TWICE DAILY FOR COUGH *VIALS* 60 tablet 11    levothyroxine (SYNTHROID/LEVOTHROID) 125 MCG tablet Take 1 tablet (125 mcg) by mouth daily      [START ON 2/7/2025] Lidocaine (LIDOCARE) 4 % Patch Place 1 patch onto the skin 2 times daily.      [START ON 2/7/2025] Multiple Vitamin (ONE-A-DAY ESSENTIAL) TABS Take 1 tablet by mouth daily.      omeprazole (PRILOSEC) 20 MG DR capsule Take 1 capsule (20 mg) by mouth 2 times daily.      ORDER FOR [facility] Wheelchair; Right elevating leg rest  [employer]  Phone: [phone]  Diagnosis: Gait Instability related to Right Ankle Fusion 1 Device 0    simvastatin (ZOCOR) 20 MG tablet TAKE ONE TABLET BY MOUTH ONCE DAILY *VIALS* 30 tablet 11    tamsulosin (FLOMAX) 0.4 MG capsule Take 1 capsule (0.4 mg) by mouth every evening 90 capsule 3    [START ON 2/7/2025] thiamine (B-1) 100 MG  tablet Take 1 tablet by mouth daily.      XARELTO ANTICOAGULANT 15 MG TABS tablet TAKE ONE TABLET BY MOUTH EVERY EVENING WITH A MEAL*VIALS* 30 tablet 11     No current facility-administered medications for this visit.       ROS:  {ROS FGS:357997}    Vitals:  BP (!) 151/84   Pulse 60   Temp 97.9  F (36.6  C)   Resp 16   Wt 114.8 kg (253 lb)   SpO2 95%   BMI 35.29 kg/m    Exam:  {Nursing home physical exam :290677}    Lab/Diagnostic data:  {fgslab:650567}    ASSESSMENT/PLAN:    {FGS DX2:748639}    Orders:  {fgsorders:210695}  ***    Electronically signed by:  Mandy Galindo ***

## 2025-02-07 ENCOUNTER — TRANSITIONAL CARE UNIT VISIT (OUTPATIENT)
Dept: GERIATRICS | Facility: CLINIC | Age: 85
End: 2025-02-07
Payer: MEDICARE

## 2025-02-07 PROBLEM — I60.9 SUBARACHNOID HEMORRHAGE (H): Status: ACTIVE | Noted: 2025-02-07

## 2025-02-07 PROBLEM — R29.6 RECURRENT FALLS: Status: ACTIVE | Noted: 2025-02-07

## 2025-02-07 PROBLEM — I48.91 ATRIAL FIBRILLATION, UNSPECIFIED TYPE (H): Status: ACTIVE | Noted: 2025-02-07

## 2025-02-07 PROBLEM — S06.5XAA SUBDURAL HEMATOMA (H): Status: ACTIVE | Noted: 2025-02-07

## 2025-02-07 NOTE — LETTER
2/7/2025      Luz Elena Sampson  [address]  [place] [address]        No notes on file      Sincerely,        JITENDRA Freitas CNP    Electronically signed

## 2025-02-10 ENCOUNTER — LAB REQUISITION (OUTPATIENT)
Dept: LAB | Facility: CLINIC | Age: 85
End: 2025-02-10
Payer: MEDICARE

## 2025-02-10 VITALS
WEIGHT: 244 LBS | SYSTOLIC BLOOD PRESSURE: 118 MMHG | OXYGEN SATURATION: 99 % | TEMPERATURE: 97.2 F | BODY MASS INDEX: 34.03 KG/M2 | HEART RATE: 56 BPM | RESPIRATION RATE: 18 BRPM | DIASTOLIC BLOOD PRESSURE: 67 MMHG

## 2025-02-10 DIAGNOSIS — D64.9 ANEMIA, UNSPECIFIED: ICD-10-CM

## 2025-02-10 LAB
ANION GAP SERPL CALCULATED.3IONS-SCNC: 12 MMOL/L (ref 7–15)
BUN SERPL-MCNC: 20 MG/DL (ref 8–23)
CALCIUM SERPL-MCNC: 8.6 MG/DL (ref 8.8–10.4)
CHLORIDE SERPL-SCNC: 100 MMOL/L (ref 98–107)
CREAT SERPL-MCNC: 1.39 MG/DL (ref 0.67–1.17)
EGFRCR SERPLBLD CKD-EPI 2021: 50 ML/MIN/1.73M2
ERYTHROCYTE [DISTWIDTH] IN BLOOD BY AUTOMATED COUNT: 16.2 % (ref 10–15)
GLUCOSE SERPL-MCNC: 84 MG/DL (ref 70–99)
HCO3 SERPL-SCNC: 19 MMOL/L (ref 22–29)
HCT VFR BLD AUTO: 28.2 % (ref 40–53)
HGB BLD-MCNC: 8.8 G/DL (ref 13.3–17.7)
IRON SERPL-MCNC: 36 UG/DL (ref 61–157)
MCH RBC QN AUTO: 27.2 PG (ref 26.5–33)
MCHC RBC AUTO-ENTMCNC: 31.2 G/DL (ref 31.5–36.5)
MCV RBC AUTO: 87 FL (ref 78–100)
NT-PROBNP SERPL-MCNC: 4110 PG/ML (ref 0–1800)
PLATELET # BLD AUTO: 190 10E3/UL (ref 150–450)
POTASSIUM SERPL-SCNC: 4.2 MMOL/L (ref 3.4–5.3)
RBC # BLD AUTO: 3.23 10E6/UL (ref 4.4–5.9)
SODIUM SERPL-SCNC: 131 MMOL/L (ref 135–145)
TSH SERPL DL<=0.005 MIU/L-ACNC: 7.83 UIU/ML (ref 0.3–4.2)
WBC # BLD AUTO: 4 10E3/UL (ref 4–11)

## 2025-02-11 ENCOUNTER — TRANSITIONAL CARE UNIT VISIT (OUTPATIENT)
Dept: GERIATRICS | Facility: CLINIC | Age: 85
End: 2025-02-11
Payer: MEDICARE

## 2025-02-11 DIAGNOSIS — M54.41 CHRONIC BILATERAL LOW BACK PAIN WITH RIGHT-SIDED SCIATICA: ICD-10-CM

## 2025-02-11 DIAGNOSIS — I50.9 CHRONIC CONGESTIVE HEART FAILURE, UNSPECIFIED HEART FAILURE TYPE (H): Primary | ICD-10-CM

## 2025-02-11 DIAGNOSIS — K21.00 GASTROESOPHAGEAL REFLUX DISEASE WITH ESOPHAGITIS, UNSPECIFIED WHETHER HEMORRHAGE: ICD-10-CM

## 2025-02-11 DIAGNOSIS — G89.29 CHRONIC BILATERAL LOW BACK PAIN WITH RIGHT-SIDED SCIATICA: ICD-10-CM

## 2025-02-11 DIAGNOSIS — S06.5XAA SUBDURAL HEMATOMA (H): ICD-10-CM

## 2025-02-11 PROCEDURE — 99309 SBSQ NF CARE MODERATE MDM 30: CPT | Performed by: NURSE PRACTITIONER

## 2025-02-11 RX ORDER — PANTOPRAZOLE SODIUM 40 MG/1
40 TABLET, DELAYED RELEASE ORAL 2 TIMES DAILY
Status: SHIPPED
Start: 2025-02-11

## 2025-02-11 RX ORDER — BUMETANIDE 1 MG/1
2 TABLET ORAL DAILY
Status: SHIPPED
Start: 2025-02-11

## 2025-02-11 NOTE — LETTER
2/11/2025      Luz Elena Sampson  Attn Parmjit Sampson  [address]  [facility] [address]        M [facility] GERIATRICS    Chief Complaint   Patient presents with     RECHECK     HPI:  Luz Elena Sampson is a 84 year old  (1940), who is being seen today for an episodic care visit at: [facility] AT [facility]) [[id number]]. Today's concern is:   1. Chronic congestive heart failure, unspecified heart failure type (H)    2. Chronic bilateral low back pain with right-sided sciatica    3. Gastroesophageal reflux disease with esophagitis, unspecified whether hemorrhage    4. Subdural hematoma (H)      Patient seen for follow up, chronic CHF and currently mild hypervolemia with leg edema 3-4+, weight gain approx 5lbs since admission, able to ambulate with assist, BNP 4110, K 4.2, also post SDH and now Hgb 8.8 versus norm 11-14, asymptomatic, neuron intact, appears healthy, oriented.    Allergies, and PMH/PSH reviewed in EPIC today.  REVIEW OF SYSTEMS:  4 point ROS including Respiratory, CV, GI and , other than that noted in the HPI,  is negative    Objective:   /67   Pulse 56   Temp 97.2  F (36.2  C)   Resp 18   Wt 110.7 kg (244 lb)   SpO2 99%   BMI 34.03 kg/m    GENERAL APPEARANCE:  in no distress, appears healthy  ENT:  Mouth and posterior oropharynx normal, moist mucous membranes  RESP:  lungs clear to auscultation , no respiratory distress  CV:  peripheral edema 3-4+ in legs, pedal, rate-normal  ABDOMEN:  bowel sounds normal  M/S:   Gait and station abnormal unsteady  SKIN:  Inspection of skin and subcutaneous tissue baseline  NEURO:   Examination of sensation by touch normal  PSYCH:  affect and mood normal    Labs done in SNF are in [facility]. Please refer to them using [employer]/Care Everywhere.    Assessment/Plan:  (I50.9) Chronic congestive heart failure, unspecified heart failure type (H)  (primary encounter diagnosis)  (M54.41,  G89.29) Chronic bilateral low back pain with right-sided  sciatica  Comment: mild exacerbation, BNP 4110, edema +  Plan:   -increase bumex to 2mg daily  -lymph eval legs with massage and wraps  -elevate legs as possible  -repeat BMP, BNP in 1 week    (K21.00) Gastroesophageal reflux disease with esophagitis, unspecified whether hemorrhage  (S06.5XAA) Subdural hematoma (H)  Comment: Hgb low 8.8, norm 11-14, no overt s/s bleed  Plan:   -discontinue omeprazole  -start pantoprazole 40mg BID  -repeat Hgb 1 week  -FOBT ordered, results pending  -continue Fe supplement  -xarelto already DC'd    MED REC REQUIRED  Post Medication Reconciliation Status: medication reconcilation previously completed during another office visit          Electronically signed by: JITENDRA Freitas CNP          Sincerely,        JITENDRA Freitas CNP    Electronically signed

## 2025-02-11 NOTE — PROGRESS NOTES
[facility] GERIATRICS    Chief Complaint   Patient presents with    RECHECK     HPI:  Luz Elena Sampson is a 84 year old  (1940), who is being seen today for an episodic care visit at: [facility] AT [facility] ([facility]) [[id number]]. Today's concern is:   1. Chronic congestive heart failure, unspecified heart failure type (H)    2. Chronic bilateral low back pain with right-sided sciatica    3. Gastroesophageal reflux disease with esophagitis, unspecified whether hemorrhage    4. Subdural hematoma (H)      Patient seen for follow up, chronic CHF and currently mild hypervolemia with leg edema 3-4+, weight gain approx 5lbs since admission, able to ambulate with assist, BNP 4110, K 4.2, also post SDH and now Hgb 8.8 versus norm 11-14, asymptomatic, neuron intact, appears healthy, oriented.    Allergies, and PMH/PSH reviewed in EPIC today.  REVIEW OF SYSTEMS:  4 point ROS including Respiratory, CV, GI and , other than that noted in the HPI,  is negative    Objective:   /67   Pulse 56   Temp 97.2  F (36.2  C)   Resp 18   Wt 110.7 kg (244 lb)   SpO2 99%   BMI 34.03 kg/m    GENERAL APPEARANCE:  in no distress, appears healthy  ENT:  Mouth and posterior oropharynx normal, moist mucous membranes  RESP:  lungs clear to auscultation , no respiratory distress  CV:  peripheral edema 3-4+ in legs, pedal, rate-normal  ABDOMEN:  bowel sounds normal  M/S:   Gait and station abnormal unsteady  SKIN:  Inspection of skin and subcutaneous tissue baseline  NEURO:   Examination of sensation by touch normal  PSYCH:  affect and mood normal    Labs done in SNF are in [facility]. Please refer to them using [facility]/Care Everywhere.    Assessment/Plan:  (I50.9) Chronic congestive heart failure, unspecified heart failure type (H)  (primary encounter diagnosis)  (M54.41,  G89.29) Chronic bilateral low back pain with right-sided sciatica  Comment: mild exacerbation, BNP 4110, edema +  Plan:   -increase bumex to 2mg daily  -lymph eval  legs with massage and wraps  -elevate legs as possible  -repeat BMP, BNP in 1 week    (K21.00) Gastroesophageal reflux disease with esophagitis, unspecified whether hemorrhage  (S06.5XAA) Subdural hematoma (H)  Comment: Hgb low 8.8, norm 11-14, no overt s/s bleed  Plan:   -discontinue omeprazole  -start pantoprazole 40mg BID  -repeat Hgb 1 week  -FOBT ordered, results pending  -continue Fe supplement  -xarelto already DC'd    MED REC REQUIRED  Post Medication Reconciliation Status: medication reconcilation previously completed during another office visit          Electronically signed by: JITENDRA Freitas CNP

## 2025-02-14 PROBLEM — D64.9 ANEMIA, UNSPECIFIED TYPE: Status: ACTIVE | Noted: 2025-02-14

## 2025-02-15 ENCOUNTER — ORDERS ONLY (AUTO-RELEASED) (OUTPATIENT)
Dept: CARDIOLOGY | Facility: CLINIC | Age: 85
End: 2025-02-15
Payer: MEDICARE

## 2025-02-15 DIAGNOSIS — I48.19 ATRIAL FIBRILLATION, PERSISTENT (H): ICD-10-CM

## 2025-02-17 ENCOUNTER — LAB REQUISITION (OUTPATIENT)
Dept: LAB | Facility: CLINIC | Age: 85
End: 2025-02-17
Payer: MEDICARE

## 2025-02-17 DIAGNOSIS — D64.9 ANEMIA, UNSPECIFIED: ICD-10-CM

## 2025-02-17 LAB
ANION GAP SERPL CALCULATED.3IONS-SCNC: 13 MMOL/L (ref 7–15)
BUN SERPL-MCNC: 19.6 MG/DL (ref 8–23)
CALCIUM SERPL-MCNC: 9.1 MG/DL (ref 8.8–10.4)
CHLORIDE SERPL-SCNC: 96 MMOL/L (ref 98–107)
CREAT SERPL-MCNC: 1.6 MG/DL (ref 0.67–1.17)
EGFRCR SERPLBLD CKD-EPI 2021: 42 ML/MIN/1.73M2
GLUCOSE SERPL-MCNC: 103 MG/DL (ref 70–99)
HCO3 SERPL-SCNC: 22 MMOL/L (ref 22–29)
HGB BLD-MCNC: 10.4 G/DL (ref 13.3–17.7)
POTASSIUM SERPL-SCNC: 4.6 MMOL/L (ref 3.4–5.3)
SODIUM SERPL-SCNC: 131 MMOL/L (ref 135–145)

## 2025-02-17 PROCEDURE — 85018 HEMOGLOBIN: CPT | Performed by: NURSE PRACTITIONER

## 2025-02-17 PROCEDURE — 82310 ASSAY OF CALCIUM: CPT | Performed by: NURSE PRACTITIONER

## 2025-02-18 ENCOUNTER — TRANSITIONAL CARE UNIT VISIT (OUTPATIENT)
Dept: GERIATRICS | Facility: CLINIC | Age: 85
End: 2025-02-18
Payer: MEDICARE

## 2025-02-18 VITALS
BODY MASS INDEX: 34.1 KG/M2 | SYSTOLIC BLOOD PRESSURE: 164 MMHG | HEART RATE: 46 BPM | DIASTOLIC BLOOD PRESSURE: 86 MMHG | OXYGEN SATURATION: 96 % | TEMPERATURE: 97 F | WEIGHT: 243.6 LBS | RESPIRATION RATE: 18 BRPM | HEIGHT: 71 IN

## 2025-02-18 DIAGNOSIS — D64.9 ANEMIA, UNSPECIFIED TYPE: ICD-10-CM

## 2025-02-18 DIAGNOSIS — S06.5XAA SUBDURAL HEMATOMA (H): ICD-10-CM

## 2025-02-18 DIAGNOSIS — N18.32 CHRONIC KIDNEY DISEASE, STAGE 3B (H): Primary | ICD-10-CM

## 2025-02-18 PROCEDURE — 99309 SBSQ NF CARE MODERATE MDM 30: CPT | Performed by: NURSE PRACTITIONER

## 2025-02-18 NOTE — PROGRESS NOTES
"[facility] GERIATRICS    Chief Complaint   Patient presents with    RECHECK     HPI:  Luz Elena Sampson is a 84 year old  (1940), who is being seen today for an episodic care visit at: [facility] AT [facility] ([facility]) [[id number]]. Today's concern is:   1. Chronic kidney disease, stage 3b (H)    2. Subdural hematoma (H)    3. Anemia, unspecified type      Patient seen for follow up, labs 2/17 with creat 1.6, GFR 42, slightly worse in past week with increase in bumex due to weight gain and increased leg edema, post fall with SDH, neuro intact, face bruising improving, Hgb was 8.8 and FOBT negative and now Hgb up to 10.4, overall appears healthy.    Allergies, and PMH/PSH reviewed in [facility] today.  REVIEW OF SYSTEMS:  4 point ROS including Respiratory, CV, GI and , other than that noted in the HPI,  is negative    Objective:   BP (!) 164/86   Pulse (!) 46   Temp 97  F (36.1  C)   Resp 18   Ht 1.803 m (5' 11\")   Wt 110.5 kg (243 lb 9.6 oz)   SpO2 96%   BMI 33.98 kg/m    GENERAL APPEARANCE:  in no distress, appears healthy  ENT:  Mouth and posterior oropharynx normal, moist mucous membranes  RESP:  lungs clear to auscultation , no respiratory distress  CV:  peripheral edema 2+ in lower legs, rate-normal  ABDOMEN:  bowel sounds normal  M/S:   Gait and station abnormal unsteady  SKIN:  Inspection of skin and subcutaneous tissue baseline  NEURO:   Examination of sensation by touch normal  PSYCH:  affect and mood normal    Labs done in SNF are in [facility]. Please refer to them using [facility]/Care Everywhere.    Assessment/Plan:  (N18.32) Chronic kidney disease, stage 3b (H)  (primary encounter diagnosis)  Comment: creat 1.6, GFR 42  Plan:   -dose meds renally  -may need to reduce bumex dosing  -repeat BMP 1-2 weeks    (S06.5XAA) Subdural hematoma (H)  (D64.9) Anemia, unspecified type  Comment: post fall, SDH with neuro intact, Hgb up to 10/4  Plan:   -holding xarelto indefinitely  -PT/OT working with  -continue Fe " supplement, pantoprazole BID   -repeat Hgb with BMP order as above  -consider discontinue of Fe, thiamine, MVI/folic       MED REC REQUIRED  Post Medication Reconciliation Status: medication reconcilation previously completed during another office visit          Electronically signed by: JITENDRA Freitas CNP

## 2025-02-18 NOTE — LETTER
" 2/18/2025      Luz Elena Sampson  Attn Parmjit Sampson  [address]  [place] [address]        M [facility] GERIATRICS    Chief Complaint   Patient presents with     RECHECK     HPI:  Luz Elena Sampson is a 84 year old  (1940), who is being seen today for an episodic care visit at: [facility] AT [facility] ([facility]) [[id number]]. Today's concern is:   1. Chronic kidney disease, stage 3b (H)    2. Subdural hematoma (H)    3. Anemia, unspecified type      Patient seen for follow up, labs 2/17 with creat 1.6, GFR 42, slightly worse in past week with increase in bumex due to weight gain and increased leg edema, post fall with SDH, neuro intact, face bruising improving, Hgb was 8.8 and FOBT negative and now Hgb up to 10.4, overall appears healthy.    Allergies, and PMH/PSH reviewed in [facility] today.  REVIEW OF SYSTEMS:  4 point ROS including Respiratory, CV, GI and , other than that noted in the HPI,  is negative    Objective:   BP (!) 164/86   Pulse (!) 46   Temp 97  F (36.1  C)   Resp 18   Ht 1.803 m (5' 11\")   Wt 110.5 kg (243 lb 9.6 oz)   SpO2 96%   BMI 33.98 kg/m    GENERAL APPEARANCE:  in no distress, appears healthy  ENT:  Mouth and posterior oropharynx normal, moist mucous membranes  RESP:  lungs clear to auscultation , no respiratory distress  CV:  peripheral edema 2+ in lower legs, rate-normal  ABDOMEN:  bowel sounds normal  M/S:   Gait and station abnormal unsteady  SKIN:  Inspection of skin and subcutaneous tissue baseline  NEURO:   Examination of sensation by touch normal  PSYCH:  affect and mood normal    Labs done in SNF are in [facility]. Please refer to them using [facility]/Care Everywhere.    Assessment/Plan:  (N18.32) Chronic kidney disease, stage 3b (H)  (primary encounter diagnosis)  Comment: creat 1.6, GFR 42  Plan:   -dose meds renally  -may need to reduce bumex dosing  -repeat BMP 1-2 weeks    (S06.5XAA) Subdural hematoma (H)  (D64.9) Anemia, unspecified type  Comment: post fall, SDH with neuro " intact, Hgb up to 10/4  Plan:   -holding xarelto indefinitely  -PT/OT working with  -continue Fe supplement, pantoprazole BID   -repeat Hgb with BMP order as above  -consider discontinue of Fe, thiamine, MVI/folic       MED REC REQUIRED  Post Medication Reconciliation Status: medication reconcilation previously completed during another office visit          Electronically signed by: JITENDRA Freitas CNP       Sincerely,        JITENDRA Freitas CNP    Electronically signed

## 2025-02-24 ENCOUNTER — LAB REQUISITION (OUTPATIENT)
Dept: LAB | Facility: CLINIC | Age: 85
End: 2025-02-24
Payer: MEDICARE

## 2025-02-24 DIAGNOSIS — N18.30 CHRONIC KIDNEY DISEASE, STAGE 3 UNSPECIFIED (H): ICD-10-CM

## 2025-02-24 LAB
ANION GAP SERPL CALCULATED.3IONS-SCNC: 14 MMOL/L (ref 7–15)
BUN SERPL-MCNC: 19.4 MG/DL (ref 8–23)
CALCIUM SERPL-MCNC: 8.8 MG/DL (ref 8.8–10.4)
CHLORIDE SERPL-SCNC: 101 MMOL/L (ref 98–107)
CREAT SERPL-MCNC: 1.54 MG/DL (ref 0.67–1.17)
EGFRCR SERPLBLD CKD-EPI 2021: 44 ML/MIN/1.73M2
GLUCOSE SERPL-MCNC: 83 MG/DL (ref 70–99)
HCO3 SERPL-SCNC: 19 MMOL/L (ref 22–29)
POTASSIUM SERPL-SCNC: 4.4 MMOL/L (ref 3.4–5.3)
SODIUM SERPL-SCNC: 134 MMOL/L (ref 135–145)

## 2025-02-24 PROCEDURE — 80048 BASIC METABOLIC PNL TOTAL CA: CPT | Mod: ORL | Performed by: NURSE PRACTITIONER

## 2025-02-24 PROCEDURE — 84295 ASSAY OF SERUM SODIUM: CPT | Performed by: NURSE PRACTITIONER

## 2025-03-05 ENCOUNTER — ASSISTED LIVING VISIT (OUTPATIENT)
Dept: GERIATRICS | Facility: CLINIC | Age: 85
End: 2025-03-05
Payer: MEDICARE

## 2025-03-05 VITALS
HEART RATE: 55 BPM | RESPIRATION RATE: 17 BRPM | BODY MASS INDEX: 35.84 KG/M2 | WEIGHT: 257 LBS | OXYGEN SATURATION: 99 % | SYSTOLIC BLOOD PRESSURE: 114 MMHG | TEMPERATURE: 97.5 F | DIASTOLIC BLOOD PRESSURE: 65 MMHG

## 2025-03-05 DIAGNOSIS — Z53.9 DIAGNOSIS NOT YET DEFINED: Primary | ICD-10-CM

## 2025-03-05 DIAGNOSIS — S06.5XAA SUBDURAL HEMATOMA (H): Primary | ICD-10-CM

## 2025-03-05 DIAGNOSIS — I50.9 CHRONIC CONGESTIVE HEART FAILURE, UNSPECIFIED HEART FAILURE TYPE (H): ICD-10-CM

## 2025-03-05 DIAGNOSIS — R60.0 BILATERAL LEG EDEMA: ICD-10-CM

## 2025-03-05 PROCEDURE — G0180 MD CERTIFICATION HHA PATIENT: HCPCS | Mod: 59 | Performed by: NURSE PRACTITIONER

## 2025-03-05 PROCEDURE — 99349 HOME/RES VST EST MOD MDM 40: CPT | Performed by: NURSE PRACTITIONER

## 2025-03-05 NOTE — PROGRESS NOTES
[facility] GERIATRICS    Chief Complaint   Patient presents with    RECHECK     HPI:  Luz Elena Sampson is a 84 year old  (1940), who is being seen today for an episodic care visit at: [facility] ([facility]) [[id number]]. Today's concern is:   1. Subdural hematoma (H)    2. Chronic congestive heart failure, unspecified heart failure type (H)    3. Bilateral leg edema      Patient seen for follow up, oriented, post fall with SDH, had hospital and TCU stay, denies headache, misc bruising, taken off xarelto, also mild weight increase along with leg edema, having some SOB in TCU and bumex increased but then creat increased to 1.6, has lymph wraps in place with edema 2+, ambulatory, overall appears healthy.    Allergies, and PMH/PSH reviewed in EPIC today.  REVIEW OF SYSTEMS:  4 point ROS including Respiratory, CV, GI and , other than that noted in the HPI,  is negative    Objective:   /65   Pulse 55   Temp 97.5  F (36.4  C)   Resp 17   Wt 116.6 kg (257 lb)   SpO2 99%   BMI 35.84 kg/m    GENERAL APPEARANCE:  in no distress, appears healthy  ENT:  Mouth and posterior oropharynx normal, moist mucous membranes  RESP:  lungs clear to auscultation , no respiratory distress  CV:  peripheral edema 2+ in lower legs, rate-normal  ABDOMEN:  bowel sounds normal  M/S:   Gait and station abnormal using walker  SKIN:  Inspection of skin and subcutaneous tissue baseline  NEURO:   Examination of sensation by touch normal  PSYCH:  affect and mood normal    Labs done in SNF are in [facility]. Please refer to them using [facility]/Care Everywhere.    Assessment/Plan:  (S06.5XAA) Subdural hematoma (H)  (primary encounter diagnosis)  Comment: fall with hospital stay 2/1-6 then TCU  Plan:   -xarelto Dc'd, no plan to restart due to fall risk  -follow up neuro with CT on 3/6    (I50.9) Chronic congestive heart failure, unspecified heart failure type (H)  (R60.0) Bilateral leg edema  Comment: mild  hypervolemia, difficult to balance BP with edema and creat  Plan:   -continue lymph wraps via home care  -continue bumex reduced to 1mg daily  -elevate legs as possible  -CBC, BPM, BNP on 3/11    MED REC REQUIRED  Post Medication Reconciliation Status: medication reconcilation previously completed during another office visit      Electronically signed by: JITENDRA Freitas CNP

## 2025-03-05 NOTE — LETTER
3/5/2025      Luz Elena Sampson  Attn Parmjit Sampson  [address]  [place] [address]        M [facility] GERIATRICS    Chief Complaint   Patient presents with     RECHECK     HPI:  Luz Elena Sampson is a 84 year old  (1940), who is being seen today for an episodic care visit at: [facility] ([facility] of [facility]) [[id number]]. Today's concern is:   1. Subdural hematoma (H)    2. Chronic congestive heart failure, unspecified heart failure type (H)    3. Bilateral leg edema      Patient seen for follow up, oriented, post fall with SDH, had hospital and TCU stay, denies headache, misc bruising, taken off xarelto, also mild weight increase along with leg edema, having some SOB in TCU and bumex increased but then creat increased to 1.6, has lymph wraps in place with edema 2+, ambulatory, overall appears healthy.    Allergies, and PMH/PSH reviewed in EPIC today.  REVIEW OF SYSTEMS:  4 point ROS including Respiratory, CV, GI and , other than that noted in the HPI,  is negative    Objective:   /65   Pulse 55   Temp 97.5  F (36.4  C)   Resp 17   Wt 116.6 kg (257 lb)   SpO2 99%   BMI 35.84 kg/m    GENERAL APPEARANCE:  in no distress, appears healthy  ENT:  Mouth and posterior oropharynx normal, moist mucous membranes  RESP:  lungs clear to auscultation , no respiratory distress  CV:  peripheral edema 2+ in lower legs, rate-normal  ABDOMEN:  bowel sounds normal  M/S:   Gait and station abnormal using walker  SKIN:  Inspection of skin and subcutaneous tissue baseline  NEURO:   Examination of sensation by touch normal  PSYCH:  affect and mood normal    Labs done in SNF are in [facility]. Please refer to them using [employer]/Care Everywhere.    Assessment/Plan:  (S06.5XAA) Subdural hematoma (H)  (primary encounter diagnosis)  Comment: fall with hospital stay 2/1-6 then TCU  Plan:   -xarelto Dc'd, no plan to restart due to fall risk  -follow up neuro with CT on 3/6    (I50.9) Chronic congestive heart  failure, unspecified heart failure type (H)  (R60.0) Bilateral leg edema  Comment: mild hypervolemia, difficult to balance BP with edema and creat  Plan:   -continue lymph wraps via home care  -continue bumex reduced to 1mg daily  -elevate legs as possible  -CBC, BPM, BNP on 3/11    MED REC REQUIRED  Post Medication Reconciliation Status: medication reconcilation previously completed during another office visit      Electronically signed by: JITENDRA Freitas CNP          Sincerely,        JITENDRA Freitas CNP    Electronically signed

## 2025-03-08 ENCOUNTER — HEALTH MAINTENANCE LETTER (OUTPATIENT)
Age: 85
End: 2025-03-08

## 2025-03-09 ENCOUNTER — LAB REQUISITION (OUTPATIENT)
Dept: LAB | Facility: CLINIC | Age: 85
End: 2025-03-09
Payer: MEDICARE

## 2025-03-09 DIAGNOSIS — I50.9 HEART FAILURE, UNSPECIFIED (H): ICD-10-CM

## 2025-03-09 DIAGNOSIS — D64.9 ANEMIA, UNSPECIFIED: ICD-10-CM

## 2025-03-09 DIAGNOSIS — M18.9 OSTEOARTHRITIS OF FIRST CARPOMETACARPAL JOINT, UNSPECIFIED: ICD-10-CM

## 2025-03-11 LAB
ERYTHROCYTE [DISTWIDTH] IN BLOOD BY AUTOMATED COUNT: 14.7 % (ref 10–15)
GLUCOSE SERPL-MCNC: 77 MG/DL (ref 70–99)
HCT VFR BLD AUTO: 34.4 % (ref 40–53)
HGB BLD-MCNC: 10.7 G/DL (ref 13.3–17.7)
MCH RBC QN AUTO: 27.4 PG (ref 26.5–33)
MCHC RBC AUTO-ENTMCNC: 31.1 G/DL (ref 31.5–36.5)
MCV RBC AUTO: 88 FL (ref 78–100)
PLATELET # BLD AUTO: 167 10E3/UL (ref 150–450)
RBC # BLD AUTO: 3.9 10E6/UL (ref 4.4–5.9)
WBC # BLD AUTO: 6.2 10E3/UL (ref 4–11)

## 2025-03-11 PROCEDURE — P9603 ONE-WAY ALLOW PRORATED MILES: HCPCS | Mod: ORL | Performed by: NURSE PRACTITIONER

## 2025-03-11 PROCEDURE — 36415 COLL VENOUS BLD VENIPUNCTURE: CPT | Mod: ORL | Performed by: NURSE PRACTITIONER

## 2025-03-11 PROCEDURE — 83880 ASSAY OF NATRIURETIC PEPTIDE: CPT | Mod: ORL | Performed by: NURSE PRACTITIONER

## 2025-03-11 PROCEDURE — 85027 COMPLETE CBC AUTOMATED: CPT | Mod: ORL | Performed by: NURSE PRACTITIONER

## 2025-03-11 PROCEDURE — 80048 BASIC METABOLIC PNL TOTAL CA: CPT | Mod: ORL | Performed by: NURSE PRACTITIONER

## 2025-03-12 ENCOUNTER — ASSISTED LIVING VISIT (OUTPATIENT)
Dept: GERIATRICS | Facility: CLINIC | Age: 85
End: 2025-03-12
Payer: MEDICARE

## 2025-03-12 VITALS
DIASTOLIC BLOOD PRESSURE: 84 MMHG | OXYGEN SATURATION: 95 % | BODY MASS INDEX: 35.01 KG/M2 | SYSTOLIC BLOOD PRESSURE: 158 MMHG | RESPIRATION RATE: 16 BRPM | HEART RATE: 63 BPM | TEMPERATURE: 97.4 F | WEIGHT: 251 LBS

## 2025-03-12 DIAGNOSIS — E66.812 CLASS 2 SEVERE OBESITY DUE TO EXCESS CALORIES WITH SERIOUS COMORBIDITY AND BODY MASS INDEX (BMI) OF 35.0 TO 35.9 IN ADULT (H): Primary | ICD-10-CM

## 2025-03-12 DIAGNOSIS — I50.9 CHRONIC CONGESTIVE HEART FAILURE, UNSPECIFIED HEART FAILURE TYPE (H): ICD-10-CM

## 2025-03-12 DIAGNOSIS — E66.01 CLASS 2 SEVERE OBESITY DUE TO EXCESS CALORIES WITH SERIOUS COMORBIDITY AND BODY MASS INDEX (BMI) OF 35.0 TO 35.9 IN ADULT (H): Primary | ICD-10-CM

## 2025-03-12 DIAGNOSIS — R60.0 BILATERAL LEG EDEMA: ICD-10-CM

## 2025-03-12 PROBLEM — G20.A1 PARKINSON'S DISEASE WITHOUT DYSKINESIA OR FLUCTUATING MANIFESTATIONS (H): Status: RESOLVED | Noted: 2024-12-04 | Resolved: 2025-03-12

## 2025-03-12 LAB — NT-PROBNP SERPL-MCNC: 4141 PG/ML (ref 0–1800)

## 2025-03-12 PROCEDURE — 99349 HOME/RES VST EST MOD MDM 40: CPT | Performed by: NURSE PRACTITIONER

## 2025-03-12 NOTE — PROGRESS NOTES
[facility] GERIATRICS    Chief Complaint   Patient presents with    RECHECK     HPI:  Luz Elena Sampson is a 84 year old  (1940), who is being seen today for an episodic care visit at: [facility] ([facility]) [[id number]]. Today's concern is:   1. Class 2 severe obesity due to excess calories with serious comorbidity and body mass index (BMI) of 35.0 to 35.9 in adult (H)    2. Chronic congestive heart failure, unspecified heart failure type (H)    3. Bilateral leg edema      Patient seen for follow up, weight 252lbs, normal average 245lbs, BNP 4141, GFR 44, leg edema 3-4+ without compression on, attempts to balance renal, BP, weight, edema and CHF proving difficult, has moderate appetite, legs dependent much of waking hours, has newer velcro wraps but not wearing, considering edema legs may be close to weeping, BP today 97/57, HR 58, no longer on AC due to recent SDH, overall oriented and healthy.    Allergies, and PMH/PSH reviewed in EPIC today.  REVIEW OF SYSTEMS:  4 point ROS including Respiratory, CV, GI and , other than that noted in the HPI,  is negative    Objective:   BP (!) 158/84   Pulse 63   Temp 97.4  F (36.3  C)   Resp 16   Wt 113.9 kg (251 lb)   SpO2 95%   BMI 35.01 kg/m    GENERAL APPEARANCE:  in no distress, appears healthy  ENT:  Mouth and posterior oropharynx normal, moist mucous membranes  RESP:  lungs clear to auscultation , no respiratory distress  CV:  peripheral edema 3-4+ in lower legs, rate-normal  ABDOMEN:  bowel sounds normal  M/S:   Gait and station abnormal unsteady  SKIN:  Inspection of skin and subcutaneous tissue baseline  NEURO:   Examination of sensation by touch normal  PSYCH:  affect and mood normal    Labs done in SNF are in [facility]. Please refer to them using [employer]/Care Everywhere.    Assessment/Plan:  (E66.812,  E66.01,  Z68.35) Class 2 severe obesity due to excess calories with serious comorbidity and body mass index (BMI) of 35.0 to  35.9 in adult (H)  (primary encounter diagnosis)  (I50.9) Chronic congestive heart failure, unspecified heart failure type (H)  (R60.0) Bilateral leg edema  Comment: weight up 7lbs, BMI 35.01, edema 3-4+, soft BP's, BNP 4141  Plan:   -PT/OT working with  -lymph therapy wrapping legs 2-3x/wk  -continue bumex 1mg  -BMP pending results today  -elevate legs as possible  -consider portion controls  -weights monthly  -vitals PRN  -plan to follow up RE: status, edema, BP's in 1-2 weeks    MED REC REQUIRED  Post Medication Reconciliation Status: medication reconcilation previously completed during another office visit        Electronically signed by: JITENDRA Freitas CNP

## 2025-03-12 NOTE — LETTER
3/12/2025      Luz Elena Sampson  Attn Parmjit Sampson  [address]  [place] [address]        M [facility] GERIATRICS    Chief Complaint   Patient presents with     RECHECK     HPI:  Luz Elena Sampson is a 84 year old  (1940), who is being seen today for an episodic care visit at: [facility] ([facility]) [[id number]]. Today's concern is:   1. Class 2 severe obesity due to excess calories with serious comorbidity and body mass index (BMI) of 35.0 to 35.9 in adult (H)    2. Chronic congestive heart failure, unspecified heart failure type (H)    3. Bilateral leg edema      Patient seen for follow up, weight 252lbs, normal average 245lbs, BNP 4141, GFR 44, leg edema 3-4+ without compression on, attempts to balance renal, BP, weight, edema and CHF proving difficult, has moderate appetite, legs dependent much of waking hours, has newer velcro wraps but not wearing, considering edema legs may be close to weeping, BP today 97/57, HR 58, no longer on AC due to recent SDH, overall oriented and healthy.    Allergies, and PMH/PSH reviewed in EPIC today.  REVIEW OF SYSTEMS:  4 point ROS including Respiratory, CV, GI and , other than that noted in the HPI,  is negative    Objective:   BP (!) 158/84   Pulse 63   Temp 97.4  F (36.3  C)   Resp 16   Wt 113.9 kg (251 lb)   SpO2 95%   BMI 35.01 kg/m    GENERAL APPEARANCE:  in no distress, appears healthy  ENT:  Mouth and posterior oropharynx normal, moist mucous membranes  RESP:  lungs clear to auscultation , no respiratory distress  CV:  peripheral edema 3-4+ in lower legs, rate-normal  ABDOMEN:  bowel sounds normal  M/S:   Gait and station abnormal unsteady  SKIN:  Inspection of skin and subcutaneous tissue baseline  NEURO:   Examination of sensation by touch normal  PSYCH:  affect and mood normal    Labs done in SNF are in [place] [facility]. Please refer to them using EPIC/Care Everywhere.    Assessment/Plan:  (E66.812,  E66.01,  Z68.35) Class 2 severe  obesity due to excess calories with serious comorbidity and body mass index (BMI) of 35.0 to 35.9 in adult (H)  (primary encounter diagnosis)  (I50.9) Chronic congestive heart failure, unspecified heart failure type (H)  (R60.0) Bilateral leg edema  Comment: weight up 7lbs, BMI 35.01, edema 3-4+, soft BP's, BNP 4141  Plan:   -PT/OT working with  -lymph therapy wrapping legs 2-3x/wk  -continue bumex 1mg  -BMP pending results today  -elevate legs as possible  -consider portion controls  -weights monthly  -vitals PRN  -plan to follow up RE: status, edema, BP's in 1-2 weeks    MED REC REQUIRED  Post Medication Reconciliation Status: medication reconcilation previously completed during another office visit        Electronically signed by: JITENDRA Freitas CNP          Sincerely,        JITENDRA Freitas CNP    Electronically signed

## 2025-03-13 LAB
ANION GAP SERPL CALCULATED.3IONS-SCNC: 9 MMOL/L (ref 7–15)
BUN SERPL-MCNC: 21 MG/DL (ref 8–23)
CALCIUM SERPL-MCNC: ABNORMAL MG/DL
CHLORIDE SERPL-SCNC: 98 MMOL/L (ref 98–107)
CREAT SERPL-MCNC: 1.51 MG/DL (ref 0.67–1.17)
EGFRCR SERPLBLD CKD-EPI 2021: 45 ML/MIN/1.73M2
HCO3 SERPL-SCNC: 25 MMOL/L (ref 22–29)
Lab: NORMAL
PERFORMING LABORATORY: NORMAL
POTASSIUM SERPL-SCNC: 4.4 MMOL/L (ref 3.4–5.3)
SODIUM SERPL-SCNC: 132 MMOL/L (ref 135–145)
SPECIMEN STATUS: NORMAL
TEST NAME: NORMAL

## 2025-03-15 LAB — MISCELLANEOUS TEST 1 (ARUP): NORMAL

## 2025-03-19 ENCOUNTER — ASSISTED LIVING VISIT (OUTPATIENT)
Dept: GERIATRICS | Facility: CLINIC | Age: 85
End: 2025-03-19
Payer: MEDICARE

## 2025-03-19 VITALS
TEMPERATURE: 97.4 F | WEIGHT: 251 LBS | BODY MASS INDEX: 35.01 KG/M2 | HEART RATE: 63 BPM | RESPIRATION RATE: 16 BRPM | OXYGEN SATURATION: 95 % | DIASTOLIC BLOOD PRESSURE: 84 MMHG | SYSTOLIC BLOOD PRESSURE: 158 MMHG

## 2025-03-19 DIAGNOSIS — I10 HYPERTENSION, UNSPECIFIED TYPE: ICD-10-CM

## 2025-03-19 DIAGNOSIS — I50.9 CHRONIC CONGESTIVE HEART FAILURE, UNSPECIFIED HEART FAILURE TYPE (H): Primary | ICD-10-CM

## 2025-03-19 DIAGNOSIS — N18.32 CHRONIC KIDNEY DISEASE, STAGE 3B (H): ICD-10-CM

## 2025-03-19 DIAGNOSIS — R60.0 BILATERAL LEG EDEMA: ICD-10-CM

## 2025-03-19 PROCEDURE — 99349 HOME/RES VST EST MOD MDM 40: CPT | Performed by: NURSE PRACTITIONER

## 2025-03-19 RX ORDER — BUMETANIDE 1 MG/1
2 TABLET ORAL DAILY
Status: SHIPPED
Start: 2025-03-19

## 2025-03-19 NOTE — PROGRESS NOTES
[facility] GERIATRICS    Chief Complaint   Patient presents with    RECHECK     HPI:  Luz Elena Sampson is a 84 year old  (1940), who is being seen today for an episodic care visit at: [facility] ([facility]) [[id number]]. Today's concern is:    1. Chronic congestive heart failure, unspecified heart failure type (H)    2. Bilateral leg edema    3. Chronic kidney disease, stage 3b (H)    4. Hypertension, unspecified type      Patient seen for follow up, also met with lymph therapist RE: status and plan, weight gain from 245 to 267lbs in past month, leg edema 3+ firm and encroaching into posterior thighs and distal buttocks, creat 1.51, SBP's 100 range, increased weight, fatigue and edema, lymph wraps in place, recent BNP 4141, appears healthy, denies pain, reports feeling 'OK'.    Allergies, and PMH/PSH reviewed in EPIC today.  REVIEW OF SYSTEMS:  4 point ROS including Respiratory, CV, GI and , other than that noted in the HPI,  is negative    Objective:   BP (!) 158/84   Pulse 63   Temp 97.4  F (36.3  C)   Resp 16   Wt 113.9 kg (251 lb)   SpO2 95%   BMI 35.01 kg/m    GENERAL APPEARANCE:  in no distress, appears healthy  ENT:  Mouth and posterior oropharynx normal, moist mucous membranes  RESP:  lungs clear to auscultation , no respiratory distress  CV:  peripheral edema 3+ in lower legs/pedal, rate-normal  ABDOMEN:  bowel sounds normal  M/S:   Gait and station abnormal unsteady  SKIN:  Inspection of skin and subcutaneous tissue baseline  NEURO:   Examination of sensation by touch normal  PSYCH:  affect and mood normal    Labs done in SNF are in [facility]. Please refer to them using EPIC/Care Everywhere.    Assessment/Plan:  (I50.9) Chronic congestive heart failure, unspecified heart failure type (H)  (primary encounter diagnosis)  (R60.0) Bilateral leg edema  (N18.32) Chronic kidney disease, stage 3b (H)  (I10) Hypertension, unspecified type  Comment: hypervolemic, edema 3+,  low BP, creat 1.51, BNP 4141  Plan:   -PT/OT working with  -increase bumex to 2mg  -BMP on 3/25  -lymph eval and treat  -lymph to add massage if able  -elevate legs as possible  -daily weights  -caution with probable low BP's/increased lasix  -may need to be diuresed in hospital if weight continues to increase    MED REC REQUIRED  Post Medication Reconciliation Status: medication reconcilation previously completed during another office visit        Electronically signed by: JITENDRA Freitas CNP

## 2025-03-19 NOTE — LETTER
3/19/2025      Luz Elena Sampson  Attn Parmjit Sampson  [address]  [place] [address]        M [facility] GERIATRICS    Chief Complaint   Patient presents with     RECHECK     HPI:  Luz Elena Sampson is a 84 year old  (1940), who is being seen today for an episodic care visit at: [facility] (S) [[id number]]. Today's concern is:    1. Chronic congestive heart failure, unspecified heart failure type (H)    2. Bilateral leg edema    3. Chronic kidney disease, stage 3b (H)    4. Hypertension, unspecified type      Patient seen for follow up, also met with lymph therapist RE: status and plan, weight gain from 245 to 267lbs in past month, leg edema 3+ firm and encroaching into posterior thighs and distal buttocks, creat 1.51, SBP's 100 range, increased weight, fatigue and edema, lymph wraps in place, recent BNP 4141, appears healthy, denies pain, reports feeling 'OK'.    Allergies, and PMH/PSH reviewed in EPIC today.  REVIEW OF SYSTEMS:  4 point ROS including Respiratory, CV, GI and , other than that noted in the HPI,  is negative    Objective:   BP (!) 158/84   Pulse 63   Temp 97.4  F (36.3  C)   Resp 16   Wt 113.9 kg (251 lb)   SpO2 95%   BMI 35.01 kg/m    GENERAL APPEARANCE:  in no distress, appears healthy  ENT:  Mouth and posterior oropharynx normal, moist mucous membranes  RESP:  lungs clear to auscultation , no respiratory distress  CV:  peripheral edema 3+ in lower legs/pedal, rate-normal  ABDOMEN:  bowel sounds normal  M/S:   Gait and station abnormal unsteady  SKIN:  Inspection of skin and subcutaneous tissue baseline  NEURO:   Examination of sensation by touch normal  PSYCH:  affect and mood normal    Labs done in SNF are in [place] EPIC. Please refer to them using EPIC/Care Everywhere.    Assessment/Plan:  (I50.9) Chronic congestive heart failure, unspecified heart failure type (H)  (primary encounter diagnosis)  (R60.0) Bilateral leg edema  (N18.32) Chronic kidney  disease, stage 3b (H)  (I10) Hypertension, unspecified type  Comment: hypervolemic, edema 3+, low BP, creat 1.51, BNP 4141  Plan:   -PT/OT working with  -increase bumex to 2mg  -BMP on 3/25  -lymph eval and treat  -lymph to add massage if able  -elevate legs as possible  -daily weights  -caution with probable low BP's/increased lasix  -may need to be diuresed in hospital if weight continues to increase    MED REC REQUIRED  Post Medication Reconciliation Status: medication reconcilation previously completed during another office visit        Electronically signed by: JITENDRA Freitas CNP          Sincerely,        JITENDRA Freitas CNP    Electronically signed

## 2025-03-20 ENCOUNTER — LAB REQUISITION (OUTPATIENT)
Dept: LAB | Facility: CLINIC | Age: 85
End: 2025-03-20
Payer: MEDICARE

## 2025-03-20 DIAGNOSIS — I50.9 HEART FAILURE, UNSPECIFIED (H): ICD-10-CM

## 2025-03-20 DIAGNOSIS — N18.9 CHRONIC KIDNEY DISEASE, UNSPECIFIED: ICD-10-CM

## 2025-03-25 LAB
ANION GAP SERPL CALCULATED.3IONS-SCNC: 11 MMOL/L (ref 7–15)
BUN SERPL-MCNC: 14 MG/DL (ref 8–23)
CALCIUM SERPL-MCNC: 8.5 MG/DL (ref 8.8–10.4)
CHLORIDE SERPL-SCNC: 95 MMOL/L (ref 98–107)
CREAT SERPL-MCNC: 1.34 MG/DL (ref 0.67–1.17)
EGFRCR SERPLBLD CKD-EPI 2021: 52 ML/MIN/1.73M2
GLUCOSE SERPL-MCNC: 63 MG/DL (ref 70–99)
HCO3 SERPL-SCNC: 23 MMOL/L (ref 22–29)
NT-PROBNP SERPL-MCNC: 4008 PG/ML (ref 0–1800)
POTASSIUM SERPL-SCNC: 4 MMOL/L (ref 3.4–5.3)
SODIUM SERPL-SCNC: 129 MMOL/L (ref 135–145)

## 2025-03-25 PROCEDURE — 80048 BASIC METABOLIC PNL TOTAL CA: CPT | Mod: ORL | Performed by: NURSE PRACTITIONER

## 2025-03-25 PROCEDURE — 36415 COLL VENOUS BLD VENIPUNCTURE: CPT | Mod: ORL | Performed by: NURSE PRACTITIONER

## 2025-03-25 PROCEDURE — 83880 ASSAY OF NATRIURETIC PEPTIDE: CPT | Mod: ORL | Performed by: NURSE PRACTITIONER

## 2025-03-25 PROCEDURE — P9604 ONE-WAY ALLOW PRORATED TRIP: HCPCS | Mod: ORL | Performed by: NURSE PRACTITIONER

## 2025-03-26 ENCOUNTER — ASSISTED LIVING VISIT (OUTPATIENT)
Dept: GERIATRICS | Facility: CLINIC | Age: 85
End: 2025-03-26
Payer: MEDICARE

## 2025-03-26 VITALS
TEMPERATURE: 97.4 F | SYSTOLIC BLOOD PRESSURE: 158 MMHG | WEIGHT: 251 LBS | DIASTOLIC BLOOD PRESSURE: 84 MMHG | BODY MASS INDEX: 35.01 KG/M2 | RESPIRATION RATE: 16 BRPM | HEART RATE: 63 BPM | OXYGEN SATURATION: 95 %

## 2025-03-26 DIAGNOSIS — I10 HYPERTENSION, UNSPECIFIED TYPE: ICD-10-CM

## 2025-03-26 DIAGNOSIS — I50.9 CHRONIC CONGESTIVE HEART FAILURE, UNSPECIFIED HEART FAILURE TYPE (H): Primary | ICD-10-CM

## 2025-03-26 DIAGNOSIS — N18.32 CHRONIC KIDNEY DISEASE, STAGE 3B (H): ICD-10-CM

## 2025-03-26 DIAGNOSIS — R60.0 BILATERAL LEG EDEMA: ICD-10-CM

## 2025-03-26 PROCEDURE — 99349 HOME/RES VST EST MOD MDM 40: CPT | Performed by: NURSE PRACTITIONER

## 2025-03-26 RX ORDER — BUMETANIDE 1 MG/1
1 TABLET ORAL DAILY
Status: SHIPPED
Start: 2025-03-26

## 2025-03-26 NOTE — PROGRESS NOTES
[facility] GERIATRICS    Chief Complaint   Patient presents with    RECHECK     HPI:  Luz Elena Sampson is a 84 year old  (1940), who is being seen today for an episodic care visit at: [facility] ([facility]) [[id number]]. Today's concern is:   1. Chronic congestive heart failure, unspecified heart failure type (H)    2. Chronic kidney disease, stage 3b (H)    3. Bilateral leg edema    4. Hypertension, unspecified type      Patient seen for follow up, labs 3/25 with BNP 4008, N 129, K 4.0, Creat 1.34, GFR 52, also SBP's in the 100-110 range and fall risk, also lower leg and pedal edema 4+ not improving, skin thierry and intact with no weeping, has lymph working with, thought is increase in bumex to 2mg  on 3/19 was not effective for reducing BNP nor leg edema but did reduce SBP's and renal status along with Na, overall appears fairly healthy, no distress, independent with most ADL's.    Allergies, and PMH/PSH reviewed in EPIC today.  REVIEW OF SYSTEMS:  4 point ROS including Respiratory, CV, GI and , other than that noted in the HPI,  is negative    Objective:   BP (!) 158/84   Pulse 63   Temp 97.4  F (36.3  C)   Resp 16   Wt 113.9 kg (251 lb)   SpO2 95%   BMI 35.01 kg/m    GENERAL APPEARANCE:  in no distress, appears healthy  ENT:  Mouth and posterior oropharynx normal, moist mucous membranes  RESP:  lungs clear to auscultation , no respiratory distress  CV:  peripheral edema 4+ in legs/feet  ABDOMEN:  bowel sounds normal  M/S:   Gait and station abnormal unsteady  SKIN:  Inspection of skin and subcutaneous tissue baseline  NEURO:   Examination of sensation by touch normal  PSYCH:  affect and mood normal    Labs done in SNF are in [facility]. Please refer to them using [employer]/Care Everywhere.    Assessment/Plan:  (I50.9) Chronic congestive heart failure, unspecified heart failure type (H)  (primary encounter diagnosis)  (N18.32) Chronic kidney disease, stage 3b (H)  (R60.0)  Bilateral leg edema  (I10) Hypertension, unspecified type  Comment: labs as above, no distress, lungs clear, afebrile, denies CP  Plan:   -decrease bumex back to 1mg  -BNP, BMP and CBC on 4/15  -monitor vitals  -lymph therapy working with, leg wraps 2-3x/wk  -PT/OT working with but dislikes  -elevate legs as possible  -if status changes may need ER visit for evaluation, LTC may be better option due to multiple comorbidity and care needs      MED REC REQUIRED  Post Medication Reconciliation Status: medication reconcilation previously completed during another office visit        Electronically signed by: JITENDRA Freitas CNP

## 2025-03-26 NOTE — LETTER
3/26/2025      Luz Elena Sampson  Attn Parmjit Sampson  [address]  [place] [address]        M [facility] GERIATRICS    Chief Complaint   Patient presents with     RECHECK     HPI:  Luz Elena Sampson is a 84 year old  (1940), who is being seen today for an episodic care visit at: [facility] (S) [[id number]]. Today's concern is:   1. Chronic congestive heart failure, unspecified heart failure type (H)    2. Chronic kidney disease, stage 3b (H)    3. Bilateral leg edema    4. Hypertension, unspecified type      Patient seen for follow up, labs 3/25 with BNP 4008, N 129, K 4.0, Creat 1.34, GFR 52, also SBP's in the 100-110 range and fall risk, also lower leg and pedal edema 4+ not improving, skin thierry and intact with no weeping, has lymph working with, thought is increase in bumex to 2mg  on 3/19 was not effective for reducing BNP nor leg edema but did reduce SBP's and renal status along with Na, overall appears fairly healthy, no distress, independent with most ADL's.    Allergies, and PMH/PSH reviewed in EPIC today.  REVIEW OF SYSTEMS:  4 point ROS including Respiratory, CV, GI and , other than that noted in the HPI,  is negative    Objective:   BP (!) 158/84   Pulse 63   Temp 97.4  F (36.3  C)   Resp 16   Wt 113.9 kg (251 lb)   SpO2 95%   BMI 35.01 kg/m    GENERAL APPEARANCE:  in no distress, appears healthy  ENT:  Mouth and posterior oropharynx normal, moist mucous membranes  RESP:  lungs clear to auscultation , no respiratory distress  CV:  peripheral edema 4+ in legs/feet  ABDOMEN:  bowel sounds normal  M/S:   Gait and station abnormal unsteady  SKIN:  Inspection of skin and subcutaneous tissue baseline  NEURO:   Examination of sensation by touch normal  PSYCH:  affect and mood normal    Labs done in SNF are in [place] EPIC. Please refer to them using EPIC/Care Everywhere.    Assessment/Plan:  (I50.9) Chronic congestive heart failure, unspecified heart failure type  (H)  (primary encounter diagnosis)  (N18.32) Chronic kidney disease, stage 3b (H)  (R60.0) Bilateral leg edema  (I10) Hypertension, unspecified type  Comment: labs as above, no distress, lungs clear, afebrile, denies CP  Plan:   -decrease bumex back to 1mg  -BNP, BMP and CBC on 4/15  -monitor vitals  -lymph therapy working with, leg wraps 2-3x/wk  -PT/OT working with but dislikes  -elevate legs as possible  -if status changes may need ER visit for evaluation, LTC may be better option due to multiple comorbidity and care needs      MED REC REQUIRED  Post Medication Reconciliation Status: medication reconcilation previously completed during another office visit        Electronically signed by: JITENDRA Freitas CNP          Sincerely,        JITENDRA Freitas CNP    Electronically signed

## 2025-04-02 ENCOUNTER — ASSISTED LIVING VISIT (OUTPATIENT)
Dept: GERIATRICS | Facility: CLINIC | Age: 85
End: 2025-04-02
Payer: MEDICARE

## 2025-04-02 VITALS
HEART RATE: 63 BPM | WEIGHT: 251 LBS | RESPIRATION RATE: 16 BRPM | DIASTOLIC BLOOD PRESSURE: 84 MMHG | BODY MASS INDEX: 35.01 KG/M2 | TEMPERATURE: 97.4 F | SYSTOLIC BLOOD PRESSURE: 158 MMHG | OXYGEN SATURATION: 95 %

## 2025-04-02 DIAGNOSIS — R60.0 BILATERAL LEG EDEMA: ICD-10-CM

## 2025-04-02 DIAGNOSIS — I10 HYPERTENSION, UNSPECIFIED TYPE: ICD-10-CM

## 2025-04-02 DIAGNOSIS — I50.9 CHRONIC CONGESTIVE HEART FAILURE, UNSPECIFIED HEART FAILURE TYPE (H): Primary | ICD-10-CM

## 2025-04-02 PROCEDURE — 99349 HOME/RES VST EST MOD MDM 40: CPT | Performed by: NURSE PRACTITIONER

## 2025-04-02 NOTE — PROGRESS NOTES
[facility] GERIATRICS    Chief Complaint   Patient presents with    RECHECK     HPI:  Luz Elena Sampson is a 84 year old  (1940), who is being seen today for an episodic care visit at: [facility] ([facility]) [[id number]]. Today's concern is:   1. Chronic congestive heart failure, unspecified heart failure type (H)    2. Hypertension, unspecified type    3. Bilateral leg edema      Patient seen for follow up, recent BNP 4008, creat 1.34, /50, edema 2+, therapy done now and has velcro wraps on legs, decreased ambulation, lungs clear, no distress, renal and BP rebound but edema not improved after decrease in bumex.    Allergies, and PMH/PSH reviewed in EPIC today.  REVIEW OF SYSTEMS:  4 point ROS including Respiratory, CV, GI and , other than that noted in the HPI,  is negative    Objective:   BP (!) 158/84   Pulse 63   Temp 97.4  F (36.3  C)   Resp 16   Wt 113.9 kg (251 lb)   SpO2 95%   BMI 35.01 kg/m    GENERAL APPEARANCE:  in no distress, appears healthy  ENT:  Mouth and posterior oropharynx normal, moist mucous membranes  RESP:  lungs clear to auscultation , no respiratory distress  CV:  peripheral edema 2+ in lower legs/pedal, rate-normal  ABDOMEN:  bowel sounds normal  M/S:   Gait and station abnormal unsteady  SKIN:  Inspection of skin and subcutaneous tissue baseline  NEURO:   Examination of sensation by touch normal  PSYCH:  affect and mood normal    Labs done in SNF are in [facility]. Please refer to them using [facility]/Care Everywhere.    Assessment/Plan:  (I50.9) Chronic congestive heart failure, unspecified heart failure type (H)  (primary encounter diagnosis)  (I10) Hypertension, unspecified type  (R60.0) Bilateral leg edema  Comment: BNP 4008, denies CP, soft BP's, edema 2+, CKD3  Plan:   -continue bumex reduced to 1mg  -PT/OT completed  -velcro wraps on am off pm  -BMP, BNP on 4/15  -check vitals PRN  -encourage leg elevation and ambulation  -if edema worsens  again plan to restart lymph in-house    MED REC REQUIRED  Post Medication Reconciliation Status: medication reconcilation previously completed during another office visit        Electronically signed by: JITENDRA Freitas CNP

## 2025-04-02 NOTE — LETTER
4/2/2025      Luz Elena Sampson  Attn Parmjit Sampson  [address]  [place] [address]        [facility] GERIATRICS    Chief Complaint   Patient presents with     RECHECK     HPI:  Luz Elena Sampson is a 84 year old  (1940), who is being seen today for an episodic care visit at: [facility] ([facility]) [[id number]]. Today's concern is:   1. Chronic congestive heart failure, unspecified heart failure type (H)    2. Hypertension, unspecified type    3. Bilateral leg edema      Patient seen for follow up, recent BNP 4008, creat 1.34, /50, edema 2+, therapy done now and has velcro wraps on legs, decreased ambulation, lungs clear, no distress, renal and BP rebound but edema not improved after decrease in bumex.    Allergies, and PMH/PSH reviewed in EPIC today.  REVIEW OF SYSTEMS:  4 point ROS including Respiratory, CV, GI and , other than that noted in the HPI,  is negative    Objective:   BP (!) 158/84   Pulse 63   Temp 97.4  F (36.3  C)   Resp 16   Wt 113.9 kg (251 lb)   SpO2 95%   BMI 35.01 kg/m    GENERAL APPEARANCE:  in no distress, appears healthy  ENT:  Mouth and posterior oropharynx normal, moist mucous membranes  RESP:  lungs clear to auscultation , no respiratory distress  CV:  peripheral edema 2+ in lower legs/pedal, rate-normal  ABDOMEN:  bowel sounds normal  M/S:   Gait and station abnormal unsteady  SKIN:  Inspection of skin and subcutaneous tissue baseline  NEURO:   Examination of sensation by touch normal  PSYCH:  affect and mood normal    Labs done in SNF are in [facility]. Please refer to them using [employer]/Care Everywhere.    Assessment/Plan:  (I50.9) Chronic congestive heart failure, unspecified heart failure type (H)  (primary encounter diagnosis)  (I10) Hypertension, unspecified type  (R60.0) Bilateral leg edema  Comment: BNP 4008, denies CP, soft BP's, edema 2+, CKD3  Plan:   -continue bumex reduced to 1mg  -PT/OT completed  -velcro wraps on am off  pm  -BMP, BNP on 4/15  -check vitals PRN  -encourage leg elevation and ambulation  -if edema worsens again plan to restart lymph in-house    MED REC REQUIRED  Post Medication Reconciliation Status: medication reconcilation previously completed during another office visit        Electronically signed by: JITENDRA Freitas CNP          Sincerely,        JITENDRA Freitas CNP    Electronically signed

## 2025-04-08 ENCOUNTER — ANCILLARY PROCEDURE (OUTPATIENT)
Dept: CARDIOLOGY | Facility: CLINIC | Age: 85
End: 2025-04-08
Attending: INTERNAL MEDICINE
Payer: MEDICARE

## 2025-04-08 ENCOUNTER — OFFICE VISIT (OUTPATIENT)
Dept: CARDIOLOGY | Facility: CLINIC | Age: 85
End: 2025-04-08
Payer: MEDICARE

## 2025-04-08 VITALS
DIASTOLIC BLOOD PRESSURE: 70 MMHG | OXYGEN SATURATION: 99 % | HEART RATE: 59 BPM | SYSTOLIC BLOOD PRESSURE: 158 MMHG | WEIGHT: 265 LBS | BODY MASS INDEX: 36.96 KG/M2

## 2025-04-08 DIAGNOSIS — I35.0 SEVERE AORTIC VALVE STENOSIS: ICD-10-CM

## 2025-04-08 DIAGNOSIS — I48.19 ATRIAL FIBRILLATION, PERSISTENT (H): ICD-10-CM

## 2025-04-08 DIAGNOSIS — I48.21 PERMANENT ATRIAL FIBRILLATION (H): ICD-10-CM

## 2025-04-08 DIAGNOSIS — I50.9 CHRONIC CONGESTIVE HEART FAILURE, UNSPECIFIED HEART FAILURE TYPE (H): ICD-10-CM

## 2025-04-08 DIAGNOSIS — I50.30 HEART FAILURE WITH PRESERVED EJECTION FRACTION, UNSPECIFIED HF CHRONICITY (H): Primary | ICD-10-CM

## 2025-04-08 DIAGNOSIS — Z95.2 STATUS POST TRANSCATHETER AORTIC VALVE REPLACEMENT: ICD-10-CM

## 2025-04-08 LAB — LVEF ECHO: NORMAL

## 2025-04-08 PROCEDURE — 93306 TTE W/DOPPLER COMPLETE: CPT | Performed by: STUDENT IN AN ORGANIZED HEALTH CARE EDUCATION/TRAINING PROGRAM

## 2025-04-08 RX ORDER — BUMETANIDE 2 MG/1
2 TABLET ORAL DAILY
Qty: 90 TABLET | Refills: 3 | Status: SHIPPED | OUTPATIENT
Start: 2025-04-08

## 2025-04-08 RX ORDER — SPIRONOLACTONE 25 MG/1
12.5 TABLET ORAL DAILY
Qty: 45 TABLET | Refills: 3 | Status: SHIPPED | OUTPATIENT
Start: 2025-04-08

## 2025-04-08 NOTE — PATIENT INSTRUCTIONS
You were seen today in the Cardiovascular Clinic at the [facility] by:       JITENDRA EDUARDO CNP    Your visit summary and instructions are as follows:    You have a condition called Heart Failure with preserved Ejection Fraction that is causing you to have excess fluid buildup in your system.  Start spironolactone 12.5 mg daily  Increase Bumex to 2 mg daily  Resume Xarelto 15 mg daily  Recheck BMP labs and lipid panel in 2 weeks  [facility] cardiology pharmacist will call you about cost for heart failure medications (SGLT2 [Jardiance/Farxiga], Entresto)    Establish with [facility].     Thank you for your visit today!     Please MyChart message me or call my nurse if you have any questions or concerns.      During Business Hours:  [phone], option # 1 (University) then option # 4 (medical questions) and ask to speak with my nurse.     After hours, weekends or holidays:   [phone], Option #4  Ask to speak to the On-Call Cardiologist. Inform them you are a cardiology patient at the [facility].

## 2025-04-08 NOTE — PROGRESS NOTES
[facility]    Patient Name: Luz Elena Sampson   : 1940   Date of Visit: 2025  Primary Care Physician: JITENDRA Freitas CNP         Luz Elena Sampson is a pleasant 84 year old male, who presents for general follow up. Prior history significant for permanent atrial fibrillation with slow ventricular response, ischemic and valvular heart disease, peripheral arterial disease, hypertension, dyslipoproteinemia, stage III chronic kidney disease, obstructive sleep apnea, chronic venous insufficiency, hypothyroidism, and parkinsonism.     Cardiac History  - PCI at OSH 2021, with stent to proximal and mid LAD and balloon angioplasty of the diagonal   - aortic stenosis s/p bioprosthetic TAVR 10/2021    Last seen in clinic by Dr. Llamas on 12/10/24. No chest pain, shortness of breath, or dyspnea on activities of daily living. Did have chronic BLE edema with support wraps. He was started on lisinopril 5 mg daily for HTN.     Luz Elena was admitted to [facility] -25 after presenting with weakness, LE edema, cough, and URI symptoms, along with multiple falls over the past few weeks. He was found to have SDH and SAH and was admitted to the trauma team. He was diuresed with IV Bumex during hospitalization. Inpatient cardiology recommended oral Bumex 1 mg daily at the time of discharge, discussed with the patient about salt and fluid restriction. He was discharged on Zio patch to see if he has any symptomatic bradycardia. Orthostatic vitals were negative during hospital stay. His Xarelto was stopped at discharge until he was able to complete outpatient head CT with neuro follow up.    He saw outpatient cardiology at [facility] on 3/6, who recommended resuming Xarelto pending neuro visit. Patient saw outpatient neurology on 3/7. Head CT at follow up showed resolution of SDH and SAH with no further follow up needed at this time. Neuro recommended that if there was an alternative to DOAC, that may be preferred  with his history of falls and trauma.    Today in clinic, Luz Elena presents with his son. He denies SOB, but he coughs a lot per his son. Luz Elena says it is hard to know if he would feel short of breath with activity because he does not do much activity.     His mobility has been limited by his difficulties in maintaining balance related to his underlying Parkinson's disease. He uses a walker around the apartment and for short distances, but a motorized scooter for longer hallways and outside the apartment. Patient feels his activity is further limited by his ongoing edema.     They have been wrapping his legs with velcro wraps at assisted living, but his legs have not improved, and Ramos and his son think they have actually worsened.    Luz Elena tells me they discussed doing a Watchman at the [facility] hospital discharge visit, but he is hesitant to pursue another procedure and would rather resume his Xarelto. He is aware of the risks of anti-coagulation. He also mentions that he was sent to [facility] for follow up because that is where he was recently hospitalized, but he would prefer to continue to follow with Cardiology at [facility].    Current Cardiac Medications  Bumex 1 mg daily  Simvastatin 10 mg daily      PAST MEDICAL HISTORY:  Past Medical History:   Diagnosis Date    Atrial fibrillation (H)     Chronic kidney disease     GERD (gastroesophageal reflux disease)     Hyperlipidemia     Hypertension     Hypothyroid     Psoriasis     Syncope 1/29/2023       PAST SURGICAL HISTORY:  Past Surgical History:   Procedure Laterality Date    ARTHRODESIS ANKLE  06/08/2011    Procedure:ARTHRODESIS ANKLE; Subtalar and Tibiotalar Fusion     ; Surgeon:RAMESH WARNER; Location:[facility] OR    ARTHROPLASTY HIP      right    CATARACT EXTRACTION Bilateral     Dennise Schaffer    CATARACT IOL, RT/LT      CHOLECYSTECTOMY      CV TRANSCATHETER AORTIC VALVE REPLACEMENT TRANSAORTIC APPROACH N/A     REMOVE HARDWARE ANKLE  07/10/2012     Procedure: REMOVE HARDWARE ANKLE;  Right Heel Hardware Removal with irrigation  ;  Surgeon: Flaquito Hull MD;  Location: [facility]       FAMILY HISTORY:  Family History   Problem Relation Age of Onset    Cataracts Mother     Dementia Mother     Diabetes Mother     Alzheimer Disease Mother     Hypertension Father     Heart Disease Father     Kidney Disease Father     Diabetes Maternal Grandmother     Diabetes Maternal Grandfather     Spina bifida Brother     Low Back Problems Daughter        SOCIAL HISTORY:  Social History     Socioeconomic History    Marital status:    Tobacco Use    Smoking status: Former     Types: Cigarettes, Pipe, Cigars    Smokeless tobacco: Never   Substance and Sexual Activity    Alcohol use: Yes     Comment: 2 beers per evening    Drug use: No     Social Drivers of Health     Physical Activity: Inactive (3/3/2021)    Received from [employer] [employer] Count includes the [facility]    Exercise Vital Sign     Days of Exercise per Week: 0 days     Minutes of Exercise per Session: 0 min    Received from [employer] [facility] [employer] Count includes the [facility], [employer] [facility]    Social Connections       MEDICATIONS:  Current Outpatient Medications   Medication Sig Dispense Refill    ACE/ARB/ARNI NOT PRESCRIBED (INTENTIONAL) Please choose reason not prescribed from choices below.      bumetanide (BUMEX) 1 MG tablet Take 1 tablet (1 mg) by mouth daily.      carbidopa-levodopa (SINEMET)  MG tablet Take 1 tablet by mouth nightly as needed (RLS). 1 tablet 0    ferrous sulfate (FEROSUL) 325 (65 Fe) MG tablet Take 325 mg by mouth daily.      gabapentin (NEURONTIN) 300 MG capsule Take 1 capsule (300 mg) by mouth 2 times daily.      guaiFENesin (MUCINEX) 600 MG 12 hr tablet TAKE ONE TABLET BY MOUTH TWICE DAILY FOR COUGH *VIALS* 60 tablet 11    levothyroxine (SYNTHROID/LEVOTHROID) 125 MCG tablet Take 1 tablet (125 mcg) by mouth daily      mometasone (NASONEX)  50 MCG/ACT nasal spray Spray 2 sprays into both nostrils daily.      ORDER FOR DME Wheelchair; Right elevating leg rest  [employer]  Phone: [phone]  Diagnosis: Gait Instability related to Right Ankle Fusion 1 Device 0    pantoprazole (PROTONIX) 40 MG EC tablet Take 1 tablet (40 mg) by mouth 2 times daily.      simvastatin (ZOCOR) 20 MG tablet TAKE ONE TABLET BY MOUTH ONCE DAILY *VIALS* 30 tablet 11    tamsulosin (FLOMAX) 0.4 MG capsule Take 1 capsule (0.4 mg) by mouth every evening 90 capsule 3     No current facility-administered medications for this visit.        ALLERGIES:     Allergies   Allergen Reactions    Warfarin Itching     Alopecia    Nsaids Nephrotoxicity     Patient avoids due to kidney function       ROS:  A complete 10-point ROS was negative except as above.    PHYSICAL EXAM:  BP (!) 158/70 (BP Location: Right arm, Patient Position: Chair, Cuff Size: Adult Large)   Pulse 59   Wt 120.2 kg (265 lb)   SpO2 99%   BMI 36.96 kg/m    Gen: alert, interactive, NAD  Neck: supple, no JVD  CV: irregularly irregular, no m/r/g  Chest: CTAB, no wheezes or crackles  Abd: soft, NT, ND  Ext: 4+ pitting BLE edema    LABS:    CMP  Sodium   Date Value Ref Range Status   03/25/2025 129 (L) 135 - 145 mmol/L Final   11/19/2012 140 133 - 144 mmol/L Final     Potassium   Date Value Ref Range Status   03/25/2025 4.0 3.4 - 5.3 mmol/L Final   11/19/2012 4.8 3.4 - 5.3 mmol/L Final     Chloride   Date Value Ref Range Status   03/25/2025 95 (L) 98 - 107 mmol/L Final   11/19/2012 108 94 - 109 mmol/L Final     Carbon Dioxide   Date Value Ref Range Status   11/19/2012 24 20 - 32 mmol/L Final     Carbon Dioxide (CO2)   Date Value Ref Range Status   03/25/2025 23 22 - 29 mmol/L Final     Anion Gap   Date Value Ref Range Status   03/25/2025 11 7 - 15 mmol/L Final   11/19/2012 8 6 - 17 mmol/L Final     Glucose   Date Value Ref Range Status   03/25/2025 63 (L) 70 - 99 mg/dL Final   11/19/2012 90 60 - 99 mg/dL Final     Urea Nitrogen  "  Date Value Ref Range Status   03/25/2025 14.0 8.0 - 23.0 mg/dL Final   11/19/2012 25 7 - 30 mg/dL Final     Creatinine   Date Value Ref Range Status   03/25/2025 1.34 (H) 0.67 - 1.17 mg/dL Final   11/19/2012 1.33 (H) 0.66 - 1.25 mg/dL Final     GFR Estimate   Date Value Ref Range Status   03/25/2025 52 (L) >60 mL/min/1.73m2 Final   11/19/2012 53 (L) >60 mL/min/1.7m2 Final     Calcium   Date Value Ref Range Status   03/25/2025 8.5 (L) 8.8 - 10.4 mg/dL Final   11/19/2012 8.8 8.5 - 10.4 mg/dL Final       CBC  CBC RESULTS:   Recent Labs   Lab Test 03/11/25  1008   WBC 6.2   RBC 3.90*   HGB 10.7*   HCT 34.4*   MCV 88   MCH 27.4   MCHC 31.1*   RDW 14.7          TROP  No results found for: \"TROPI\", \"TROPONIN\", \"TROPR\", \"TROPN\"    LIPIDS  No results for input(s): \"CHOL\", \"HDL\", \"LDL\", \"TRIG\", \"CHOLHDLRATIO\" in the last 25180 hours.    TSH  TSH   Date Value Ref Range Status   02/10/2025 7.83 (H) 0.30 - 4.20 uIU/mL Final       HBA1C  Lab Results   Component Value Date    A1C 5.6 06/27/2023         CARDIAC DATA:   Zio 11/10/22  100% AF burden, self-controlled rate    14 day Zio monitor (2/6/2025):   Predominant rhythm is persistent AF with a controlled ventricular response ranging from  bpm (avg of 59 bpm). Bradycardia was during wakeful hours. No prolonged ventricular pauses were noted though.   Baseline IVCD was present.   Patient-triggered events correlated with AF.   Non-sustained VT was noted.  The longest was 11 seconds in duration.     EKG 12/10/24      Echo 4/8/25  s/p 29 mm Lu Jonny TAVR valve on 10/19/2021. Doppler interrogation of  the prosthetic valve is normal. No paravalvular or valvular regurgitation.  Left ventricular size is normal. Global and regional left ventricular function  is normal with an EF of 60-65%.  The right ventricle is normal size. Global right ventricular function is  normal.  The right ventricular systolic pressure is 41mmHg above the right atrial  pressure.  IVC diameter " measures 2.6 cm and collapsing <50% with sniff suggests a high RA  pressure estimated at 15 mmHg or greater.  Moderate (pulmonary artery systolic pressure 50-75mmHg) pulmonary hypertension  is present.  No pericardial effusion is present.  This study was compared with the study from 05/09/2023, right-sided filling  pressures are higher.    Coronary angiogram 9/27/2021  1. Successful Shockwave lithoplasty of the proximal and mid LAD (separate   catheters/guideliner delivered).   2. Successful PCI of the proximal-mid LAD following deployment of Orsiro   3.5 x 18 mm and 3.0 x 26 mm Nora in overlapping fashion, post dilated with   3.0 mm and 3.5 mm NC balloons respectively (IFR guided).   3. Successful PTCA of the diagonal.   4. Severe aortic stenosis confirmed preprocedure.     ASSESSMENT/PLAN:  In summary, Luz Elena Sampson is here today with the following -      # Permanent atrial fibrillation  # CAD s/p PCI to proximal and mid LAD 9/27/2021  # Aortic stenosis s/p TAVR 10/2021  # HFpEF  # Pulmonary hypertension    Primary cardiologist: Dr. Roge Romero    Blood pressures not controlled. Most recent lipid panel on 9/15/22 with LDL 85, TG 61. Echo from today with well-seated TAVR valve and no regurgitation. EF normal 60-65%, however RVSP is elevated, IVC dilated, and there is moderate pulmonary hypertension.     We discussed volume overload and need to try to improve fluid status. As patient has CKD, we will need to monitor labs closely with medication adjustments.     Patient additionally expressed that he is not interested in pursuing Watchman procedure at this time, and understands risks/benefits of resuming Xarelto. Neurology note from 3/7/25 outpatient visit states that CT head showed resolution in head bleed with no need for follow up imaging.    - CAD/HLD: continue simvastatin  - Afib: resume Xarelto 15 mg daily  GDMT:     - fluid status: hypervolemic, increase Bumex to 2 mg daily     - ACE/ARB/ARNI: consider  starting losartan if Entresto cost prohibitive, dependent on renal function     - SGLT2: MTM referral placed     - MRA: start spironolactone 12.5 mg daily  - Update lipid panel, BMP in 2 weeks  - Discussed importance of daily weights, 2g sodium diet, and medication adherence  - Patient aware to call with wt gain or >2 lbs in 24 hours or >5 lbs in one week   - Continue LE wraps for edema      Follow up:  - Establish with [facility]      JITENDRA Azevedo, JANIEP-C  [facility] General Cardiology  Securely message with [employer]         Chart review time: 15 minutes  Visit time: 51 minutes  Chart completion/documentation: 5 minutes  Total time spent: 71 minutes     The longitudinal plan of care for the diagnosis(es)/condition(s) as documented were addressed during this visit. Due to the added complexity in care, I will continue to support Luz Elena in the subsequent management and with ongoing continuity of care.

## 2025-04-08 NOTE — LETTER
2025      RE: Luz Elena Sampson  Attn Parmjit Sampson  [address]  [place] [address]       Dear Colleague,    Thank you for the opportunity to participate in the care of your patient, Luz Elena Sampson, at the [facility]. Please see a copy of my visit note below.        [facility]    Patient Name: Luz Elena Sampson   : 1940   Date of Visit: 2025  Primary Care Physician: Chandler Oconnell, JITENDRA ENG         Luz Elena Sampson is a pleasant 84 year old male, who presents for general follow up. Prior history significant for permanent atrial fibrillation with slow ventricular response, ischemic and valvular heart disease, peripheral arterial disease, hypertension, dyslipoproteinemia, stage III chronic kidney disease, obstructive sleep apnea, chronic venous insufficiency, hypothyroidism, and parkinsonism.     Cardiac History  - PCI at OSH 2021, with stent to proximal and mid LAD and balloon angioplasty of the diagonal   - aortic stenosis s/p bioprosthetic TAVR 10/2021    Last seen in clinic by Dr. Llamas on 12/10/24. No chest pain, shortness of breath, or dyspnea on activities of daily living. Did have chronic BLE edema with support wraps. He was started on lisinopril 5 mg daily for HTN.     Luz Elena was admitted to [facility] -25 after presenting with weakness, LE edema, cough, and URI symptoms, along with multiple falls over the past few weeks. He was found to have SDH and SAH and was admitted to the trauma team. He was diuresed with IV Bumex during hospitalization. Inpatient cardiology recommended oral Bumex 1 mg daily at the time of discharge, discussed with the patient about salt and fluid restriction. He was discharged on Zio patch to see if he has any symptomatic bradycardia. Orthostatic vitals were negative during hospital stay. His Xarelto was stopped at discharge until he was able to complete outpatient head CT  with neuro follow up.    He saw outpatient cardiology at [facility] on 3/6, who recommended resuming Xarelto pending neuro visit. Patient saw outpatient neurology on 3/7. Head CT at follow up showed resolution of SDH and SAH with no further follow up needed at this time. Neuro recommended that if there was an alternative to DOAC, that may be preferred with his history of falls and trauma.    Today in clinic, Luz Elena presents with his son. He denies SOB, but he coughs a lot per his son. Luz Elena says it is hard to know if he would feel short of breath with activity because he does not do much activity.     His mobility has been limited by his difficulties in maintaining balance related to his underlying Parkinson's disease. He uses a walker around the apartment and for short distances, but a motorized scooter for longer hallways and outside the apartment. Patient feels his activity is further limited by his ongoing edema.     They have been wrapping his legs with velcro wraps at assisted living, but his legs have not improved, and Ramos and his son think they have actually worsened.    Luz Elena tells me they discussed doing a Watchman at the [facility] hospital discharge visit, but he is hesitant to pursue another procedure and would rather resume his Xarelto. He is aware of the risks of anti-coagulation. He also mentions that he was sent to [facility] for follow up because that is where he was recently hospitalized, but he would prefer to continue to follow with Cardiology at [place].    Current Cardiac Medications  Bumex 1 mg daily  Simvastatin 10 mg daily      PAST MEDICAL HISTORY:  Past Medical History:   Diagnosis Date     Atrial fibrillation (H)      Chronic kidney disease      GERD (gastroesophageal reflux disease)      Hyperlipidemia      Hypertension      Hypothyroid      Psoriasis      Syncope 1/29/2023       PAST SURGICAL HISTORY:  Past Surgical History:   Procedure Laterality Date     ARTHRODESIS ANKLE  06/08/2011     Procedure:ARTHRODESIS ANKLE; Subtalar and Tibiotalar Fusion     ; Surgeon:FLAQUITO WARNER; Location:[facility]     ARTHROPLASTY HIP      right     CATARACT EXTRACTION Bilateral     [place] Dr. Schaffer     CATARACT IOL, RT/LT       CHOLECYSTECTOMY       CV TRANSCATHETER AORTIC VALVE REPLACEMENT TRANSAORTIC APPROACH N/A      REMOVE HARDWARE ANKLE  07/10/2012    Procedure: REMOVE HARDWARE ANKLE;  Right Heel Hardware Removal with irrigation  ;  Surgeon: Flaquito Warner MD;  Location: [facility]       FAMILY HISTORY:  Family History   Problem Relation Age of Onset     Cataracts Mother      Dementia Mother      Diabetes Mother      Alzheimer Disease Mother      Hypertension Father      Heart Disease Father      Kidney Disease Father      Diabetes Maternal Grandmother      Diabetes Maternal Grandfather      Spina bifida Brother      Low Back Problems Daughter        SOCIAL HISTORY:  Social History     Socioeconomic History     Marital status:    Tobacco Use     Smoking status: Former     Types: Cigarettes, Pipe, Cigars     Smokeless tobacco: Never   Substance and Sexual Activity     Alcohol use: Yes     Comment: 2 beers per evening     Drug use: No     Social Drivers of Health     Physical Activity: Inactive (3/3/2021)    Received from [facility], [facility]    Exercise Vital Sign      Days of Exercise per Week: 0 days      Minutes of Exercise per Session: 0 min    Received from [facility], [facility]    Social Connections       MEDICATIONS:  Current Outpatient Medications   Medication Sig Dispense Refill     ACE/ARB/ARNI NOT PRESCRIBED (INTENTIONAL) Please choose reason not prescribed from choices below.       bumetanide (BUMEX) 1 MG tablet Take 1 tablet (1 mg) by mouth daily.       carbidopa-levodopa (SINEMET)  MG tablet Take 1 tablet by mouth nightly as needed (RLS). 1 tablet 0     ferrous sulfate  (FEROSUL) 325 (65 Fe) MG tablet Take 325 mg by mouth daily.       gabapentin (NEURONTIN) 300 MG capsule Take 1 capsule (300 mg) by mouth 2 times daily.       guaiFENesin (MUCINEX) 600 MG 12 hr tablet TAKE ONE TABLET BY MOUTH TWICE DAILY FOR COUGH *VIALS* 60 tablet 11     levothyroxine (SYNTHROID/LEVOTHROID) 125 MCG tablet Take 1 tablet (125 mcg) by mouth daily       mometasone (NASONEX) 50 MCG/ACT nasal spray Spray 2 sprays into both nostrils daily.       ORDER FOR DME Wheelchair; Right elevating leg rest  [employer]  Phone: [phone]  Diagnosis: Gait Instability related to Right Ankle Fusion 1 Device 0     pantoprazole (PROTONIX) 40 MG EC tablet Take 1 tablet (40 mg) by mouth 2 times daily.       simvastatin (ZOCOR) 20 MG tablet TAKE ONE TABLET BY MOUTH ONCE DAILY *VIALS* 30 tablet 11     tamsulosin (FLOMAX) 0.4 MG capsule Take 1 capsule (0.4 mg) by mouth every evening 90 capsule 3     No current facility-administered medications for this visit.        ALLERGIES:     Allergies   Allergen Reactions     Warfarin Itching     Alopecia     Nsaids Nephrotoxicity     Patient avoids due to kidney function       ROS:  A complete 10-point ROS was negative except as above.    PHYSICAL EXAM:  BP (!) 158/70 (BP Location: Right arm, Patient Position: Chair, Cuff Size: Adult Large)   Pulse 59   Wt 120.2 kg (265 lb)   SpO2 99%   BMI 36.96 kg/m    Gen: alert, interactive, NAD  Neck: supple, no JVD  CV: irregularly irregular, no m/r/g  Chest: CTAB, no wheezes or crackles  Abd: soft, NT, ND  Ext: 4+ pitting BLE edema    LABS:    CMP  Sodium   Date Value Ref Range Status   03/25/2025 129 (L) 135 - 145 mmol/L Final   11/19/2012 140 133 - 144 mmol/L Final     Potassium   Date Value Ref Range Status   03/25/2025 4.0 3.4 - 5.3 mmol/L Final   11/19/2012 4.8 3.4 - 5.3 mmol/L Final     Chloride   Date Value Ref Range Status   03/25/2025 95 (L) 98 - 107 mmol/L Final   11/19/2012 108 94 - 109 mmol/L Final     Carbon Dioxide   Date Value Ref  "Range Status   11/19/2012 24 20 - 32 mmol/L Final     Carbon Dioxide (CO2)   Date Value Ref Range Status   03/25/2025 23 22 - 29 mmol/L Final     Anion Gap   Date Value Ref Range Status   03/25/2025 11 7 - 15 mmol/L Final   11/19/2012 8 6 - 17 mmol/L Final     Glucose   Date Value Ref Range Status   03/25/2025 63 (L) 70 - 99 mg/dL Final   11/19/2012 90 60 - 99 mg/dL Final     Urea Nitrogen   Date Value Ref Range Status   03/25/2025 14.0 8.0 - 23.0 mg/dL Final   11/19/2012 25 7 - 30 mg/dL Final     Creatinine   Date Value Ref Range Status   03/25/2025 1.34 (H) 0.67 - 1.17 mg/dL Final   11/19/2012 1.33 (H) 0.66 - 1.25 mg/dL Final     GFR Estimate   Date Value Ref Range Status   03/25/2025 52 (L) >60 mL/min/1.73m2 Final   11/19/2012 53 (L) >60 mL/min/1.7m2 Final     Calcium   Date Value Ref Range Status   03/25/2025 8.5 (L) 8.8 - 10.4 mg/dL Final   11/19/2012 8.8 8.5 - 10.4 mg/dL Final       CBC  CBC RESULTS:   Recent Labs   Lab Test 03/11/25  1008   WBC 6.2   RBC 3.90*   HGB 10.7*   HCT 34.4*   MCV 88   MCH 27.4   MCHC 31.1*   RDW 14.7          TROP  No results found for: \"TROPI\", \"TROPONIN\", \"TROPR\", \"TROPN\"    LIPIDS  No results for input(s): \"CHOL\", \"HDL\", \"LDL\", \"TRIG\", \"CHOLHDLRATIO\" in the last 46497 hours.    TSH  TSH   Date Value Ref Range Status   02/10/2025 7.83 (H) 0.30 - 4.20 uIU/mL Final       HBA1C  Lab Results   Component Value Date    A1C 5.6 06/27/2023         CARDIAC DATA:   Zio 11/10/22  100% AF burden, self-controlled rate    14 day Zio monitor (2/6/2025):   Predominant rhythm is persistent AF with a controlled ventricular response ranging from  bpm (avg of 59 bpm). Bradycardia was during wakeful hours. No prolonged ventricular pauses were noted though.   Baseline IVCD was present.   Patient-triggered events correlated with AF.   Non-sustained VT was noted.  The longest was 11 seconds in duration.     EKG 12/10/24      Echo 4/8/25  s/p 29 mm Lu Jonny TAVR valve on 10/19/2021. " Doppler interrogation of  the prosthetic valve is normal. No paravalvular or valvular regurgitation.  Left ventricular size is normal. Global and regional left ventricular function  is normal with an EF of 60-65%.  The right ventricle is normal size. Global right ventricular function is  normal.  The right ventricular systolic pressure is 41mmHg above the right atrial  pressure.  IVC diameter measures 2.6 cm and collapsing <50% with sniff suggests a high RA  pressure estimated at 15 mmHg or greater.  Moderate (pulmonary artery systolic pressure 50-75mmHg) pulmonary hypertension  is present.  No pericardial effusion is present.  This study was compared with the study from 05/09/2023, right-sided filling  pressures are higher.    Coronary angiogram 9/27/2021  1. Successful Shockwave lithoplasty of the proximal and mid LAD (separate   catheters/guideliner delivered).   2. Successful PCI of the proximal-mid LAD following deployment of Orsiro   3.5 x 18 mm and 3.0 x 26 mm Nora in overlapping fashion, post dilated with   3.0 mm and 3.5 mm NC balloons respectively (IFR guided).   3. Successful PTCA of the diagonal.   4. Severe aortic stenosis confirmed preprocedure.     ASSESSMENT/PLAN:  In summary, Luz Elena Sampson is here today with the following -      # Permanent atrial fibrillation  # CAD s/p PCI to proximal and mid LAD 9/27/2021  # Aortic stenosis s/p TAVR 10/2021  # HFpEF  # Pulmonary hypertension    Primary cardiologist: Dr. Roge Romero    Blood pressures not controlled. Most recent lipid panel on 9/15/22 with LDL 85, TG 61. Echo from today with well-seated TAVR valve and no regurgitation. EF normal 60-65%, however RVSP is elevated, IVC dilated, and there is moderate pulmonary hypertension.     We discussed volume overload and need to try to improve fluid status. As patient has CKD, we will need to monitor labs closely with medication adjustments.     Patient additionally expressed that he is not interested in  pursuing Watchman procedure at this time, and understands risks/benefits of resuming Xarelto. Neurology note from 3/7/25 outpatient visit states that CT head showed resolution in head bleed with no need for follow up imaging.    - CAD/HLD: continue simvastatin  - Afib: resume Xarelto 15 mg daily  GDMT:     - fluid status: hypervolemic, increase Bumex to 2 mg daily     - ACE/ARB/ARNI: consider starting losartan if Entresto cost prohibitive, dependent on renal function     - SGLT2: MTM referral placed     - MRA: start spironolactone 12.5 mg daily  - Update lipid panel, BMP in 2 weeks  - Discussed importance of daily weights, 2g sodium diet, and medication adherence  - Patient aware to call with wt gain or >2 lbs in 24 hours or >5 lbs in one week   - Continue LE wraps for edema      Follow up:  - Establish with [facility]      JITENDRA Azevedo, JANIEP-C  [facility] General Cardiology  Securely message with Vys[employer]         Chart review time: 15 minutes  Visit time: 51 minutes  Chart completion/documentation: 5 minutes  Total time spent: 71 minutes     The longitudinal plan of care for the diagnosis(es)/condition(s) as documented were addressed during this visit. Due to the added complexity in care, I will continue to support Luz Elena in the subsequent management and with ongoing continuity of care.       Please do not hesitate to contact me if you have any questions/concerns.     Sincerely,     JITENDRA Azevedo CNP

## 2025-04-08 NOTE — LETTER
2025       RE: Luz Elena Sampson  Attn Parmjit Sampson  [address]  [place] [address]     Dear Colleague,    Thank you for referring your patient, Luz Elena Sampson, to the [facility]IC [facility]. Please see a copy of my visit note below.        [facility]    Patient Name: Luz Elena Sampson   : 1940   Date of Visit: 2025  Primary Care Physician: JITENDRA Freitas CNP         Luz Elena Sampson is a pleasant 84 year old male, who presents for general follow up. Prior history significant for permanent atrial fibrillation with slow ventricular response, ischemic and valvular heart disease, peripheral arterial disease, hypertension, dyslipoproteinemia, stage III chronic kidney disease, obstructive sleep apnea, chronic venous insufficiency, hypothyroidism, and parkinsonism.     Cardiac History  - PCI at OSH 2021, with stent to proximal and mid LAD and balloon angioplasty of the diagonal   - aortic stenosis s/p bioprosthetic TAVR 10/2021    Last seen in clinic by Dr. Llamas on 12/10/24. No chest pain, shortness of breath, or dyspnea on activities of daily living. Did have chronic BLE edema with support wraps. He was started on lisinopril 5 mg daily for HTN.     Luz Elena was admitted to [facility] -25 after presenting with weakness, LE edema, cough, and URI symptoms, along with multiple falls over the past few weeks. He was found to have SDH and SAH and was admitted to the trauma team. He was diuresed with IV Bumex during hospitalization. Inpatient cardiology recommended oral Bumex 1 mg daily at the time of discharge, discussed with the patient about salt and fluid restriction. He was discharged on Zio patch to see if he has any symptomatic bradycardia. Orthostatic vitals were negative during hospital stay. His Xarelto was stopped at discharge until he was able to complete outpatient head CT with neuro follow up.    He saw  outpatient cardiology at [facility] on 3/6, who recommended resuming Xarelto pending neuro visit. Patient saw outpatient neurology on 3/7. Head CT at follow up showed resolution of SDH and SAH with no further follow up needed at this time. Neuro recommended that if there was an alternative to DOAC, that may be preferred with his history of falls and trauma.    Today in clinic, Luz Elena presents with his son. He denies SOB, but he coughs a lot per his son. Luz Elena says it is hard to know if he would feel short of breath with activity because he does not do much activity.     His mobility has been limited by his difficulties in maintaining balance related to his underlying Parkinson's disease. He uses a walker around the apartment and for short distances, but a motorized scooter for longer hallways and outside the apartment. Patient feels his activity is further limited by his ongoing edema.     They have been wrapping his legs with velcro wraps at assisted living, but his legs have not improved, and Ramos and his son think they have actually worsened.    Luz Elena tells me they discussed doing a Watchman at the [facility] hospital discharge visit, but he is hesitant to pursue another procedure and would rather resume his Xarelto. He is aware of the risks of anti-coagulation. He also mentions that he was sent to [facility] for follow up because that is where he was recently hospitalized, but he would prefer to continue to follow with Cardiology at [place].    Current Cardiac Medications  Bumex 1 mg daily  Simvastatin 10 mg daily      PAST MEDICAL HISTORY:  Past Medical History:   Diagnosis Date     Atrial fibrillation (H)      Chronic kidney disease      GERD (gastroesophageal reflux disease)      Hyperlipidemia      Hypertension      Hypothyroid      Psoriasis      Syncope 1/29/2023       PAST SURGICAL HISTORY:  Past Surgical History:   Procedure Laterality Date     ARTHRODESIS ANKLE  06/08/2011    Procedure:ARTHRODESIS ANKLE; Subtalar  and Tibiotalar Fusion     ; Surgeon:FLAQUITO WARNER; Location:[facility]     ARTHROPLASTY HIP      right     CATARACT EXTRACTION Bilateral     Westonadriana Schaffer     CATARACT IOL, RT/LT       CHOLECYSTECTOMY       CV TRANSCATHETER AORTIC VALVE REPLACEMENT TRANSAORTIC APPROACH N/A      REMOVE HARDWARE ANKLE  07/10/2012    Procedure: REMOVE HARDWARE ANKLE;  Right Heel Hardware Removal with irrigation  ;  Surgeon: Flaquito Warner MD;  Location: [facility]       FAMILY HISTORY:  Family History   Problem Relation Age of Onset     Cataracts Mother      Dementia Mother      Diabetes Mother      Alzheimer Disease Mother      Hypertension Father      Heart Disease Father      Kidney Disease Father      Diabetes Maternal Grandmother      Diabetes Maternal Grandfather      Spina bifida Brother      Low Back Problems Daughter        SOCIAL HISTORY:  Social History     Socioeconomic History     Marital status:    Tobacco Use     Smoking status: Former     Types: Cigarettes, Pipe, Cigars     Smokeless tobacco: Never   Substance and Sexual Activity     Alcohol use: Yes     Comment: 2 beers per evening     Drug use: No     Social Drivers of Health     Physical Activity: Inactive (3/3/2021)    Received from [facility]    Exercise Vital Sign      Days of Exercise per Week: 0 days      Minutes of Exercise per Session: 0 min    Received from [facility], [employer] [facility]    Social Connections       MEDICATIONS:  Current Outpatient Medications   Medication Sig Dispense Refill     ACE/ARB/ARNI NOT PRESCRIBED (INTENTIONAL) Please choose reason not prescribed from choices below.       bumetanide (BUMEX) 1 MG tablet Take 1 tablet (1 mg) by mouth daily.       carbidopa-levodopa (SINEMET)  MG tablet Take 1 tablet by mouth nightly as needed (RLS). 1 tablet 0     ferrous sulfate (FEROSUL) 325 (65 Fe) MG tablet Take  325 mg by mouth daily.       gabapentin (NEURONTIN) 300 MG capsule Take 1 capsule (300 mg) by mouth 2 times daily.       guaiFENesin (MUCINEX) 600 MG 12 hr tablet TAKE ONE TABLET BY MOUTH TWICE DAILY FOR COUGH *VIALS* 60 tablet 11     levothyroxine (SYNTHROID/LEVOTHROID) 125 MCG tablet Take 1 tablet (125 mcg) by mouth daily       mometasone (NASONEX) 50 MCG/ACT nasal spray Spray 2 sprays into both nostrils daily.       ORDER FOR [facility] [place] Wheelchair; Right elevating leg rest  [employer]  Phone: [phone]  Diagnosis: Gait Instability related to Right Ankle Fusion 1 Device 0     pantoprazole (PROTONIX) 40 MG EC tablet Take 1 tablet (40 mg) by mouth 2 times daily.       simvastatin (ZOCOR) 20 MG tablet TAKE ONE TABLET BY MOUTH ONCE DAILY *VIALS* 30 tablet 11     tamsulosin (FLOMAX) 0.4 MG capsule Take 1 capsule (0.4 mg) by mouth every evening 90 capsule 3     No current facility-administered medications for this visit.        ALLERGIES:     Allergies   Allergen Reactions     Warfarin Itching     Alopecia     Nsaids Nephrotoxicity     Patient avoids due to kidney function       ROS:  A complete 10-point ROS was negative except as above.    PHYSICAL EXAM:  BP (!) 158/70 (BP Location: Right arm, Patient Position: Chair, Cuff Size: Adult Large)   Pulse 59   Wt 120.2 kg (265 lb)   SpO2 99%   BMI 36.96 kg/m    Gen: alert, interactive, NAD  Neck: supple, no JVD  CV: irregularly irregular, no m/r/g  Chest: CTAB, no wheezes or crackles  Abd: soft, NT, ND  Ext: 4+ pitting BLE edema    LABS:    CMP  Sodium   Date Value Ref Range Status   03/25/2025 129 (L) 135 - 145 mmol/L Final   11/19/2012 140 133 - 144 mmol/L Final     Potassium   Date Value Ref Range Status   03/25/2025 4.0 3.4 - 5.3 mmol/L Final   11/19/2012 4.8 3.4 - 5.3 mmol/L Final     Chloride   Date Value Ref Range Status   03/25/2025 95 (L) 98 - 107 mmol/L Final   11/19/2012 108 94 - 109 mmol/L Final     Carbon Dioxide   Date Value Ref Range Status   11/19/2012 24 20 - 32  "mmol/L Final     Carbon Dioxide (CO2)   Date Value Ref Range Status   03/25/2025 23 22 - 29 mmol/L Final     Anion Gap   Date Value Ref Range Status   03/25/2025 11 7 - 15 mmol/L Final   11/19/2012 8 6 - 17 mmol/L Final     Glucose   Date Value Ref Range Status   03/25/2025 63 (L) 70 - 99 mg/dL Final   11/19/2012 90 60 - 99 mg/dL Final     Urea Nitrogen   Date Value Ref Range Status   03/25/2025 14.0 8.0 - 23.0 mg/dL Final   11/19/2012 25 7 - 30 mg/dL Final     Creatinine   Date Value Ref Range Status   03/25/2025 1.34 (H) 0.67 - 1.17 mg/dL Final   11/19/2012 1.33 (H) 0.66 - 1.25 mg/dL Final     GFR Estimate   Date Value Ref Range Status   03/25/2025 52 (L) >60 mL/min/1.73m2 Final   11/19/2012 53 (L) >60 mL/min/1.7m2 Final     Calcium   Date Value Ref Range Status   03/25/2025 8.5 (L) 8.8 - 10.4 mg/dL Final   11/19/2012 8.8 8.5 - 10.4 mg/dL Final       CBC  CBC RESULTS:   Recent Labs   Lab Test 03/11/25  1008   WBC 6.2   RBC 3.90*   HGB 10.7*   HCT 34.4*   MCV 88   MCH 27.4   MCHC 31.1*   RDW 14.7          TROP  No results found for: \"TROPI\", \"TROPONIN\", \"TROPR\", \"TROPN\"    LIPIDS  No results for input(s): \"CHOL\", \"HDL\", \"LDL\", \"TRIG\", \"CHOLHDLRATIO\" in the last 62920 hours.    TSH  TSH   Date Value Ref Range Status   02/10/2025 7.83 (H) 0.30 - 4.20 uIU/mL Final       HBA1C  Lab Results   Component Value Date    A1C 5.6 06/27/2023         CARDIAC DATA:   Zio 11/10/22  100% AF burden, self-controlled rate    14 day Zio monitor (2/6/2025):   Predominant rhythm is persistent AF with a controlled ventricular response ranging from  bpm (avg of 59 bpm). Bradycardia was during wakeful hours. No prolonged ventricular pauses were noted though.   Baseline IVCD was present.   Patient-triggered events correlated with AF.   Non-sustained VT was noted.  The longest was 11 seconds in duration.     EKG 12/10/24      Echo 4/8/25  s/p 29 mm Lu Jonny TAVR valve on 10/19/2021. Doppler interrogation of  the prosthetic " valve is normal. No paravalvular or valvular regurgitation.  Left ventricular size is normal. Global and regional left ventricular function  is normal with an EF of 60-65%.  The right ventricle is normal size. Global right ventricular function is  normal.  The right ventricular systolic pressure is 41mmHg above the right atrial  pressure.  IVC diameter measures 2.6 cm and collapsing <50% with sniff suggests a high RA  pressure estimated at 15 mmHg or greater.  Moderate (pulmonary artery systolic pressure 50-75mmHg) pulmonary hypertension  is present.  No pericardial effusion is present.  This study was compared with the study from 05/09/2023, right-sided filling  pressures are higher.    Coronary angiogram 9/27/2021  1. Successful Shockwave lithoplasty of the proximal and mid LAD (separate   catheters/guideliner delivered).   2. Successful PCI of the proximal-mid LAD following deployment of Orsiro   3.5 x 18 mm and 3.0 x 26 mm Nora in overlapping fashion, post dilated with   3.0 mm and 3.5 mm NC balloons respectively (IFR guided).   3. Successful PTCA of the diagonal.   4. Severe aortic stenosis confirmed preprocedure.     ASSESSMENT/PLAN:  In summary, Luz Elena Sampson is here today with the following -      # Permanent atrial fibrillation  # CAD s/p PCI to proximal and mid LAD 9/27/2021  # Aortic stenosis s/p TAVR 10/2021  # HFpEF  # Pulmonary hypertension    Primary cardiologist: Dr. Roge Romero    Blood pressures not controlled. Most recent lipid panel on 9/15/22 with LDL 85, TG 61. Echo from today with well-seated TAVR valve and no regurgitation. EF normal 60-65%, however RVSP is elevated, IVC dilated, and there is moderate pulmonary hypertension.     We discussed volume overload and need to try to improve fluid status. As patient has CKD, we will need to monitor labs closely with medication adjustments.     Patient additionally expressed that he is not interested in pursuing Watchman procedure at this time, and  understands risks/benefits of resuming Xarelto. Neurology note from 3/7/25 outpatient visit states that CT head showed resolution in head bleed with no need for follow up imaging.    - CAD/HLD: continue simvastatin  - Afib: resume Xarelto 15 mg daily  GDMT:     - fluid status: hypervolemic, increase Bumex to 2 mg daily     - ACE/ARB/ARNI: consider starting losartan if Entresto cost prohibitive, dependent on renal function     - SGLT2: MTM referral placed     - MRA: start spironolactone 12.5 mg daily  - Update lipid panel, BMP in 2 weeks  - Discussed importance of daily weights, 2g sodium diet, and medication adherence  - Patient aware to call with wt gain or >2 lbs in 24 hours or >5 lbs in one week   - Continue LE wraps for edema      Follow up:  - Establish with [facility]      JITENDRA Azevedo, JANIEP-C  [facility] General Cardiology  Securely message with [employer]         Chart review time: 15 minutes  Visit time: 51 minutes  Chart completion/documentation: 5 minutes  Total time spent: 71 minutes     The longitudinal plan of care for the diagnosis(es)/condition(s) as documented were addressed during this visit. Due to the added complexity in care, I will continue to support Luz Elena in the subsequent management and with ongoing continuity of care.       Again, thank you for allowing me to participate in the care of your patient.      Sincerely,    JITENDRA Azevedo CNP

## 2025-04-10 ENCOUNTER — LAB REQUISITION (OUTPATIENT)
Dept: LAB | Facility: CLINIC | Age: 85
End: 2025-04-10
Payer: MEDICARE

## 2025-04-10 ENCOUNTER — TELEPHONE (OUTPATIENT)
Dept: CARDIOLOGY | Facility: CLINIC | Age: 85
End: 2025-04-10
Payer: MEDICARE

## 2025-04-10 DIAGNOSIS — I50.1 LEFT VENTRICULAR FAILURE, UNSPECIFIED (H): ICD-10-CM

## 2025-04-10 DIAGNOSIS — I50.9 CHRONIC CONGESTIVE HEART FAILURE, UNSPECIFIED HEART FAILURE TYPE (H): Primary | ICD-10-CM

## 2025-04-10 DIAGNOSIS — E11.9 TYPE 2 DIABETES MELLITUS WITHOUT COMPLICATIONS (H): ICD-10-CM

## 2025-04-10 DIAGNOSIS — N18.1 CHRONIC KIDNEY DISEASE, STAGE 1: ICD-10-CM

## 2025-04-10 DIAGNOSIS — D64.9 ANEMIA, UNSPECIFIED: ICD-10-CM

## 2025-04-10 NOTE — TELEPHONE ENCOUNTER
Date: 4/10/2025    Time of Call: 3:41 PM     Diagnosis:  HF      [ VORB ] Ordering provider: Zhanna Bhardwaj NP  Order: start jardiance 10 mg daily.  Price is affordable for patient $20 per month     Order received by: Izzy Stanley RN       Follow-up/additional notes: discuss starting entresto during MTM visit 5/6.  Auth to discuss signed 4/9/25 orders placed, prescription sent to preferred pharmacy and caller informed

## 2025-04-10 NOTE — TELEPHONE ENCOUNTER
[facility] Call Center    Phone Message    May a detailed message be left on voicemail: yes     Reason for Call: Medication Question or concern regarding medication   Prescription Clarification  Name of Medication: Entresto   Prescribing Provider: stevenson      What on the order needs clarification? Pt's son, Parmjit, was calling due to the pharmacy management appts being booked until the start of May 2025 and Stevenson wanted pt to have meeting with them to discuss starting Entresto. Pt's son is wondering if that timeline is okay, is there anything they should be doing or taking in the mean time, etc.       Action Taken: Other: cardiology     Travel Screening: Not Applicable      Thank you!  [facility]        Date of Service:

## 2025-04-10 NOTE — TELEPHONE ENCOUNTER
30 days supply test claims requested for the drugs below:  Jardiance 10mg daily, Farxiga 10mg daily, Entresto 49-51mg BID    Jardiance 10mg daily  -$20.00 copay          Farxiga 10mg daily  -$20.00 copay         Entresto 49-51mg BID  -$35.00 copay

## 2025-04-15 LAB
ANION GAP SERPL CALCULATED.3IONS-SCNC: 13 MMOL/L (ref 7–15)
BUN SERPL-MCNC: 22.2 MG/DL (ref 8–23)
CALCIUM SERPL-MCNC: 8.9 MG/DL (ref 8.8–10.4)
CHLORIDE SERPL-SCNC: 95 MMOL/L (ref 98–107)
CREAT SERPL-MCNC: 1.68 MG/DL (ref 0.67–1.17)
EGFRCR SERPLBLD CKD-EPI 2021: 40 ML/MIN/1.73M2
ERYTHROCYTE [DISTWIDTH] IN BLOOD BY AUTOMATED COUNT: 14.2 % (ref 10–15)
EST. AVERAGE GLUCOSE BLD GHB EST-MCNC: 123 MG/DL
GLUCOSE SERPL-MCNC: 149 MG/DL (ref 70–99)
HBA1C MFR BLD: 5.9 %
HCO3 SERPL-SCNC: 23 MMOL/L (ref 22–29)
HCT VFR BLD AUTO: 34.7 % (ref 40–53)
HGB BLD-MCNC: 10.8 G/DL (ref 13.3–17.7)
MCH RBC QN AUTO: 26.9 PG (ref 26.5–33)
MCHC RBC AUTO-ENTMCNC: 31.1 G/DL (ref 31.5–36.5)
MCV RBC AUTO: 86 FL (ref 78–100)
NT-PROBNP SERPL-MCNC: 4022 PG/ML (ref 0–1800)
PLATELET # BLD AUTO: 185 10E3/UL (ref 150–450)
POTASSIUM SERPL-SCNC: 4.4 MMOL/L (ref 3.4–5.3)
RBC # BLD AUTO: 4.02 10E6/UL (ref 4.4–5.9)
SODIUM SERPL-SCNC: 131 MMOL/L (ref 135–145)
WBC # BLD AUTO: 4.3 10E3/UL (ref 4–11)

## 2025-04-15 PROCEDURE — 80048 BASIC METABOLIC PNL TOTAL CA: CPT | Mod: ORL | Performed by: NURSE PRACTITIONER

## 2025-04-15 PROCEDURE — 83880 ASSAY OF NATRIURETIC PEPTIDE: CPT | Mod: ORL | Performed by: NURSE PRACTITIONER

## 2025-04-16 ENCOUNTER — ASSISTED LIVING VISIT (OUTPATIENT)
Dept: GERIATRICS | Facility: CLINIC | Age: 85
End: 2025-04-16
Payer: MEDICARE

## 2025-04-16 VITALS
OXYGEN SATURATION: 95 % | WEIGHT: 251 LBS | HEART RATE: 63 BPM | SYSTOLIC BLOOD PRESSURE: 158 MMHG | DIASTOLIC BLOOD PRESSURE: 84 MMHG | RESPIRATION RATE: 16 BRPM | TEMPERATURE: 97.4 F | BODY MASS INDEX: 35.01 KG/M2

## 2025-04-16 DIAGNOSIS — N18.32 CHRONIC KIDNEY DISEASE, STAGE 3B (H): ICD-10-CM

## 2025-04-16 DIAGNOSIS — R60.0 BILATERAL LEG EDEMA: ICD-10-CM

## 2025-04-16 DIAGNOSIS — D64.9 ANEMIA, UNSPECIFIED TYPE: ICD-10-CM

## 2025-04-16 DIAGNOSIS — I50.9 CHRONIC CONGESTIVE HEART FAILURE, UNSPECIFIED HEART FAILURE TYPE (H): Primary | ICD-10-CM

## 2025-04-16 PROCEDURE — 99349 HOME/RES VST EST MOD MDM 40: CPT | Performed by: NURSE PRACTITIONER

## 2025-04-16 NOTE — LETTER
4/16/2025      Luz Elena Sampson  Attn Parmjit Sampson  [address]  [place] [address]        [facility] GERIATRICS    Chief Complaint   Patient presents with     RECHECK     HPI:  Luz Elena Sampson is a 84 year old  (1940), who is being seen today for an episodic care visit at: [facility] [[id number]]. Today's concern is:   1. Chronic congestive heart failure, unspecified heart failure type (H)    2. Bilateral leg edema    3. Chronic kidney disease, stage 3b (H)    4. Anemia, unspecified type      Patient seen for follow up, labs on 4/15 BNP 4022, leg and pedal edema 2-3+, recent cardiology appt with diuretics increased, weight down to 259lbs from recent high 267, edema not improved, K 4.4, renal status slight decline with creat 1.68 from previous 1.34, Hgb was 10.7 and now 10.8 after starting iron supplement, overall no real change in status, no distress.    Allergies, and PMH/PSH reviewed in EPIC today.  REVIEW OF SYSTEMS:  4 point ROS including Respiratory, CV, GI and , other than that noted in the HPI,  is negative    Objective:   BP (!) 158/84   Pulse 63   Temp 97.4  F (36.3  C)   Resp 16   Wt 113.9 kg (251 lb)   SpO2 95%   BMI 35.01 kg/m    GENERAL APPEARANCE:  in no distress, appears healthy  ENT:  Mouth and posterior oropharynx normal, moist mucous membranes  RESP:  lungs clear to auscultation , no respiratory distress  CV:  peripheral edema 2-3+ in legs/pedal, rate-normal  ABDOMEN:  bowel sounds normal  M/S:   Gait and station abnormal using walker  SKIN:  Inspection of skin and subcutaneous tissue baseline  NEURO:   Examination of sensation by touch normal  PSYCH:  affect and mood normal    Labs done in SNF are in [facility]. Please refer to them using [facility] Everywhere.    Assessment/Plan:  (I50.9) Chronic congestive heart failure, unspecified heart failure   (R60.0) Bilateral leg edema  Comment: BNP 4022, edema still 2-3+, mild hypervolemia  Plan:    -continue increase bumex 2mg, spironolactone, jardiance  -daily weights by patient  -encourage elevation  -continue leg compression  -repeat BNP, BMP on 5/13    (N18.32) Chronic kidney disease, stage 3b (H)  Comment: creat up to 1.68, GFR 40  Plan:   -dose meds renally  -repeat BMP on 5/13  -if renal status declines again consider reducing diuretics    (D64.9) Anemia, unspecified type  Comment: Hgb 10.8, up from 8.8 in Feb  Plan:   -continue Fe supplement  -continue protonix BID  -CBC on 5/13    MED REC REQUIRED  Post Medication Reconciliation Status: medication reconcilation previously completed during another office visit        Electronically signed by: JITENDRA Freitas CNP          Sincerely,        JITENDRA Freitas CNP    Electronically signed

## 2025-04-16 NOTE — PROGRESS NOTES
[facility] GERIATRICS    Chief Complaint   Patient presents with    RECHECK     HPI:  Luz Elena Sampson is a 84 year old  (1940), who is being seen today for an episodic care visit at: [facility] [[id number]]. Today's concern is:   1. Chronic congestive heart failure, unspecified heart failure type (H)    2. Bilateral leg edema    3. Chronic kidney disease, stage 3b (H)    4. Anemia, unspecified type      Patient seen for follow up, labs on 4/15 BNP 4022, leg and pedal edema 2-3+, recent cardiology appt with diuretics increased, weight down to 259lbs from recent high 267, edema not improved, K 4.4, renal status slight decline with creat 1.68 from previous 1.34, Hgb was 10.7 and now 10.8 after starting iron supplement, overall no real change in status, no distress.    Allergies, and PMH/PSH reviewed in EPIC today.  REVIEW OF SYSTEMS:  4 point ROS including Respiratory, CV, GI and , other than that noted in the HPI,  is negative    Objective:   BP (!) 158/84   Pulse 63   Temp 97.4  F (36.3  C)   Resp 16   Wt 113.9 kg (251 lb)   SpO2 95%   BMI 35.01 kg/m    GENERAL APPEARANCE:  in no distress, appears healthy  ENT:  Mouth and posterior oropharynx normal, moist mucous membranes  RESP:  lungs clear to auscultation , no respiratory distress  CV:  peripheral edema 2-3+ in legs/pedal, rate-normal  ABDOMEN:  bowel sounds normal  M/S:   Gait and station abnormal using walker  SKIN:  Inspection of skin and subcutaneous tissue baseline  NEURO:   Examination of sensation by touch normal  PSYCH:  affect and mood normal    Labs done in SNF are in [facility]. Please refer to them using [employer] Everywhere.    Assessment/Plan:  (I50.9) Chronic congestive heart failure, unspecified heart failure   (R60.0) Bilateral leg edema  Comment: BNP 4022, edema still 2-3+, mild hypervolemia  Plan:   -continue increase bumex 2mg, spironolactone, jardiance  -daily weights by  patient  -encourage elevation  -continue leg compression  -repeat BNP, BMP on 5/13    (N18.32) Chronic kidney disease, stage 3b (H)  Comment: creat up to 1.68, GFR 40  Plan:   -dose meds renally  -repeat BMP on 5/13  -if renal status declines again consider reducing diuretics    (D64.9) Anemia, unspecified type  Comment: Hgb 10.8, up from 8.8 in Feb  Plan:   -continue Fe supplement  -continue protonix BID  -CBC on 5/13    MED REC REQUIRED  Post Medication Reconciliation Status: medication reconcilation previously completed during another office visit        Electronically signed by: JITENDRA Freitas CNP

## 2025-05-05 NOTE — LETTER
3/22/2023        RE: Luz Elena Sampson  Attn Parmjit Sampson  [address]  [place] [address]        No notes on file      Sincerely,        JITENDRA Freitas CNP      
Medication: Trazodone passed protocol.   Last office visit date: 3/6/25  Next appointment scheduled?: Yes   Number of refills given: 1  
Walk in

## 2025-05-06 ENCOUNTER — VIRTUAL VISIT (OUTPATIENT)
Facility: CLINIC | Age: 85
End: 2025-05-06
Payer: MEDICARE

## 2025-05-06 DIAGNOSIS — I10 HYPERTENSION, UNSPECIFIED TYPE: ICD-10-CM

## 2025-05-06 DIAGNOSIS — I50.9 CHRONIC CONGESTIVE HEART FAILURE, UNSPECIFIED HEART FAILURE TYPE (H): Primary | ICD-10-CM

## 2025-05-06 NOTE — PROGRESS NOTES
"Medication Therapy Management (MTM) Encounter    ASSESSMENT:                            Medication Adherence/Access: No issues identified.    HFpEF / HTN:   Given patient's indication of HFpEF and elevated blood pressures, patient would benefit from initiation of Entresto 24-26 mg twice a day. Given the recent increase in patient's creatinine, PharmD will discuss with patient's provider prior to initiation. Educated patient on indication and side effects of the medication. Answered patient questions. Recommend checking BMP two weeks after initiation of Entresto.     Addendum 5/7/25: discussed with Zhanna Bhardwaj CNP: \"Looks like the slight bump isn't too far off from where he has kind of been sitting and his BP is certainly still elevated, so I'm okay with starting the Entresto before the next labs. He has a CORE visit with Judie coming up on 6/6, so that would be a good time to reassess his labs again and continue to make adjustments.\"  Start Entresto 24-26 mg twice daily. Continue with plan per PCP to check BMP next week. Will also plan for BMP check in 1 month as cardiology follow up appointment with Judie.     PLAN:                            Pharamcist will reach out to cardiology team to discuss plan to start taking Entresto 24-26 mg twice a day and plan to recheck labs.   Addendum 5/7/25: start Entrtesto 24-26 mg twice daily. It looks like Dr. Oconnell is already planning to recheck your labs next week. We will also plan to recheck you labs at your cardiology appointment on 6/6/25.     Follow-up: Appointment with Olivia York (Cardiology PA) on 6/6/25, MTM 7/1 at 3 pm via phone     SUBJECTIVE/OBJECTIVE:                          Luz Elena Sampson is a 84 year old male seen for an initial visit. He was referred to me from Zhanna Bhardwaj (NP). Patient was accompanied by  his son Parmjit. Patient lives in an assisted living ([facility]), and he has a nurse that sets up his medications weekly. Patient admits " he is not familiar with his medication name, so complete medication reconciliation was deferred at this time.     Reason for visit: HFpEF, GDMT intiation, Entresto initiation      Allergies/ADRs: Reviewed in chart  Past Medical History: Reviewed in chart  Tobacco: He reports that he has quit smoking. His smoking use included cigarettes, pipe, and cigars. He has never used smokeless tobacco.      Medication Adherence/Access: no issues reported.    Heart Failure w/pEF/HTN:  SGLT2i: Jardiance 10 mg daily  MRA: Spironolactone 12.5 mg daily   Diuretic(s): Bumex 2 mg daily     Patient does not self-monitor blood pressure. He gets his blood pressure checked by a doctor at his assisted living about once a month.     Patient started Jardiance about a week ago. Denies any lightheadedness or increased urinary frequency since starting. Patient endorses swelling in his legs but reports it's improving.     Per chart review, appears copay for Entresto will be $35. Patient denies any financial barrier to obtaining Entresto.     Per chart review, patient's creatinine has recently increased from 1.34 to 1.68.    BP Readings from Last 3 Encounters:   04/16/25 (!) 158/84   04/08/25 (!) 158/70   04/02/25 (!) 158/84        ----------------  I spent 15 minutes with this patient today. All changes were made via collaborative practice agreement with Zhanna Bhardwaj CNP.     A summary of these recommendations was sent via [employer].    Rupal Estrada, ArelisD  Medication Therapy Management Pharmacist  [facility]    Rosita Marte, PharmD  Medication Therapy Management Pharmacist   [facility]    Telemedicine Visit Details  The patient's medications can be safely assessed via a telemedicine encounter.  Type of service:  Telephone visit  Originating Location (pt. Location): Home    Distant Location (provider location):  Off-site  Start Time: 3:00 PM  End Time: 3:15 PM

## 2025-05-06 NOTE — PATIENT INSTRUCTIONS
"Recommendations from today's MTM visit:                                                      Pharamcist will reach out to cardiology team to discuss plan to start taking Entresto 24-26 mg twice a day and recheck labs two weeks after starting of Entresto.      Follow-up: Has appointment with Olivia York (Cardiology PA) on 6/6/25, MTM 7/1 at 3 pm via phone     It was great speaking with you today.  I value your experience and would be very thankful for your time in providing feedback in our clinic survey. In the next few days, you may receive an email or text message from [employer] with a link to a survey related to your  clinical pharmacist.\"     To schedule another MTM appointment, please call the clinic directly or you may call the MTM scheduling line at [phone].    My Clinical Pharmacist's contact information:                                                      Please feel free to contact me with any questions or concerns you have.      Rupal Estrada, PharmD  Medication Therapy Management Pharmacist  [facility]   "

## 2025-05-07 ENCOUNTER — LAB REQUISITION (OUTPATIENT)
Dept: LAB | Facility: CLINIC | Age: 85
End: 2025-05-07
Payer: MEDICARE

## 2025-05-07 DIAGNOSIS — I50.9 HEART FAILURE, UNSPECIFIED (H): ICD-10-CM

## 2025-05-07 DIAGNOSIS — D64.9 ANEMIA, UNSPECIFIED: ICD-10-CM

## 2025-05-07 DIAGNOSIS — N18.9 CHRONIC KIDNEY DISEASE, UNSPECIFIED: ICD-10-CM

## 2025-05-07 RX ORDER — SACUBITRIL AND VALSARTAN 24; 26 MG/1; MG/1
1 TABLET, FILM COATED ORAL 2 TIMES DAILY
Qty: 60 TABLET | Refills: 11 | Status: SHIPPED | OUTPATIENT
Start: 2025-05-07

## 2025-05-13 LAB
ANION GAP SERPL CALCULATED.3IONS-SCNC: 11 MMOL/L (ref 7–15)
BUN SERPL-MCNC: 32 MG/DL (ref 8–23)
CALCIUM SERPL-MCNC: 9.1 MG/DL (ref 8.8–10.4)
CHLORIDE SERPL-SCNC: 94 MMOL/L (ref 98–107)
CREAT SERPL-MCNC: 1.97 MG/DL (ref 0.67–1.17)
EGFRCR SERPLBLD CKD-EPI 2021: 33 ML/MIN/1.73M2
ERYTHROCYTE [DISTWIDTH] IN BLOOD BY AUTOMATED COUNT: 14.6 % (ref 10–15)
HCO3 SERPL-SCNC: 24 MMOL/L (ref 22–29)
HCT VFR BLD AUTO: 39.5 % (ref 40–53)
HGB BLD-MCNC: 12.4 G/DL (ref 13.3–17.7)
MCH RBC QN AUTO: 26.9 PG (ref 26.5–33)
MCHC RBC AUTO-ENTMCNC: 31.4 G/DL (ref 31.5–36.5)
MCV RBC AUTO: 86 FL (ref 78–100)
NT-PROBNP SERPL-MCNC: 2793 PG/ML (ref 0–852)
PLATELET # BLD AUTO: 154 10E3/UL (ref 150–450)
POTASSIUM SERPL-SCNC: 5.3 MMOL/L (ref 3.4–5.3)
RBC # BLD AUTO: 4.61 10E6/UL (ref 4.4–5.9)
SODIUM SERPL-SCNC: 129 MMOL/L (ref 135–145)
WBC # BLD AUTO: 4.1 10E3/UL (ref 4–11)

## 2025-05-14 ENCOUNTER — ASSISTED LIVING VISIT (OUTPATIENT)
Dept: GERIATRICS | Facility: CLINIC | Age: 85
End: 2025-05-14
Payer: MEDICARE

## 2025-05-14 VITALS
BODY MASS INDEX: 35.19 KG/M2 | TEMPERATURE: 97 F | DIASTOLIC BLOOD PRESSURE: 69 MMHG | SYSTOLIC BLOOD PRESSURE: 101 MMHG | RESPIRATION RATE: 16 BRPM | HEART RATE: 53 BPM | WEIGHT: 251.4 LBS | OXYGEN SATURATION: 95 % | HEIGHT: 71 IN

## 2025-05-14 DIAGNOSIS — I50.9 CHRONIC CONGESTIVE HEART FAILURE, UNSPECIFIED HEART FAILURE TYPE (H): Primary | ICD-10-CM

## 2025-05-14 DIAGNOSIS — N18.32 CHRONIC KIDNEY DISEASE, STAGE 3B (H): ICD-10-CM

## 2025-05-14 DIAGNOSIS — R60.0 BILATERAL LEG EDEMA: ICD-10-CM

## 2025-05-14 LAB — GLUCOSE SERPL-MCNC: 95 MG/DL (ref 70–99)

## 2025-05-14 PROCEDURE — 99349 HOME/RES VST EST MOD MDM 40: CPT | Performed by: NURSE PRACTITIONER

## 2025-05-14 NOTE — LETTER
5/14/2025      Luz Elena Sampson  Attn Parmjit Sampson  [address]  [place] [address]        M [facility] GERIATRICS    Chief Complaint   Patient presents with    RECHECK     HPI:  Luz Elena Sampson is a 84 year old  (1940), who is being seen today for an episodic care visit at: [facility] ([facility]) [[id number]]. Today's concern is: ***    Allergies, and PMH/PSH reviewed in [facility] today.  REVIEW OF SYSTEMS:  {rdfqvp51:925017}    Objective:   There were no vitals taken for this visit.  {senior living physical exam :847141}    {fgslab:993414}    Assessment/Plan:  {[facility] DX2:668545}    MED REC REQUIRED{TIP  Click the link below to document or use med rec list, use list to pull in response :122155}  Post Medication Reconciliation Status: {MED REC LIST:530722}      Orders:  {fgsorders:764176}  ***    Electronically signed by: Val Kemp ***        Sincerely,        JITENDRA Freitas CNP    Electronically signed

## 2025-05-14 NOTE — PROGRESS NOTES
"[facility] GERIATR[facility]    Chief Complaint   Patient presents with    RECHECK     HPI:  Luz Elena Sampson is a 84 year old  (1940), who is being seen today for an episodic care visit at: [facility] ([facility]) [[id number]]. Today's concern is:   1. Chronic congestive heart failure, unspecified heart failure type (H)    2. Bilateral leg edema    3. Chronic kidney disease, stage 3b (H)      Patient seen for follow up, reports having nausea, diarrhea and lethargy after starting Entresto, sleeping more, fatigue, taking naps and sleeping in, requesting to have Dc'd, also labs today BNP 2793, creat 1.97, SBP's in facility 100-130, weight down to 255lbs, think currently have an acceptable fluid balance between kidneys, cardiac, edema and SBP's.    Allergies, and PMH/PSH reviewed in [facility] today.  REVIEW OF SYSTEMS:  4 point ROS including Respiratory, CV, GI and , other than that noted in the HPI,  is negative    Objective:   /69   Pulse 53   Temp 97  F (36.1  C)   Resp 16   Ht 1.803 m (5' 11\")   Wt 114 kg (251 lb 6.4 oz)   SpO2 95%   BMI 35.06 kg/m    GENERAL APPEARANCE:  in no distress, appears healthy  ENT:  Mouth and posterior oropharynx normal, moist mucous membranes  RESP:  lungs clear to auscultation , no respiratory distress  CV:  peripheral edema 2+ in lower legs, rate-normal  ABDOMEN:  bowel sounds normal  M/S:   Gait and station abnormal unsteady  SKIN:  Inspection of skin and subcutaneous tissue baseline  NEURO:   Examination of sensation by touch normal  PSYCH:  affect and mood normal    Labs done in SNF are in [facility]. Please refer to them using EPIC/Care Everywhere.    Assessment/Plan:  (I50.9) Chronic congestive heart failure, unspecified heart failure type (H)  (primary encounter diagnosis)  (R60.0) Bilateral leg edema  Comment: BNP 2793, mild hypervolemia, edema 2+, SBP's <130  Plan:   -discontinue entresto, side effects  -continue bumex 2mg, spironolactone " 12.5 (K was 5.3)  -check VS as indicated  -repeat BNP 6/17  -maintain compression on legs 24/7 as tolerates, elevate  -continue xarelto 15mg  -follow up CORE PRN      (N18.32) Chronic kidney disease, stage 3b (H)  Comment: creat 1.97, GFR 33  Plan:   -dose meds renally as possible  -repeat BMP on 6/17    MED REC REQUIRED  Post Medication Reconciliation Status: medication reconcilation previously completed during another office visit        Electronically signed by: JITENDRA Freitas CNP

## 2025-05-20 VITALS
WEIGHT: 251 LBS | TEMPERATURE: 97 F | DIASTOLIC BLOOD PRESSURE: 69 MMHG | BODY MASS INDEX: 35.01 KG/M2 | SYSTOLIC BLOOD PRESSURE: 101 MMHG | HEART RATE: 53 BPM | RESPIRATION RATE: 16 BRPM

## 2025-05-21 ENCOUNTER — ASSISTED LIVING VISIT (OUTPATIENT)
Dept: GERIATRICS | Facility: CLINIC | Age: 85
End: 2025-05-21
Payer: MEDICARE

## 2025-05-21 DIAGNOSIS — Z00.00 ENCOUNTER FOR MEDICARE ANNUAL WELLNESS EXAM: ICD-10-CM

## 2025-05-21 DIAGNOSIS — I50.30 HEART FAILURE WITH PRESERVED EJECTION FRACTION, UNSPECIFIED HF CHRONICITY (H): ICD-10-CM

## 2025-05-21 DIAGNOSIS — I50.9 CHRONIC CONGESTIVE HEART FAILURE, UNSPECIFIED HEART FAILURE TYPE (H): Primary | ICD-10-CM

## 2025-05-21 DIAGNOSIS — R60.0 BILATERAL LEG EDEMA: ICD-10-CM

## 2025-05-21 DIAGNOSIS — I10 HYPERTENSION, UNSPECIFIED TYPE: ICD-10-CM

## 2025-05-21 DIAGNOSIS — N18.32 CHRONIC KIDNEY DISEASE, STAGE 3B (H): ICD-10-CM

## 2025-05-21 PROCEDURE — G0438 PPPS, INITIAL VISIT: HCPCS | Performed by: NURSE PRACTITIONER

## 2025-05-21 PROCEDURE — 99349 HOME/RES VST EST MOD MDM 40: CPT | Mod: 25 | Performed by: NURSE PRACTITIONER

## 2025-05-21 NOTE — PATIENT INSTRUCTIONS
Heart Failure: Care Instructions  Overview     Heart failure occurs when your heart does not pump as much blood as the body needs. Failure does not mean that the heart has stopped pumping but rather that it is not pumping as well as it should. Over time, this causes fluid buildup in your lungs and other parts of your body. Fluid buildup can cause shortness of breath, fatigue, swollen ankles, and other problems. Heart failure is treated with medicines, a heart-healthy lifestyle, and the steps you take to check your symptoms. Treatment can slow the disease, help you feel better, and help keep you out of the hospital. Treatment may also help you live longer.  Follow-up care is a key part of your treatment and safety. Be sure to make and go to all appointments, and call your doctor if you are having problems. It's also a good idea to know your test results and keep a list of the medicines you take.  How can you care for yourself at home?  Medicines    Be safe with medicines. Take your medicines exactly as prescribed. Call your doctor if you think you are having a problem with your medicine.     You will get more details on the specific medicines your doctor prescribes. Medicines can help your heart work better, help you feel better, and help keep you out of the hospital. Medicines may also help you live longer.     Talk with your doctor or pharmacist before you take a new prescription or over-the-counter medicine. Ask if the medicine is safe for you to take. Some medicines can affect your heart and make heart failure worse. Others may keep your heart failure medicines from working right. Over-the-counter medicines that you may need to avoid include herbal supplements, vitamins, pain relievers called NSAIDs, antacids, laxatives, and cough, cold, flu, or sinus medicine.   Diet    Eat heart-healthy foods. These foods include vegetables, fruits, nuts, beans, lean meat, fish, and whole grains.     Your doctor may suggest  that you limit sodium. Your doctor can tell you how much sodium is right for you. An example is less than 3,000 mg a day. This includes all the salt you eat in cooking or in packaged foods. People get most of their sodium from processed foods. Fast food and restaurant meals also tend to be very high in sodium.     Limit your fluid intake if your doctor tells you to. Your doctor will tell you how much fluid you can have in a day.   Symptoms    Weigh yourself without clothing at the same time each day. Record your weight. Call your doctor if you have a sudden weight gain, such as more than 2 to 3 pounds in a day or 5 pounds in a week. (Your doctor may suggest a different range of weight gain.) A sudden weight gain may mean that your heart failure is getting worse.     Check your symptoms every day to watch for changes. Know what to do if your symptoms get worse.   Activity    Be active. Do not start to exercise until you have talked with your doctor. Together you can make an exercise program that is enjoyable and safe for you. Regular exercise can make your heart and your body stronger. Being active can help you feel better too.     With your doctor, plan how often, how long, and how hard you will be active. Don't exercise too hard because it can put stress on your heart.     If your doctor has not set you up with a cardiac rehabilitation (rehab) program, ask if it's right for you. Cardiac rehab can give you education and support that help you stay as healthy as possible.     When you exercise, watch for signs that your heart is working too hard. You are pushing yourself too hard if you cannot talk while you are exercising. If you become short of breath or dizzy or have chest pain, stop, sit down, and rest.   Heart-healthy lifestyle    Do not smoke. Smoking can make a heart condition worse. If you need help quitting, talk to your doctor about stop-smoking programs and medicines. These can increase your chances of  "quitting for good. Quitting smoking may be the most important step you can take to protect your heart.     Stay at a healthy weight. Lose weight if you need to.     Manage other health problems such as diabetes and high blood pressure.     Limit or avoid alcohol. Ask your doctor how much alcohol, if any, is safe for you.     If you think you may have a problem with alcohol or drug use, talk to your doctor.     Avoid infections such as COVID-19, colds, and the flu. Get the flu vaccine every year. Get a pneumococcal vaccine shot. If you have had one before, ask your doctor whether you need another dose. Stay up to date on your COVID-19 vaccines.   When should you call for help?   Call 911  if you have symptoms of sudden heart failure such as:    You have severe trouble breathing.     You cough up pink, foamy mucus.     You have a new irregular or rapid heartbeat.   Call your doctor now or seek immediate medical care if:    You have new or increased shortness of breath.     You are dizzy or lightheaded, or you feel like you may faint.     You have sudden weight gain, such as more than 2 to 3 pounds in a day or 5 pounds in a week. (Your doctor may suggest a different range of weight gain.)     You have increased swelling in your legs, ankles, or feet.     You are suddenly so tired or weak that you cannot do your usual activities.   Watch closely for changes in your health, and be sure to contact your doctor if you develop new symptoms.  Where can you learn more?  Go to https://www.[employer].net/patiented  Enter E597 in the search box to learn more about \"Heart Failure: Care Instructions.\"  Current as of: July 31, 2024  Content Version: 14.4    6199-0085 [employer].   Care instructions adapted under license by your healthcare professional. If you have questions about a medical condition or this instruction, always ask your healthcare professional. [employer] disclaims any warranty or liability " for your use of this information.    My Heart Failure Action Plan  Name: Luz Elena Sampson   YOB: 1940  Date: 5/21/2025   My doctor:   [facility]   [address] [place] [address] [phone] My Diagnosis: HF-pEF (EF > 40%)  My Ejection Fraction:   Lab Results   Component Value Date    LVEF 60-65% 04/08/2025     Over 50%  My Exercise Goal: Start exercise slowly - to begin, do a few minutes of exercise, several times a day. Increase your time and speed [id number] to build tolerance, with a goal of 30 minutes of exercise daily. Steady, slow, and consistent exercise is both safe and healthy. Stop and rest when you feel tired or become short of breath. Do not push yourself on days when you don t feel well.       My Weight Plan:   Wt Readings from Last 2 Encounters:   05/20/25 113.9 kg (251 lb)   05/14/25 114 kg (251 lb 6.4 oz)     Weigh yourself daily using the same scale. If you gain more than 2 pounds in 24 hours or 5 pounds in 7 days. Continue bumex    My Diet Goal: No added salt    Emergency Room Visits:    Our goal is to improve your quality of life and help you avoid a visit to the emergency room or hospital.  If we work together, we can achieve this goal. But, if you feel you need to call 911 or go to the emergency room, please do so.  If you go to the emergency room, please bring your list of medicines and your daily weight chart with you.    Each day ask yourself:  Is my weight up?  Do I have swelling?  Do I have trouble breathing?  How did I sleep?  Other problems?       GREEN ZONE     Weight gained is no more than 2 pounds a day or 5 pounds a in 7 days.  No swelling in feet, ankles, legs or stomach.  No more swelling than usual.  No more trouble breathing than usual.  No change in my sleep.  No other problems. What should I do?  I am doing fine. I will take my medicine, follow my diet, see my doctor, exercise, and watch for  symptoms.           YELLOW ZONE         Weight gain of more than 2 pounds in one day or 5 pounds in 7 days.  New swelling in ankle, leg, knee or thigh.  Bloating in belly, pants feel tighter.  Swelling in hands or face.  Coughing or trouble breathing while walking or talking.  Harder to breathe last night.  Have trouble sleeping, wake up short of breath.  Unusually tired.  Not eating.  Nausea, vomiting, or diarrhea  Pain in my chest or bad  leg cramps.  Feel weak or dizzy. What should I do?  I need to take action and call my doctor or nurse today.                 RED ZONE         Weight gain of 5 pounds overnight.  Chest pain or pressure that does not go away.  Feel less alert.  Wheezing or have trouble breathing when at rest.  Cannot sleep lying down.  Cannot take my medicines.  Pass out or faint. What should I do?  I need to call my doctor or nurse now!  Call 911 if I have chest pain or cannot breathe.      Patient Education   Preventive Care Advice   This is general advice given by our system to help you stay healthy. However, your care team may have specific advice just for you. Please talk to your care team about your preventive care needs.  Nutrition  Eat 5 or more servings of fruits and vegetables each day.  Try wheat bread, brown rice and whole grain pasta (instead of white bread, rice, and pasta).  Get enough calcium and vitamin D. Check the label on foods and aim for 100% of the RDA (recommended daily allowance).  Lifestyle  Exercise at least 150 minutes each week  (30 minutes a day, 5 days a week).  Do muscle strengthening activities 2 days a week. These help control your weight and prevent disease.  No smoking.  Wear sunscreen to prevent skin cancer.  Have a dental exam and cleaning every 6 months.  Yearly exams  See your health care team every year to talk about:  Any changes in your health.  Any medicines your care team has prescribed.  Preventive care, family planning, and ways to prevent chronic  diseases.  Shots (vaccines)   HPV shots (up to age 26), if you've never had them before.  Hepatitis B shots (up to age 59), if you've never had them before.  COVID-19 shot: Get this shot when it's due.  Flu shot: Get a flu shot every year.  Tetanus shot: Get a tetanus shot every 10 years.  Pneumococcal, hepatitis A, and RSV shots: Ask your care team if you need these based on your risk.  Shingles shot (for age 50 and up)  General health tests  Diabetes screening:  Starting at age 35, Get screened for diabetes at least every 3 years.  If you are younger than age 35, ask your care team if you should be screened for diabetes.  Cholesterol test: At age 39, start having a cholesterol test every 5 years, or more often if advised.  Bone density scan (DEXA): At age 50, ask your care team if you should have this scan for osteoporosis (brittle bones).  Hepatitis C: Get tested at least once in your life.  STIs (sexually transmitted infections)  Before age 24: Ask your care team if you should be screened for STIs.  After age 24: Get screened for STIs if you're at risk. You are at risk for STIs (including HIV) if:  You are sexually active with more than one person.  You don't use condoms every time.  You or a partner was diagnosed with a sexually transmitted infection.  If you are at risk for HIV, ask about PrEP medicine to prevent HIV.  Get tested for HIV at least once in your life, whether you are at risk for HIV or not.  Cancer screening tests  Cervical cancer screening: If you have a cervix, begin getting regular cervical cancer screening tests starting at age 21.  Breast cancer scan (mammogram): If you've ever had breasts, begin having regular mammograms starting at age 40. This is a scan to check for breast cancer.  Colon cancer screening: It is important to start screening for colon cancer at age 45.  Have a colonoscopy test every 10 years (or more often if you're at risk) Or, ask your provider about stool tests like a  FIT test every year or Cologuard test every 3 years.  To learn more about your testing options, visit:   .  For help making a decision, visit:   https://bit.ly/pp61462.  Prostate cancer screening test: If you have a prostate, ask your care team if a prostate cancer screening test (PSA) at age 55 is right for you.  Lung cancer screening: If you are a current or former smoker ages 50 to 80, ask your care team if ongoing lung cancer screenings are right for you.  For informational purposes only. Not to replace the advice of your health care provider. Copyright   2023 [employer]. All rights reserved. Clinically reviewed by the [facility]. [employer] 175726 - REV 01/24.  Eating Healthy Foods: Care Instructions  With every meal, you can make healthy food choices. Try to eat a variety of fruits, vegetables, whole grains, lean proteins, and low-fat dairy products. This can help you get the right balance of nutrients, including vitamins and minerals. Small changes add up over time. You can start by adding one healthy food to your meals each day.    Try to make half your plate fruits and vegetables, one-fourth whole grains, and one-fourth lean proteins. Try including dairy with your meals.   Eat more fruits and vegetables. Try to have them with most meals and snacks.   Foods for healthy eating        Fruits   These can be fresh, frozen, canned, or dried.  Try to choose whole fruit rather than fruit juice.  Eat a variety of colors.        Vegetables   These can be fresh, frozen, canned, or dried.  Beans, peas, and lentils count too.        Whole grains   Choose whole-grain breads, cereals, and noodles.  Try brown rice.        Lean proteins   These can include lean meat, poultry, fish, and eggs.  You can also have tofu, beans, peas, lentils, nuts, and seeds.        Dairy   Try milk, yogurt, and cheese.  Choose low-fat or fat-free when you can.  If you need to, use lactose-free milk or  "fortified plant-based milk products, such as soy milk.        Water   Drink water when you're thirsty.  Limit sugar-sweetened drinks, including soda, fruit drinks, and sports drinks.  Where can you learn more?  Go to https://www.[employer].net/patiented  Enter T756 in the search box to learn more about \"Eating Healthy Foods: Care Instructions.\"  Current as of: October 7, 2024  Content Version: 14.4    4936-8502 [employer].   Care instructions adapted under license by your healthcare professional. If you have questions about a medical condition or this instruction, always ask your healthcare professional. [employer] disclaims any warranty or liability for your use of this information.       "

## 2025-05-21 NOTE — PROGRESS NOTES
"Preventive Care Visit  [facility] GERIATRIC SERVICES  Chandler Oconnell, APRN CNP, Geriatric Medicine  May 21, 2025      Assessment & Plan     (I50.9) Chronic congestive heart failure, unspecified heart failure type (H)  (primary encounter diagnosis)  (R60.0) Bilateral leg edema  Comment: moderate hypervolemia, edema 2-3+, BNP 2793  Plan:   -continue bumex 2mg  -elevate legs as possible  -wear compression wraps HS and/or 24/7 if tolerates  -started on jardiance per CORE; continue  -BMP, BNP, A1C, PSA on 6/17    (N18.32) Chronic kidney disease, stage 3b (H)  Comment: creat 1.97, GFR 33  Plan:   -dose meds renally  -encourage fluids  -BMP on 6/17    (I10) Hypertension, unspecified type  Comment: SBP's 100 range  Plan:   -continue bumex, spironolactone  -check vitals PRN    BMI  Estimated body mass index is 35.01 kg/m  as calculated from the following:    Height as of 5/14/25: 1.803 m (5' 11\").    Weight as of this encounter: 113.9 kg (251 lb).   Weight management plan: Discussed healthy diet and exercise guidelines        Dominga Laguna is a 84 year old, presenting for the following:  Wellness Visit            HPI  Patient 83yo male, post SDH, afib, CHF, edema, RLS, CKD with hypervolemia, independent most ADL's, oriented, doing well in snf facility with assist.         Advance Care Planning    Document on file is a Health Care Directive or POLST.         No data to display                   No data to display                   No data to display                      No data to display                   No data to display                   No data to display                     No data to display                    Today's PHQ-2 Score:       5/21/2025     2:32 PM   PHQ-2 ( 1999 [employer])   Q1: Little interest or pleasure in doing things 0   Q2: Feeling down, depressed or hopeless 0   PHQ-2 Score 0          No data to display              Social History     Tobacco Use    Smoking status: Former     " Types: Cigarettes, Pipe, Cigars    Smokeless tobacco: Never   Substance Use Topics    Alcohol use: Yes     Comment: 2 beers per evening    Drug use: No                 Reviewed and updated as needed this visit by Provider                    Past Medical History:   Diagnosis Date    Atrial fibrillation (H)     Chronic kidney disease     GERD (gastroesophageal reflux disease)     Hyperlipidemia     Hypertension     Hypothyroid     Psoriasis     Syncope 1/29/2023     Labs reviewed in [facility]  Current Outpatient Medications   Medication Sig Dispense Refill    ACE/ARB/ARNI NOT PRESCRIBED (INTENTIONAL) Please choose reason not prescribed from choices below.      bumetanide (BUMEX) 2 MG tablet Take 1 tablet (2 mg) by mouth daily. 90 tablet 3    carbidopa-levodopa (SINEMET)  MG tablet Take 1 tablet by mouth nightly as needed (RLS). 1 tablet 0    empagliflozin (JARDIANCE) 10 MG TABS tablet Take 1 tablet (10 mg) by mouth daily. 90 tablet 1    ferrous sulfate (FEROSUL) 325 (65 Fe) MG tablet Take 325 mg by mouth daily.      gabapentin (NEURONTIN) 300 MG capsule Take 1 capsule (300 mg) by mouth 2 times daily.      guaiFENesin (MUCINEX) 600 MG 12 hr tablet TAKE ONE TABLET BY MOUTH TWICE DAILY FOR COUGH *VIALS* 60 tablet 11    levothyroxine (SYNTHROID/LEVOTHROID) 125 MCG tablet Take 1 tablet (125 mcg) by mouth daily      mometasone (NASONEX) 50 MCG/ACT nasal spray Spray 2 sprays into both nostrils daily.      ORDER FOR [facility] Wheelchair; Right elevating leg rest  [employer]  Phone: [phone]  Diagnosis: Gait Instability related to Right Ankle Fusion 1 Device 0    pantoprazole (PROTONIX) 40 MG EC tablet Take 1 tablet (40 mg) by mouth 2 times daily.      rivaroxaban ANTICOAGULANT (XARELTO) 15 MG TABS tablet Take 1 tablet (15 mg) by mouth daily (with dinner). 90 tablet 3    simvastatin (ZOCOR) 20 MG tablet TAKE ONE TABLET BY MOUTH ONCE DAILY *VIALS* 30 tablet 11    spironolactone (ALDACTONE) 25 MG tablet Take 0.5 tablets (12.5 mg) by  mouth daily. 45 tablet 3    tamsulosin (FLOMAX) 0.4 MG capsule Take 1 capsule (0.4 mg) by mouth every evening 90 capsule 3     Allergies   Allergen Reactions    Warfarin Itching     Alopecia    Nsaids Nephrotoxicity     Patient avoids due to kidney function     Current providers sharing in care for this patient include:  Patient Care Team:  Chandler Oconnell APRN CNP as PCP - General (Geriatric Medicine)  Ever Montero as Family Medicine Physician  ([facility]), [facility] - Patrick Waters MD as MD (Ophthalmology)  Chandler Oconnell APRN CNP as Assigned PCP  Roge Romero MD as MD (Cardiovascular Disease)  Zhanna Bhardwaj APRN CNP as Assigned Heart and Vascular Provider  Rupal Estrada [facility] as Pharmacist (Pharmacist)    The following health maintenance items are reviewed in Epic and correct as of today:  Health Maintenance   Topic Date Due    ALT  Never done    LIPID  Never done    MICROALBUMIN  Never done    PARATHYROID  Never done    PHOSPHORUS  Never done    DIABETIC FOOT EXAM  Never done    URINALYSIS  Never done    ALK PHOS  Never done    ZOSTER IMMUNIZATION (2 of 3) 02/06/2009    RSV VACCINE (1 - 1-dose 75+ series) Never done    EYE EXAM  02/20/2025    COVID-19 Vaccine (12 - 2024-25 season) 05/06/2025    A1C  07/15/2025    BMP  08/24/2025    CBC  05/13/2026    HEMOGLOBIN  05/13/2026    MEDICARE ANNUAL WELLNESS VISIT  05/21/2026    FALL RISK ASSESSMENT  05/21/2026    ADVANCE CARE PLANNING  10/06/2027    HF ACTION PLAN  05/21/2028    DTAP/TDAP/TD IMMUNIZATION (4 - Td or Tdap) 02/01/2035    TSH W/FREE T4 REFLEX  Completed    PHQ-2 (once per calendar year)  Completed    INFLUENZA VACCINE  Completed    Pneumococcal Vaccine: 50+ Years  Completed    HPV IMMUNIZATION  Aged Out    MENINGITIS IMMUNIZATION  Aged Out         Review of Systems  CONSTITUTIONAL: NEGATIVE for fever, chills, change in weight  INTEGUMENTARY/SKIN: NEGATIVE for worrisome rashes, moles or  "lesions  EYES: NEGATIVE for vision changes or irritation  ENT/MOUTH: NEGATIVE for ear, mouth and throat problems  RESP: NEGATIVE for significant cough or SOB  BREAST: NEGATIVE for masses, tenderness or discharge  CV: NEGATIVE for chest pain, palpitations or peripheral edema  GI: NEGATIVE for nausea, abdominal pain, heartburn, or change in bowel habits  : NEGATIVE for frequency, dysuria, or hematuria  MUSCULOSKELETAL: NEGATIVE for significant arthralgias or myalgia  NEURO: NEGATIVE for weakness, dizziness or paresthesias  ENDOCRINE: NEGATIVE for temperature intolerance, skin/hair changes  HEME: NEGATIVE for bleeding problems  PSYCHIATRIC: NEGATIVE for changes in mood or affect     Objective    Exam  /69   Pulse 53   Temp 97  F (36.1  C)   Resp 16   Wt 113.9 kg (251 lb)   BMI 35.01 kg/m     Estimated body mass index is 35.01 kg/m  as calculated from the following:    Height as of 5/14/25: 1.803 m (5' 11\").    Weight as of this encounter: 113.9 kg (251 lb).    Physical Exam  GENERAL: alert and no distress  EYES: Eyes grossly normal to inspection, PERRL and conjunctivae and sclerae normal  HENT: ear canals and TM's normal, nose and mouth without ulcers or lesions  NECK: no adenopathy, no asymmetry, masses, or scars  RESP: lungs clear to auscultation - no rales, rhonchi or wheezes  CV: regular rate and rhythm, normal S1 S2, no S3 or S4, no murmur, click or rub, no peripheral edema  ABDOMEN: soft, nontender, no hepatosplenomegaly, no masses and bowel sounds normal  MS: no gross musculoskeletal defects noted, no edema  SKIN: no suspicious lesions or rashes  NEURO: Normal strength and tone, mentation intact and speech normal  PSYCH: mentation appears normal, affect normal/bright         5/21/2025   Mini Cog   Clock Draw Score 2 Normal   3 Item Recall 2 objects recalled   Mini Cog Total Score 4              Signed Electronically by: JITENDRA Freitas CNP    "

## 2025-05-21 NOTE — LETTER
" 5/21/2025      Luz Elena Capellanl  Attn Parmjit Capellanl  [address]  [place] [address]        Preventive Care Visit  [facility] GERIATRIC SERVICES  JITENDRA Freitas CNP, Geriatric Medicine  May 21, 2025      Assessment & Plan    (I50.9) Chronic congestive heart failure, unspecified heart failure type (H)  (primary encounter diagnosis)  (R60.0) Bilateral leg edema  Comment: moderate hypervolemia, edema 2-3+, BNP 2793  Plan:   -continue bumex 2mg  -elevate legs as possible  -wear compression wraps HS and/or 24/7 if tolerates  -started on jardiance per CORE; continue  -BMP, BNP, A1C, PSA on 6/17    (N18.32) Chronic kidney disease, stage 3b (H)  Comment: creat 1.97, GFR 33  Plan:   -dose meds renally  -encourage fluids  -BMP on 6/17    (I10) Hypertension, unspecified type  Comment: SBP's 100 range  Plan:   -continue bumex, spironolactone  -check vitals PRN    BMI  Estimated body mass index is 35.01 kg/m  as calculated from the following:    Height as of 5/14/25: 1.803 m (5' 11\").    Weight as of this encounter: 113.9 kg (251 lb).   Weight management plan: Discussed healthy diet and exercise guidelines        Dominga Laguna is a 84 year old, presenting for the following:  Wellness Visit            HPI  Patient 83yo male, post SDH, afib, CHF, edema, RLS, CKD with hypervolemia, independent most ADL's, oriented, doing well in [facility] facility with assist.         Advance Care Planning    Document on file is a Health Care Directive or POLST.         No data to display                   No data to display                   No data to display                      No data to display                   No data to display                   No data to display                     No data to display                    Today's PHQ-2 Score:       5/21/2025     2:32 PM   PHQ-2 ( 1999 [employer])   Q1: Little interest or pleasure in doing things 0   Q2: Feeling down, depressed or hopeless 0   PHQ-2 Score 0          No data " to display              Social History     Tobacco Use     Smoking status: Former     Types: Cigarettes, Pipe, Cigars     Smokeless tobacco: Never   Substance Use Topics     Alcohol use: Yes     Comment: 2 beers per evening     Drug use: No                 Reviewed and updated as needed this visit by Provider                    Past Medical History:   Diagnosis Date     Atrial fibrillation (H)      Chronic kidney disease      GERD (gastroesophageal reflux disease)      Hyperlipidemia      Hypertension      Hypothyroid      Psoriasis      Syncope 1/29/2023     [facility]  Current Outpatient Medications   Medication Sig Dispense Refill     ACE/ARB/ARNI NOT PRESCRIBED (INTENTIONAL) Please choose reason not prescribed from choices below.       bumetanide (BUMEX) 2 MG tablet Take 1 tablet (2 mg) by mouth daily. 90 tablet 3     carbidopa-levodopa (SINEMET)  MG tablet Take 1 tablet by mouth nightly as needed (RLS). 1 tablet 0     empagliflozin (JARDIANCE) 10 MG TABS tablet Take 1 tablet (10 mg) by mouth daily. 90 tablet 1     ferrous sulfate (FEROSUL) 325 (65 Fe) MG tablet Take 325 mg by mouth daily.       gabapentin (NEURONTIN) 300 MG capsule Take 1 capsule (300 mg) by mouth 2 times daily.       guaiFENesin (MUCINEX) 600 MG 12 hr tablet TAKE ONE TABLET BY MOUTH TWICE DAILY FOR COUGH *VIALS* 60 tablet 11     levothyroxine (SYNTHROID/LEVOTHROID) 125 MCG tablet Take 1 tablet (125 mcg) by mouth daily       mometasone (NASONEX) 50 MCG/ACT nasal spray Spray 2 sprays into both nostrils daily.       ORDER FOR [facility] Wheelchair; Right elevating leg rest  [employer]  Phone: [phone]  Diagnosis: Gait Instability related to Right Ankle Fusion 1 Device 0     pantoprazole (PROTONIX) 40 MG EC tablet Take 1 tablet (40 mg) by mouth 2 times daily.       rivaroxaban ANTICOAGULANT (XARELTO) 15 MG TABS tablet Take 1 tablet (15 mg) by mouth daily (with dinner). 90 tablet 3     simvastatin (ZOCOR) 20 MG tablet TAKE ONE TABLET BY  MOUTH ONCE DAILY *VIALS* 30 tablet 11     spironolactone (ALDACTONE) 25 MG tablet Take 0.5 tablets (12.5 mg) by mouth daily. 45 tablet 3     tamsulosin (FLOMAX) 0.4 MG capsule Take 1 capsule (0.4 mg) by mouth every evening 90 capsule 3     Allergies   Allergen Reactions     Warfarin Itching     Alopecia     Nsaids Nephrotoxicity     Patient avoids due to kidney function     Current providers sharing in care for this patient include:  Patient Care Team:  Chandler Oconnell APRN CNP as PCP - General (Geriatric Medicine)  Ever Montero as Family Medicine Physician  (Fgs), [facility] - Patrick Waters MD as MD (Ophthalmology)  Chandler Oconnell APRN CNP as Assigned PCP  Roge Romero MD as MD (Cardiovascular Disease)  Zhanna Bhardwaj APRN CNP as Assigned Heart and Vascular Provider  Rupal Estrada [facility] as Pharmacist (Pharmacist)    The following health maintenance items are reviewed in Epic and correct as of today:  Health Maintenance   Topic Date Due     ALT  Never done     LIPID  Never done     MICROALBUMIN  Never done     PARATHYROID  Never done     PHOSPHORUS  Never done     DIABETIC FOOT EXAM  Never done     URINALYSIS  Never done     ALK PHOS  Never done     ZOSTER IMMUNIZATION (2 of 3) 02/06/2009     RSV VACCINE (1 - 1-dose 75+ series) Never done     EYE EXAM  02/20/2025     COVID-19 Vaccine (12 - 2024-25 season) 05/06/2025     A1C  07/15/2025     BMP  08/24/2025     CBC  05/13/2026     HEMOGLOBIN  05/13/2026     MEDICARE ANNUAL WELLNESS VISIT  05/21/2026     FALL RISK ASSESSMENT  05/21/2026     ADVANCE CARE PLANNING  10/06/2027     HF ACTION PLAN  05/21/2028     DTAP/TDAP/TD IMMUNIZATION (4 - Td or Tdap) 02/01/2035     TSH W/FREE T4 REFLEX  Completed     PHQ-2 (once per calendar year)  Completed     INFLUENZA VACCINE  Completed     Pneumococcal Vaccine: 50+ Years  Completed     HPV IMMUNIZATION  Aged Out     MENINGITIS IMMUNIZATION  Aged Out  "        Review of Systems  CONSTITUTIONAL: NEGATIVE for fever, chills, change in weight  INTEGUMENTARY/SKIN: NEGATIVE for worrisome rashes, moles or lesions  EYES: NEGATIVE for vision changes or irritation  ENT/MOUTH: NEGATIVE for ear, mouth and throat problems  RESP: NEGATIVE for significant cough or SOB  BREAST: NEGATIVE for masses, tenderness or discharge  CV: NEGATIVE for chest pain, palpitations or peripheral edema  GI: NEGATIVE for nausea, abdominal pain, heartburn, or change in bowel habits  : NEGATIVE for frequency, dysuria, or hematuria  MUSCULOSKELETAL: NEGATIVE for significant arthralgias or myalgia  NEURO: NEGATIVE for weakness, dizziness or paresthesias  ENDOCRINE: NEGATIVE for temperature intolerance, skin/hair changes  HEME: NEGATIVE for bleeding problems  PSYCHIATRIC: NEGATIVE for changes in mood or affect     Objective   Exam  /69   Pulse 53   Temp 97  F (36.1  C)   Resp 16   Wt 113.9 kg (251 lb)   BMI 35.01 kg/m     Estimated body mass index is 35.01 kg/m  as calculated from the following:    Height as of 5/14/25: 1.803 m (5' 11\").    Weight as of this encounter: 113.9 kg (251 lb).    Physical Exam  GENERAL: alert and no distress  EYES: Eyes grossly normal to inspection, PERRL and conjunctivae and sclerae normal  HENT: ear canals and TM's normal, nose and mouth without ulcers or lesions  NECK: no adenopathy, no asymmetry, masses, or scars  RESP: lungs clear to auscultation - no rales, rhonchi or wheezes  CV: regular rate and rhythm, normal S1 S2, no S3 or S4, no murmur, click or rub, no peripheral edema  ABDOMEN: soft, nontender, no hepatosplenomegaly, no masses and bowel sounds normal  MS: no gross musculoskeletal defects noted, no edema  SKIN: no suspicious lesions or rashes  NEURO: Normal strength and tone, mentation intact and speech normal  PSYCH: mentation appears normal, affect normal/bright         5/21/2025   Mini Cog   Clock Draw Score 2 Normal   3 Item Recall 2 objects " recalled   Mini Cog Total Score 4              Signed Electronically by: JITENDRA Freitas CNP        Sincerely,        JITENDRA Freitas CNP    Electronically signed

## 2025-05-29 ENCOUNTER — TELEPHONE (OUTPATIENT)
Dept: CARDIOLOGY | Facility: CLINIC | Age: 85
End: 2025-05-29
Payer: MEDICARE

## 2025-05-29 ENCOUNTER — LAB REQUISITION (OUTPATIENT)
Dept: LAB | Facility: CLINIC | Age: 85
End: 2025-05-29
Payer: MEDICARE

## 2025-05-29 DIAGNOSIS — I50.9 HEART FAILURE, UNSPECIFIED (H): ICD-10-CM

## 2025-06-03 LAB
ANION GAP SERPL CALCULATED.3IONS-SCNC: 12 MMOL/L (ref 7–15)
BUN SERPL-MCNC: 27.4 MG/DL (ref 8–23)
CALCIUM SERPL-MCNC: 9.1 MG/DL (ref 8.8–10.4)
CHLORIDE SERPL-SCNC: 94 MMOL/L (ref 98–107)
CREAT SERPL-MCNC: 1.75 MG/DL (ref 0.67–1.17)
EGFRCR SERPLBLD CKD-EPI 2021: 38 ML/MIN/1.73M2
GLUCOSE SERPL-MCNC: 86 MG/DL (ref 70–99)
HCO3 SERPL-SCNC: 23 MMOL/L (ref 22–29)
POTASSIUM SERPL-SCNC: 4.7 MMOL/L (ref 3.4–5.3)
SODIUM SERPL-SCNC: 129 MMOL/L (ref 135–145)

## 2025-06-09 ENCOUNTER — RESULTS FOLLOW-UP (OUTPATIENT)
Dept: CARDIOLOGY | Facility: CLINIC | Age: 85
End: 2025-06-09
Payer: MEDICARE

## 2025-06-11 ENCOUNTER — LAB REQUISITION (OUTPATIENT)
Dept: LAB | Facility: CLINIC | Age: 85
End: 2025-06-11
Payer: MEDICARE

## 2025-06-11 DIAGNOSIS — E11.9 TYPE 2 DIABETES MELLITUS WITHOUT COMPLICATIONS (H): ICD-10-CM

## 2025-06-11 DIAGNOSIS — N18.9 CHRONIC KIDNEY DISEASE, UNSPECIFIED: ICD-10-CM

## 2025-06-11 DIAGNOSIS — I50.9 HEART FAILURE, UNSPECIFIED (H): ICD-10-CM

## 2025-06-17 LAB
ANION GAP SERPL CALCULATED.3IONS-SCNC: 13 MMOL/L (ref 7–15)
BUN SERPL-MCNC: 34.3 MG/DL (ref 8–23)
CALCIUM SERPL-MCNC: 9.2 MG/DL (ref 8.8–10.4)
CHLORIDE SERPL-SCNC: 94 MMOL/L (ref 98–107)
CREAT SERPL-MCNC: 2.06 MG/DL (ref 0.67–1.17)
EGFRCR SERPLBLD CKD-EPI 2021: 31 ML/MIN/1.73M2
EST. AVERAGE GLUCOSE BLD GHB EST-MCNC: 123 MG/DL
GLUCOSE SERPL-MCNC: 93 MG/DL (ref 70–99)
HBA1C MFR BLD: 5.9 %
HCO3 SERPL-SCNC: 25 MMOL/L (ref 22–29)
NT-PROBNP SERPL-MCNC: 4522 PG/ML (ref 0–852)
POTASSIUM SERPL-SCNC: 4.6 MMOL/L (ref 3.4–5.3)
PSA SERPL DL<=0.01 NG/ML-MCNC: 0.27 NG/ML
SODIUM SERPL-SCNC: 132 MMOL/L (ref 135–145)

## 2025-06-17 PROCEDURE — P9603 ONE-WAY ALLOW PRORATED MILES: HCPCS | Mod: ORL | Performed by: NURSE PRACTITIONER

## 2025-06-17 PROCEDURE — G0103 PSA SCREENING: HCPCS | Mod: ORL | Performed by: NURSE PRACTITIONER

## 2025-06-17 PROCEDURE — 36415 COLL VENOUS BLD VENIPUNCTURE: CPT | Mod: ORL | Performed by: NURSE PRACTITIONER

## 2025-06-17 PROCEDURE — 83036 HEMOGLOBIN GLYCOSYLATED A1C: CPT | Mod: ORL | Performed by: NURSE PRACTITIONER

## 2025-06-17 PROCEDURE — 80048 BASIC METABOLIC PNL TOTAL CA: CPT | Mod: ORL | Performed by: NURSE PRACTITIONER

## 2025-06-17 PROCEDURE — 83880 ASSAY OF NATRIURETIC PEPTIDE: CPT | Mod: ORL | Performed by: NURSE PRACTITIONER

## 2025-06-18 ENCOUNTER — ASSISTED LIVING VISIT (OUTPATIENT)
Dept: GERIATRICS | Facility: CLINIC | Age: 85
End: 2025-06-18
Payer: MEDICARE

## 2025-06-18 VITALS
RESPIRATION RATE: 22 BRPM | SYSTOLIC BLOOD PRESSURE: 105 MMHG | OXYGEN SATURATION: 98 % | HEART RATE: 59 BPM | DIASTOLIC BLOOD PRESSURE: 60 MMHG | TEMPERATURE: 96.1 F | WEIGHT: 252 LBS | BODY MASS INDEX: 35.15 KG/M2

## 2025-06-18 DIAGNOSIS — R60.0 BILATERAL LEG EDEMA: ICD-10-CM

## 2025-06-18 DIAGNOSIS — N18.32 CHRONIC KIDNEY DISEASE, STAGE 3B (H): ICD-10-CM

## 2025-06-18 DIAGNOSIS — I50.9 CHRONIC CONGESTIVE HEART FAILURE, UNSPECIFIED HEART FAILURE TYPE (H): Primary | ICD-10-CM

## 2025-06-18 PROCEDURE — 99349 HOME/RES VST EST MOD MDM 40: CPT | Performed by: NURSE PRACTITIONER

## 2025-06-18 NOTE — PROGRESS NOTES
[facility] GERIATRICS    Chief Complaint   Patient presents with    RECHECK     HPI:  Luz Elena Sampson is a 84 year old  (1940), who is being seen today for an episodic care visit at: [facility] ([facility]) [[id number]]. Today's concern is:   1. Chronic congestive heart failure, unspecified heart failure type (H)    2. Bilateral leg edema    3. Chronic kidney disease, stage 3b (H)      Patient seen for follow up, labs 6/17 with BNP 4522, creat 2.06, others WNL, hypervolemic, edema in legs/pedal 3+ with intact skin not weeping, SBP's 100 range, no dizziness noted, on bumex, jardiance and spironolactone, not compliant with leg elevation, overall reports feeling well, no pain.    Allergies, and PMH/PSH reviewed in EPIC today.  REVIEW OF SYSTEMS:  4 point ROS including Respiratory, CV, GI and , other than that noted in the HPI,  is negative    Objective:   /60   Pulse 59   Temp (!) 96.1  F (35.6  C)   Resp 22   Wt 114.3 kg (252 lb)   SpO2 98%   BMI 35.15 kg/m    GENERAL APPEARANCE:  in no distress, appears healthy  ENT:  Mouth and posterior oropharynx normal, moist mucous membranes  RESP:  lungs clear to auscultation , no respiratory distress  CV:  peripheral edema 3+ in lower legs/pedal, rate-normal  ABDOMEN:  bowel sounds normal  M/S:   Gait and station abnormal unsteady  SKIN:  Inspection of skin and subcutaneous tissue baseline  NEURO:   Examination of sensation by touch normal  PSYCH:  affect and mood normal    Labs done in SNF are in [facility]. Please refer to them using EPIC/Care Everywhere.    Assessment/Plan:  (I50.9) Chronic congestive heart failure, unspecified heart failure type (H)  (primary encounter diagnosis)  (R60.0) Bilateral leg edema  Comment: 3+ edema, BNP 4522, hypervolemic  Plan:   -continue bumex, jardiance, spironolactone  -OK to weigh self daily, records in room  -if weight >260lbs plan to give 3-5 day bumex bump  -wear compression, elevate  legs  -ordering lymph eval for ill-fitting wraps  -follow up cardiology PRN      (N18.32) Chronic kidney disease, stage 3b (H)  Comment: creat 2.06, GFR 31  Plan:   -continue bumex for CHF  -dose meds renally as possible  -repeat BMP/BNP in 2-3 months    MED REC REQUIRED  Post Medication Reconciliation Status: medication reconcilation previously completed during another office visit        Electronically signed by: JITENDRA Freitas CNP

## 2025-06-18 NOTE — LETTER
6/18/2025      Luz Elena Sampson  Attn Parmjit Sampson  [address]  [place] [address]        [facility] GERIATRICS    Chief Complaint   Patient presents with     RECHECK     HPI:  Luz Elena Sampson is a 84 year old  (1940), who is being seen today for an episodic care visit at: [facility] ([facility]) [[id number]]. Today's concern is:   1. Chronic congestive heart failure, unspecified heart failure type (H)    2. Bilateral leg edema    3. Chronic kidney disease, stage 3b (H)      Patient seen for follow up, labs 6/17 with BNP 4522, creat 2.06, others WNL, hypervolemic, edema in legs/pedal 3+ with intact skin not weeping, SBP's 100 range, no dizziness noted, on bumex, jardiance and spironolactone, not compliant with leg elevation, overall reports feeling well, no pain.    Allergies, and PMH/PSH reviewed in EPIC today.  REVIEW OF SYSTEMS:  4 point ROS including Respiratory, CV, GI and , other than that noted in the HPI,  is negative    Objective:   /60   Pulse 59   Temp (!) 96.1  F (35.6  C)   Resp 22   Wt 114.3 kg (252 lb)   SpO2 98%   BMI 35.15 kg/m    GENERAL APPEARANCE:  in no distress, appears healthy  ENT:  Mouth and posterior oropharynx normal, moist mucous membranes  RESP:  lungs clear to auscultation , no respiratory distress  CV:  peripheral edema 3+ in lower legs/pedal, rate-normal  ABDOMEN:  bowel sounds normal  M/S:   Gait and station abnormal unsteady  SKIN:  Inspection of skin and subcutaneous tissue baseline  NEURO:   Examination of sensation by touch normal  PSYCH:  affect and mood normal    Labs done in SNF are in [facility]. Please refer to them using [employer]/Care Everywhere.    Assessment/Plan:  (I50.9) Chronic congestive heart failure, unspecified heart failure type (H)  (primary encounter diagnosis)  (R60.0) Bilateral leg edema  Comment: 3+ edema, BNP 4522, hypervolemic  Plan:   -continue bumex, jardiance, spironolactone  -OK to weigh self daily,  records in room  -if weight >260lbs plan to give 3-5 day bumex bump  -wear compression, elevate legs  -ordering lymph eval for ill-fitting wraps  -follow up cardiology PRN      (N18.32) Chronic kidney disease, stage 3b (H)  Comment: creat 2.06, GFR 31  Plan:   -continue bumex for CHF  -dose meds renally as possible  -repeat BMP/BNP in 2-3 months    MED REC REQUIRED  Post Medication Reconciliation Status: medication reconcilation previously completed during another office visit        Electronically signed by: JITENDRA Freitas CNP          Sincerely,        JITENDRA Freitas CNP    Electronically signed

## 2025-07-01 ENCOUNTER — VIRTUAL VISIT (OUTPATIENT)
Facility: CLINIC | Age: 85
End: 2025-07-01
Payer: MEDICARE

## 2025-07-01 DIAGNOSIS — K21.9 GASTROESOPHAGEAL REFLUX DISEASE WITHOUT ESOPHAGITIS: ICD-10-CM

## 2025-07-01 DIAGNOSIS — D50.8 OTHER IRON DEFICIENCY ANEMIA: ICD-10-CM

## 2025-07-01 DIAGNOSIS — I48.19 ATRIAL FIBRILLATION, PERSISTENT (H): ICD-10-CM

## 2025-07-01 DIAGNOSIS — R05.9 COUGH: ICD-10-CM

## 2025-07-01 DIAGNOSIS — E78.5 DYSLIPIDEMIA: ICD-10-CM

## 2025-07-01 DIAGNOSIS — G25.81 RESTLESS LEGS SYNDROME (RLS): ICD-10-CM

## 2025-07-01 DIAGNOSIS — E03.9 HYPOTHYROIDISM, UNSPECIFIED TYPE: ICD-10-CM

## 2025-07-01 DIAGNOSIS — I50.9 CHRONIC CONGESTIVE HEART FAILURE, UNSPECIFIED HEART FAILURE TYPE (H): Primary | ICD-10-CM

## 2025-07-01 DIAGNOSIS — N40.1 BENIGN NON-NODULAR PROSTATIC HYPERPLASIA WITH LOWER URINARY TRACT SYMPTOMS: ICD-10-CM

## 2025-07-01 DIAGNOSIS — I10 HYPERTENSION, UNSPECIFIED TYPE: ICD-10-CM

## 2025-07-01 NOTE — PATIENT INSTRUCTIONS
"Recommendations from today's MTM visit:                                                      No medication changes     Follow-up: 6 months or sooner if needed     It was great speaking with you today.  I value your experience and would be very thankful for your time in providing feedback in our clinic survey. In the next few days, you may receive an email or text message from [facility] [employer] with a link to a survey related to your  clinical pharmacist.\"     To schedule another MTM appointment, please call the clinic directly or you may call the MTM scheduling line at [phone].    My Clinical Pharmacist's contact information:                                                      Please feel free to contact me with any questions or concerns you have.      Rupal Estrada, PharmD  Medication Therapy Management Pharmacist  [facility]   "

## 2025-07-01 NOTE — PROGRESS NOTES
Medication Therapy Management (MTM) Encounter    ASSESSMENT:                            Medication Adherence/Access: No issues identified.    HFpEF / hypertension / a fib   Patient did not tolerated Entresto and medication was discontinued by Dr. Oconnell. Blood pressure at goal of <130/80. Endorsing LE edema and currently wrapping legs daily, managed by assisted living. Cardiology follow up scheduled 7/31/25. Continue current medications.     Hyperlipidemia   Last LDL at goal of <100 mg/dL. Due for repeat lipid panel which was previously ordered.      GERD    Stable.      Hypothyroidism   Continue plan per PCP.    RLS   Controlled with S[facility].     Pain   Stable.     BPH  Stable.     Supplements   Stable.     Cough   Stable.     PLAN:                            No medication changes     Follow-up: 6 months or sooner if needed     SUBJECTIVE/OBJECTIVE:                          Luz Elena Sampson is a 84 year old male seen for an follow up visit.  Patient was accompanied by  his son Parmjit. Patient lives in an assisted living ([facility]), and he has a nurse that sets up his medications weekly.    Reason for visit: follow up      Allergies/ADRs: Reviewed in chart  Past Medical History: Reviewed in chart  Tobacco: He reports that he has quit smoking. His smoking use included cigarettes, pipe, and cigars. He has never used smokeless tobacco.    Medication Adherence/Access: no issues reported.    Heart Failure   HFpEF/hypertension/ a fib   ACE/ARB/ARNi: Entresto discontinued due to reported side effects of nausea, diarrhea and lethargy. Side effects have now resolved.   SGLT2i: Jardiance 10 mg daily  MRA: Spironolactone 12.5 mg daily   Diuretic(s): Bumex 2 mg daily   Xarelto 15 mg daily     Patient denies any medication side effects. Notices he will bleed easier when he cuts himself but no other bleeding concerns. Denies any dizziness or lightheadedness.   Denies any SOB or difficulty breathing. Reports LE edema.  "He is current having his legs wrapped daily. Reports he will be getting new compression stockings.   BP Readings from Last 3 Encounters:   06/18/25 105/60   05/20/25 101/69   05/14/25 101/69        Hyperlipidemia   Simvastatin 20 mg daily   No concerns today.      GERD    Pantoprazole 40 mg twice daily   No concerns today.  Finds effective.      Hypothyroidism   Levothyroxine 125 mcg daily   No concerns today.      RLS   Sinemet  mg at bedtime as needed   Finds very helpful. Reports his legs are \"jumping all over the place\" at night if he doesn't use it.     Pain   Gabapentin 300 mg twice daily   No concerns. Reports he feels fatigued most of the time.     BPH  Tamsulosin 0.4 mg daily  Finds helpful.     Supplements   Ferrous sulfate 325 mg daily   No concerns.     Cough   Guaifenesin 600 mg twice daily   Finds helpful. Not using nasal spray.     ----------------  I spent 14 minutes with this patient today. All changes were made via collaborative practice agreement with Zhanna Bhardwaj CNP.     A summary of these recommendations was sent via [employer].    Rupal Estrada, PharmD  Medication Therapy Management Pharmacist  [facility]    Telemedicine Visit Details  The patient's medications can be safely assessed via a telemedicine encounter.  Type of service:  Telephone visit  Originating Location (pt. Location): Home    Distant Location (provider location):  Off-site  Start Time: 3:00 PM  End Time: 3:14 PM  "

## 2025-07-16 ENCOUNTER — TRANSFERRED RECORDS (OUTPATIENT)
Dept: HEALTH INFORMATION MANAGEMENT | Facility: CLINIC | Age: 85
End: 2025-07-16

## 2025-07-16 ENCOUNTER — ASSISTED LIVING VISIT (OUTPATIENT)
Dept: GERIATRICS | Facility: CLINIC | Age: 85
End: 2025-07-16
Payer: MEDICARE

## 2025-07-16 VITALS
WEIGHT: 252 LBS | HEART RATE: 60 BPM | BODY MASS INDEX: 35.15 KG/M2 | RESPIRATION RATE: 17 BRPM | DIASTOLIC BLOOD PRESSURE: 149 MMHG | OXYGEN SATURATION: 88 % | SYSTOLIC BLOOD PRESSURE: 164 MMHG | TEMPERATURE: 97 F

## 2025-07-16 DIAGNOSIS — N18.32 CHRONIC KIDNEY DISEASE, STAGE 3B (H): ICD-10-CM

## 2025-07-16 DIAGNOSIS — A41.9 BACTERIAL SEPSIS (H): Primary | ICD-10-CM

## 2025-07-16 DIAGNOSIS — J15.9 BACTERIAL PNEUMONIA: ICD-10-CM

## 2025-07-16 DIAGNOSIS — I50.9 CHRONIC CONGESTIVE HEART FAILURE, UNSPECIFIED HEART FAILURE TYPE (H): ICD-10-CM

## 2025-07-16 PROCEDURE — 99349 HOME/RES VST EST MOD MDM 40: CPT | Performed by: NURSE PRACTITIONER

## 2025-07-16 NOTE — PROGRESS NOTES
[facility] GERIATRICS    Chief Complaint   Patient presents with    RECHECK     HPI:  Luz Elena Sampson is a 84 year old  (1940), who is being seen today for an episodic care visit at: [facility] ([facility]) [[id number]]. Today's concern is:   1. Bacterial sepsis (H)    2. Bacterial pneumonia    3. Chronic congestive heart failure, unspecified heart failure type (H)    4. Chronic kidney disease, stage 3b (H)      Patient seen for follow up, sent to ER on 7/6 after feeling weak and confused, Dx sepsis and PNE, started on ABX along with prednisone burst, nebs and reduction in bumex (presumed due to creat), today lungs area clear, afebrile, reports feeling much better, no distress, of note recent BNP 4522 and creat 2.06, edema 2+, soft BP's, attempting to balance cardiac and renal closely as neither are doing well.    Allergies, and PMH/PSH reviewed in EPIC today.  REVIEW OF SYSTEMS:  4 point ROS including Respiratory, CV, GI and , other than that noted in the HPI,  is negative    Objective:   BP (!) 164/149   Pulse 60   Temp 97  F (36.1  C)   Resp 17   Wt 114.3 kg (252 lb)   SpO2 (!) 88%   BMI 35.15 kg/m    GENERAL APPEARANCE:  in no distress, appears healthy  ENT:  Mouth and posterior oropharynx normal, moist mucous membranes  RESP:  lungs clear to auscultation , no respiratory distress  CV:  peripheral edema 2+ in lower legs, rate-normal  ABDOMEN:  bowel sounds normal  M/S:   Gait and station abnormal unsteady  SKIN:  Inspection of skin and subcutaneous tissue baseline  NEURO:   Examination of sensation by touch normal  PSYCH:  affect and mood normal    Labs done in SNF are in [facility]. Please refer to them using [employer]/Care Everywhere.    Assessment/Plan:  (A41.9) Bacterial sepsis (H)  (primary encounter diagnosis)  (J15.9) Bacterial pneumonia  Comment: ER 7/6 with confusion, weakness, now afebrile, lungs clear  Plan:   -repeat CXR  -discontinue neb (has 12 dose left to  take)  -continue augmentin through 7/16  -completed prednisone burst already  -pending CXR results may schedule neb again, repeat ABX and/or give another prednisone burst    (I50.9) Chronic congestive heart failure, unspecified heart failure type (H)  (N18.32) Chronic kidney disease, stage 3b (H)  Comment: BNP 4522, creat 2.06, soft BP's, edema 2+  Plan:   -increase bumex back to 2mg dose  -monitor weights daily  -dose meds renally  -lymph to eval/treat legs  -BMP, BNP on 8/12    MED REC REQUIRED  Post Medication Reconciliation Status: medication reconcilation previously completed during another office visit          Electronically signed by: JITENDRA Freitas CNP

## 2025-07-16 NOTE — LETTER
7/16/2025      Luz Elena Sampson  Attn Parmjit Sampson  [address]  [place] [address]        M [facility] GERIATRICS    Chief Complaint   Patient presents with     RECHECK     HPI:  Luz Elena Sampson is a 84 year old  (1940), who is being seen today for an episodic care visit at: [facility] ([facility]) [[id number]]. Today's concern is:   1. Bacterial sepsis (H)    2. Bacterial pneumonia    3. Chronic congestive heart failure, unspecified heart failure type (H)    4. Chronic kidney disease, stage 3b (H)      Patient seen for follow up, sent to ER on 7/6 after feeling weak and confused, Dx sepsis and PNE, started on ABX along with prednisone burst, nebs and reduction in bumex (presumed due to creat), today lungs area clear, afebrile, reports feeling much better, no distress, of note recent BNP 4522 and creat 2.06, edema 2+, soft BP's, attempting to balance cardiac and renal closely as neither are doing well.    Allergies, and PMH/PSH reviewed in EPIC today.  REVIEW OF SYSTEMS:  4 point ROS including Respiratory, CV, GI and , other than that noted in the HPI,  is negative    Objective:   BP (!) 164/149   Pulse 60   Temp 97  F (36.1  C)   Resp 17   Wt 114.3 kg (252 lb)   SpO2 (!) 88%   BMI 35.15 kg/m    GENERAL APPEARANCE:  in no distress, appears healthy  ENT:  Mouth and posterior oropharynx normal, moist mucous membranes  RESP:  lungs clear to auscultation , no respiratory distress  CV:  peripheral edema 2+ in lower legs, rate-normal  ABDOMEN:  bowel sounds normal  M/S:   Gait and station abnormal unsteady  SKIN:  Inspection of skin and subcutaneous tissue baseline  NEURO:   Examination of sensation by touch normal  PSYCH:  affect and mood normal    Labs done in SNF are in [facility]. Please refer to them using [employer]/Care Everywhere.    Assessment/Plan:  (A41.9) Bacterial sepsis (H)  (primary encounter diagnosis)  (J15.9) Bacterial pneumonia  Comment: ER 7/6 with confusion,  weakness, now afebrile, lungs clear  Plan:   -repeat CXR  -discontinue neb (has 12 dose left to take)  -continue augmentin through 7/16  -completed prednisone burst already  -pending CXR results may schedule neb again, repeat ABX and/or give another prednisone burst    (I50.9) Chronic congestive heart failure, unspecified heart failure type (H)  (N18.32) Chronic kidney disease, stage 3b (H)  Comment: BNP 4522, creat 2.06, soft BP's, edema 2+  Plan:   -increase bumex back to 2mg dose  -monitor weights daily  -dose meds renally  -lymph to eval/treat legs  -BMP, BNP on 8/12    MED REC REQUIRED  Post Medication Reconciliation Status: medication reconcilation previously completed during another office visit          Electronically signed by: JITENDRA Freitas CNP          Sincerely,        JITENDRA Freitas CNP    Electronically signed

## 2025-07-18 ENCOUNTER — LAB REQUISITION (OUTPATIENT)
Dept: LAB | Facility: CLINIC | Age: 85
End: 2025-07-18
Payer: MEDICARE

## 2025-07-18 DIAGNOSIS — I50.20 UNSPECIFIED SYSTOLIC (CONGESTIVE) HEART FAILURE (H): ICD-10-CM

## 2025-07-22 LAB
ANION GAP SERPL CALCULATED.3IONS-SCNC: 11 MMOL/L (ref 7–15)
BUN SERPL-MCNC: 27.6 MG/DL (ref 8–23)
CALCIUM SERPL-MCNC: 8.9 MG/DL (ref 8.8–10.4)
CHLORIDE SERPL-SCNC: 100 MMOL/L (ref 98–107)
CREAT SERPL-MCNC: 1.6 MG/DL (ref 0.67–1.17)
EGFRCR SERPLBLD CKD-EPI 2021: 42 ML/MIN/1.73M2
GLUCOSE SERPL-MCNC: 84 MG/DL (ref 70–99)
HCO3 SERPL-SCNC: 21 MMOL/L (ref 22–29)
POTASSIUM SERPL-SCNC: 4.3 MMOL/L (ref 3.4–5.3)
SODIUM SERPL-SCNC: 132 MMOL/L (ref 135–145)

## 2025-07-23 ENCOUNTER — ASSISTED LIVING VISIT (OUTPATIENT)
Dept: GERIATRICS | Facility: CLINIC | Age: 85
End: 2025-07-23
Payer: MEDICARE

## 2025-07-23 VITALS
WEIGHT: 252 LBS | SYSTOLIC BLOOD PRESSURE: 164 MMHG | DIASTOLIC BLOOD PRESSURE: 149 MMHG | HEART RATE: 60 BPM | OXYGEN SATURATION: 88 % | BODY MASS INDEX: 35.15 KG/M2 | TEMPERATURE: 97 F | RESPIRATION RATE: 17 BRPM

## 2025-07-23 DIAGNOSIS — I50.9 CHRONIC CONGESTIVE HEART FAILURE, UNSPECIFIED HEART FAILURE TYPE (H): ICD-10-CM

## 2025-07-23 DIAGNOSIS — N18.32 CHRONIC KIDNEY DISEASE, STAGE 3B (H): Primary | ICD-10-CM

## 2025-07-23 DIAGNOSIS — R60.0 BILATERAL LEG EDEMA: ICD-10-CM

## 2025-07-23 PROCEDURE — 99349 HOME/RES VST EST MOD MDM 40: CPT | Performed by: NURSE PRACTITIONER

## 2025-07-23 NOTE — PROGRESS NOTES
[facility] GERIATRICS    Chief Complaint   Patient presents with    RECHECK     HPI:  Luz Elena Sampson is a 84 year old  (1940), who is being seen today for an episodic care visit at: [facility] ([facility]) [[id number]]. Today's concern is:   1. Chronic kidney disease, stage 3b (H)    2. Chronic congestive heart failure, unspecified heart failure type (H)    3. Bilateral leg edema      Patient seen for follow up, labs 7/22 with creat 1.6, GFR 42, slightly better than previously even after increase in bumex, recent BNP 4522, denies CP, lungs clear, leg edema improved with lymph wraps to 2+ leg and pedal, skin intact, overall doing well.    Allergies, and PMH/PSH reviewed in EPIC today.  REVIEW OF SYSTEMS:  4 point ROS including Respiratory, CV, GI and , other than that noted in the HPI,  is negative    Objective:   BP (!) 164/149   Pulse 60   Temp 97  F (36.1  C)   Resp 17   Wt 114.3 kg (252 lb)   SpO2 (!) 88%   BMI 35.15 kg/m    GENERAL APPEARANCE:  in no distress, appears healthy  ENT:  Mouth and posterior oropharynx normal, moist mucous membranes  RESP:  lungs clear to auscultation , no respiratory distress  CV:  peripheral edema 2+ in lower legs, rate-normal  ABDOMEN:  bowel sounds normal  M/S:   Gait and station abnormal unsteady  SKIN:  Inspection of skin and subcutaneous tissue baseline  NEURO:   Examination of sensation by touch normal  PSYCH:  affect and mood normal    Labs done in SNF are in [facility]. Please refer to them using [facility]/Care Everywhere.    Assessment/Plan:  (N18.32) Chronic kidney disease, stage 3b (H)  (primary encounter diagnosis)  Comment: creat 1.6, GFR 42 (ordered by cardiology)  Plan:   -dose meds renally  -continue bumex 2mg  -repeat BMP on 8/12    (I50.9) Chronic congestive heart failure, unspecified heart failure type (H)  (R60.0) Bilateral leg edema  Comment: weight down to 246lbs, in April was 260lbs, BNP 4522, edema 2+  Plan:   -continue  lymph wraps for now  -ordered new Juxalite leg wraps, to start when lymph is done  -continue bumex 2mg, spironolactone 12.5mg   -BMP, BNP on 8/12    MED REC REQUIRED  Post Medication Reconciliation Status: medication reconcilation previously completed during another office visit        Electronically signed by: JITENDRA Freitas CNP

## 2025-07-23 NOTE — LETTER
7/23/2025      Luz Elena Sampson  Attn Parmjit Sampson  [address]  [place] [address]        M [facility] GERIATRICS    Chief Complaint   Patient presents with     RECHECK     HPI:  Luz Elena Sampson is a 84 year old  (1940), who is being seen today for an episodic care visit at: [facility] ([facility]) [[id number]]. Today's concern is:   1. Chronic kidney disease, stage 3b (H)    2. Chronic congestive heart failure, unspecified heart failure type (H)    3. Bilateral leg edema      Patient seen for follow up, labs 7/22 with creat 1.6, GFR 42, slightly better than previously even after increase in bumex, recent BNP 4522, denies CP, lungs clear, leg edema improved with lymph wraps to 2+ leg and pedal, skin intact, overall doing well.    Allergies, and PMH/PSH reviewed in EPIC today.  REVIEW OF SYSTEMS:  4 point ROS including Respiratory, CV, GI and , other than that noted in the HPI,  is negative    Objective:   BP (!) 164/149   Pulse 60   Temp 97  F (36.1  C)   Resp 17   Wt 114.3 kg (252 lb)   SpO2 (!) 88%   BMI 35.15 kg/m    GENERAL APPEARANCE:  in no distress, appears healthy  ENT:  Mouth and posterior oropharynx normal, moist mucous membranes  RESP:  lungs clear to auscultation , no respiratory distress  CV:  peripheral edema 2+ in lower legs, rate-normal  ABDOMEN:  bowel sounds normal  M/S:   Gait and station abnormal unsteady  SKIN:  Inspection of skin and subcutaneous tissue baseline  NEURO:   Examination of sensation by touch normal  PSYCH:  affect and mood normal    Labs done in SNF are in [facility]. Please refer to them using [employer]/Care Everywhere.    Assessment/Plan:  (N18.32) Chronic kidney disease, stage 3b (H)  (primary encounter diagnosis)  Comment: creat 1.6, GFR 42 (ordered by cardiology)  Plan:   -dose meds renally  -continue bumex 2mg  -repeat BMP on 8/12    (I50.9) Chronic congestive heart failure, unspecified heart failure type (H)  (R60.0) Bilateral leg  edema  Comment: weight down to 246lbs, in April was 260lbs, BNP 4522, edema 2+  Plan:   -continue lymph wraps for now  -ordered new Juxalite leg wraps, to start when lymph is done  -continue bumex 2mg, spironolactone 12.5mg   -BMP, BNP on 8/12    MED REC REQUIRED  Post Medication Reconciliation Status: medication reconcilation previously completed during another office visit        Electronically signed by: JITENDRA Freitas CNP          Sincerely,        JITENDRA Freitas CNP    Electronically signed

## 2025-07-30 ENCOUNTER — ASSISTED LIVING VISIT (OUTPATIENT)
Dept: GERIATRICS | Facility: CLINIC | Age: 85
End: 2025-07-30
Payer: MEDICARE

## 2025-07-30 VITALS
OXYGEN SATURATION: 88 % | SYSTOLIC BLOOD PRESSURE: 164 MMHG | WEIGHT: 252 LBS | DIASTOLIC BLOOD PRESSURE: 149 MMHG | TEMPERATURE: 97 F | HEART RATE: 60 BPM | RESPIRATION RATE: 17 BRPM | BODY MASS INDEX: 35.15 KG/M2

## 2025-07-30 DIAGNOSIS — I50.9 CHRONIC CONGESTIVE HEART FAILURE, UNSPECIFIED HEART FAILURE TYPE (H): Primary | ICD-10-CM

## 2025-07-30 DIAGNOSIS — R60.0 BILATERAL LEG EDEMA: ICD-10-CM

## 2025-07-30 DIAGNOSIS — N18.32 CHRONIC KIDNEY DISEASE, STAGE 3B (H): ICD-10-CM

## 2025-07-30 PROCEDURE — 99349 HOME/RES VST EST MOD MDM 40: CPT | Performed by: NURSE PRACTITIONER

## 2025-07-30 NOTE — LETTER
7/30/2025      Luz Elena Sampson  Attn Parmjit Sampson  [address]  [place] [address]        M [facility] GERIATRICS    Chief Complaint   Patient presents with     RECHECK     HPI:  Luz Elena Sampson is a 84 year old  (1940), who is being seen today for an episodic care visit at: [facility] ([facility]) [[id number]]. Today's concern is:   1. Chronic congestive heart failure, unspecified heart failure type (H)    2. Bilateral leg edema    3. Chronic kidney disease, stage 3b (H)      Patient seen for follow up, BNP 4522, leg edema improved to 2+ medial leg to pedal, compression not on yet this morning, denies CP, VS WNL, attempts to apply own compression but thought is would benefit if staff would do with patient supervision, creat 1.6, GFR 42, overall doing well, weight up to 248lbs from 241 albeit recent high was 264lbs.    Allergies, and PMH/PSH reviewed in EPIC today.  REVIEW OF SYSTEMS:  4 point ROS including Respiratory, CV, GI and , other than that noted in the HPI,  is negative    Objective:   BP (!) 164/149   Pulse 60   Temp 97  F (36.1  C)   Resp 17   Wt 114.3 kg (252 lb)   SpO2 (!) 88%   BMI 35.15 kg/m    GENERAL APPEARANCE:  in no distress, appears healthy  ENT:  Mouth and posterior oropharynx normal, moist mucous membranes  RESP:  lungs clear to auscultation , no respiratory distress  CV:  peripheral edema 2+ in lower legs, ankles, pedal  ABDOMEN:  bowel sounds normal  M/S:   Gait and station abnormal unsteady  SKIN:  Inspection of skin and subcutaneous tissue baseline  NEURO:   Examination of sensation by touch normal  PSYCH:  affect and mood normal    Labs done in SNF are in [facility]. Please refer to them using [employer]/Care Everywhere.    Assessment/Plan:  (I50.9) Chronic congestive heart failure, unspecified heart failure type (H)  (primary encounter diagnosis)  (R60.0) Bilateral leg edema  (N18.32) Chronic kidney disease, stage 3b (H)  Comment: balancing CHF with  edema, BP's and renal, recent labs generally WNL, weight 248lbs, edema 2+, creat 1.6  Plan:   -change from lymph wraps to Juxtalites, has new fitted compression but may need assist for application  -elevate legs as possible  -continue bumex 2mg, spironolactone 12.5mg  -daily weights  -repeat BMP, BNP on 8/12  -dose meds renally as possible  -plan to increase spironolactone to 25mg if weight is ever >250lbs    MED REC REQUIRED  Post Medication Reconciliation Status: medication reconcilation previously completed during another office visit        Electronically signed by: JITENDRA Freitas CNP          Sincerely,        JITENDRA Freitas CNP    Electronically signed

## 2025-07-30 NOTE — PROGRESS NOTES
[facility] GERIATRICS    Chief Complaint   Patient presents with    RECHECK     HPI:  Luz Elena Sampson is a 84 year old  (1940), who is being seen today for an episodic care visit at: [facility] ([facility]) [[id number]]. Today's concern is:   1. Chronic congestive heart failure, unspecified heart failure type (H)    2. Bilateral leg edema    3. Chronic kidney disease, stage 3b (H)      Patient seen for follow up, BNP 4522, leg edema improved to 2+ medial leg to pedal, compression not on yet this morning, denies CP, VS WNL, attempts to apply own compression but thought is would benefit if staff would do with patient supervision, creat 1.6, GFR 42, overall doing well, weight up to 248lbs from 241 albeit recent high was 264lbs.    Allergies, and PMH/PSH reviewed in EPIC today.  REVIEW OF SYSTEMS:  4 point ROS including Respiratory, CV, GI and , other than that noted in the HPI,  is negative    Objective:   BP (!) 164/149   Pulse 60   Temp 97  F (36.1  C)   Resp 17   Wt 114.3 kg (252 lb)   SpO2 (!) 88%   BMI 35.15 kg/m    GENERAL APPEARANCE:  in no distress, appears healthy  ENT:  Mouth and posterior oropharynx normal, moist mucous membranes  RESP:  lungs clear to auscultation , no respiratory distress  CV:  peripheral edema 2+ in lower legs, ankles, pedal  ABDOMEN:  bowel sounds normal  M/S:   Gait and station abnormal unsteady  SKIN:  Inspection of skin and subcutaneous tissue baseline  NEURO:   Examination of sensation by touch normal  PSYCH:  affect and mood normal    Labs done in SNF are in [facility]. Please refer to them using [employer]/Care Everywhere.    Assessment/Plan:  (I50.9) Chronic congestive heart failure, unspecified heart failure type (H)  (primary encounter diagnosis)  (R60.0) Bilateral leg edema  (N18.32) Chronic kidney disease, stage 3b (H)  Comment: balancing CHF with edema, BP's and renal, recent labs generally WNL, weight 248lbs, edema 2+, creat 1.6  Plan:    -change from lymph wraps to Juxtalites, has new fitted compression but may need assist for application  -elevate legs as possible  -continue bumex 2mg, spironolactone 12.5mg  -daily weights  -repeat BMP, BNP on 8/12  -dose meds renally as possible  -plan to increase spironolactone to 25mg if weight is ever >250lbs    MED REC REQUIRED  Post Medication Reconciliation Status: medication reconcilation previously completed during another office visit        Electronically signed by: JITENDRA Freitas CNP

## 2025-07-31 ENCOUNTER — OFFICE VISIT (OUTPATIENT)
Dept: CARDIOLOGY | Facility: CLINIC | Age: 85
End: 2025-07-31
Payer: MEDICARE

## 2025-07-31 VITALS
DIASTOLIC BLOOD PRESSURE: 58 MMHG | HEART RATE: 74 BPM | BODY MASS INDEX: 34.73 KG/M2 | WEIGHT: 249 LBS | OXYGEN SATURATION: 99 % | SYSTOLIC BLOOD PRESSURE: 100 MMHG

## 2025-07-31 DIAGNOSIS — I35.0 SEVERE AORTIC VALVE STENOSIS: ICD-10-CM

## 2025-07-31 DIAGNOSIS — I10 HYPERTENSION, UNSPECIFIED TYPE: ICD-10-CM

## 2025-07-31 DIAGNOSIS — I48.21 PERMANENT ATRIAL FIBRILLATION (H): ICD-10-CM

## 2025-07-31 DIAGNOSIS — N18.30 STAGE 3 CHRONIC KIDNEY DISEASE, UNSPECIFIED WHETHER STAGE 3A OR 3B CKD (H): ICD-10-CM

## 2025-07-31 DIAGNOSIS — I50.9 CHRONIC CONGESTIVE HEART FAILURE, UNSPECIFIED HEART FAILURE TYPE (H): Primary | ICD-10-CM

## 2025-07-31 RX ORDER — POTASSIUM CHLORIDE 1500 MG/1
20 TABLET, EXTENDED RELEASE ORAL PRN
Qty: 2 TABLET | Refills: 0 | Status: SHIPPED | OUTPATIENT
Start: 2025-07-31

## 2025-07-31 RX ORDER — METOLAZONE 2.5 MG/1
2.5 TABLET ORAL PRN
Qty: 2 TABLET | Refills: 0 | Status: SHIPPED | OUTPATIENT
Start: 2025-07-31

## 2025-07-31 NOTE — PROGRESS NOTES
[facility]    CORE  initial with his son present      Luz Elena Sampson is a pleasant 84 year old male, who presents for initial CORE appt  Prior history significant for permanent atrial fibrillation with slow ventricular response, ischemic and valvular heart disease, peripheral arterial disease, hypertension, dyslipoproteinemia, stage III chronic kidney disease, obstructive sleep apnea, chronic venous insufficiency, hypothyroidism, and parkinsonism.     Cardiac History  - PCI at OSH 9/27/2021, with stent to proximal and mid LAD and balloon angioplasty of the diagonal   - aortic stenosis s/p bioprosthetic TAVR 10/2021    Last seen in clinic by Dr. Llamas on 12/10/24. No chest pain, shortness of breath, or dyspnea on activities of daily living. Did have chronic BLE edema with support wraps. He was started on lisinopril 5 mg daily for HTN.     Referred to CORE :  Today he states he has SOB at rest.  No HERNANDEZ.  He has swelling in the legs - upto his knees.  He has wraps - he puts them on himself.  He says at night after removing the wraps he doesn't see much swelling. Weight on his scale 249 lbs.  He ways its up from 242 lbs 10-12 days ago.  His appetite has been fine.  He doesn't feel any fluid in the belly. He doesn't check his BP at home. The facility checks them once a month or so. BP's are stable he says. He doesn't see a nephrologist.         PAST MEDICAL HISTORY:  Past Medical History:   Diagnosis Date    Atrial fibrillation (H)     Chronic kidney disease     GERD (gastroesophageal reflux disease)     Hyperlipidemia     Hypertension     Hypothyroid     Psoriasis     Syncope 1/29/2023       FAMILY HISTORY:  Family History   Problem Relation Age of Onset    Cataracts Mother     Dementia Mother     Diabetes Mother     Alzheimer Disease Mother     Hypertension Father     Heart Disease Father     Kidney Disease Father     Diabetes Maternal Grandmother     Diabetes Maternal Grandfather     Spina bifida Brother     Low  Back Problems Daughter        SOCIAL HISTORY:  Social History     Socioeconomic History    Marital status:    Tobacco Use    Smoking status: Former     Types: Cigarettes, Pipe, Cigars    Smokeless tobacco: Never   Substance and Sexual Activity    Alcohol use: Yes     Comment: 2 beers per evening    Drug use: No     Social Drivers of Health     Physical Activity: Inactive (3/3/2021)    [facility]    Exercise Vital Sign     Days of Exercise per Week: 0 days     Minutes of Exercise per Session: 0 min    [facility] [employer] [facility]    Social Connections       MEDICATIONS:  Current Outpatient Medications   Medication Sig Dispense Refill    ACE/ARB/ARNI NOT PRESCRIBED (INTENTIONAL) Please choose reason not prescribed from choices below.      bumetanide (BUMEX) 2 MG tablet Take 1 tablet (2 mg) by mouth daily. 90 tablet 3    carbidopa-levodopa (SINEMET)  MG tablet Take 1 tablet by mouth nightly as needed (RLS). 1 tablet 0    empagliflozin (JARDIANCE) 10 MG TABS tablet Take 1 tablet (10 mg) by mouth daily. 90 tablet 1    ferrous sulfate (FEROSUL) 325 (65 Fe) MG tablet Take 325 mg by mouth daily.      gabapentin (NEURONTIN) 300 MG capsule Take 1 capsule (300 mg) by mouth 2 times daily.      guaiFENesin (MUCINEX) 600 MG 12 hr tablet TAKE ONE TABLET BY MOUTH TWICE DAILY FOR COUGH *VIALS* 60 tablet 11    levothyroxine (SYNTHROID/LEVOTHROID) 125 MCG tablet Take 1 tablet (125 mcg) by mouth daily      ORDER FOR DME Wheelchair; Right elevating leg rest  [employer]  Phone: [phone]  Diagnosis: Gait Instability related to Right Ankle Fusion 1 Device 0    pantoprazole (PROTONIX) 40 MG EC tablet Take 1 tablet (40 mg) by mouth 2 times daily.      rivaroxaban ANTICOAGULANT (XARELTO) 15 MG TABS tablet Take 1 tablet (15 mg) by mouth daily (with dinner). 90 tablet 3    simvastatin (ZOCOR) 20  MG tablet TAKE ONE TABLET BY MOUTH ONCE DAILY *VIALS* 30 tablet 11    spironolactone (ALDACTONE) 25 MG tablet Take 0.5 tablets (12.5 mg) by mouth daily. 45 tablet 3    tamsulosin (FLOMAX) 0.4 MG capsule Take 1 capsule (0.4 mg) by mouth every evening 90 capsule 3     No current facility-administered medications for this visit.        ALLERGIES:     Allergies   Allergen Reactions    Warfarin Itching     Alopecia    Nsaids Nephrotoxicity     Patient avoids due to kidney function       ROS:  A complete 10-point ROS was negative except as above.    PHYSICAL EXAM:  /58 (BP Location: Right arm, Patient Position: Chair, Cuff Size: Adult Regular)   Pulse 74   Wt 112.9 kg (249 lb)   SpO2 99%   BMI 34.73 kg/m    Gen: alert, interactive, NAD  Neck: supple, no JVD  CV: irregularly irregular, no m/r/g  Chest: CTAB, no wheezes or crackles  Abd: soft, NT, ND  Ext: +3  pitting BLE edema    LABS:    CMP  Sodium   Date Value Ref Range Status   07/22/2025 132 (L) 135 - 145 mmol/L Final   11/19/2012 140 133 - 144 mmol/L Final     Potassium   Date Value Ref Range Status   07/22/2025 4.3 3.4 - 5.3 mmol/L Final   11/19/2012 4.8 3.4 - 5.3 mmol/L Final     Chloride   Date Value Ref Range Status   07/22/2025 100 98 - 107 mmol/L Final   11/19/2012 108 94 - 109 mmol/L Final     Carbon Dioxide   Date Value Ref Range Status   11/19/2012 24 20 - 32 mmol/L Final     Carbon Dioxide (CO2)   Date Value Ref Range Status   07/22/2025 21 (L) 22 - 29 mmol/L Final     Anion Gap   Date Value Ref Range Status   07/22/2025 11 7 - 15 mmol/L Final   11/19/2012 8 6 - 17 mmol/L Final     Glucose   Date Value Ref Range Status   07/22/2025 84 70 - 99 mg/dL Final   11/19/2012 90 60 - 99 mg/dL Final     Urea Nitrogen   Date Value Ref Range Status   07/22/2025 27.6 (H) 8.0 - 23.0 mg/dL Final   11/19/2012 25 7 - 30 mg/dL Final     Creatinine   Date Value Ref Range Status   07/22/2025 1.60 (H) 0.67 - 1.17 mg/dL Final   11/19/2012 1.33 (H) 0.66 - 1.25 mg/dL  "Final     GFR Estimate   Date Value Ref Range Status   07/22/2025 42 (L) >60 mL/min/1.73m2 Final   11/19/2012 53 (L) >60 mL/min/1.7m2 Final     Calcium   Date Value Ref Range Status   07/22/2025 8.9 8.8 - 10.4 mg/dL Final   11/19/2012 8.8 8.5 - 10.4 mg/dL Final       CBC  CBC RESULTS:   Recent Labs   Lab Test 03/11/25  1008   WBC 6.2   RBC 3.90*   HGB 10.7*   HCT 34.4*   MCV 88   MCH 27.4   MCHC 31.1*   RDW 14.7          TROP  No results found for: \"TROPI\", \"TROPONIN\", \"TROPR\", \"TROPN\"    LIPIDS  No results for input(s): \"CHOL\", \"HDL\", \"LDL\", \"TRIG\", \"CHOLHDLRATIO\" in the last 84435 hours.    TSH  TSH   Date Value Ref Range Status   02/10/2025 7.83 (H) 0.30 - 4.20 uIU/mL Final       HBA1C  Lab Results   Component Value Date    A1C 5.6 06/27/2023         CARDIAC DATA:   Zio 11/10/22  100% AF burden, self-controlled rate    14 day Zio monitor (2/6/2025):   Predominant rhythm is persistent AF with a controlled ventricular response ranging from  bpm (avg of 59 bpm). Bradycardia was during wakeful hours. No prolonged ventricular pauses were noted though.   Baseline IVCD was present.   Patient-triggered events correlated with AF.   Non-sustained VT was noted.  The longest was 11 seconds in duration.     EKG 12/10/24      Echo 4/8/25  s/p 29 mm Lu Jonny TAVR valve on 10/19/2021. Doppler interrogation of  the prosthetic valve is normal. No paravalvular or valvular regurgitation.  Left ventricular size is normal. Global and regional left ventricular function  is normal with an EF of 60-65%.  The right ventricle is normal size. Global right ventricular function is  normal.  The right ventricular systolic pressure is 41mmHg above the right atrial  pressure.  IVC diameter measures 2.6 cm and collapsing <50% with sniff suggests a high RA  pressure estimated at 15 mmHg or greater.  Moderate (pulmonary artery systolic pressure 50-75mmHg) pulmonary hypertension  is present.  No pericardial effusion is " present.  This study was compared with the study from 05/09/2023, right-sided filling  pressures are higher.    Coronary angiogram 9/27/2021  1. Successful Shockwave lithoplasty of the proximal and mid LAD (separate   catheters/guideliner delivered).   2. Successful PCI of the proximal-mid LAD following deployment of Orsiro   3.5 x 18 mm and 3.0 x 26 mm Nora in overlapping fashion, post dilated with   3.0 mm and 3.5 mm NC balloons respectively (IFR guided).   3. Successful PTCA of the diagonal.   4. Severe aortic stenosis confirmed preprocedure.     ASSESSMENT/PLAN:  In summary, Luz Elena Sampson is here today with the following -      # Permanent atrial fibrillation  # CAD s/p PCI to proximal and mid LAD 9/27/2021  # Aortic stenosis s/p TAVR 10/2021  # HFpEF  # Pulmonary hypertension    Primary cardiologist: Dr. Roge Romero    Blood pressures not controlled. Most recent lipid panel on 9/15/22 with LDL 85, TG 61. Echo ( most recent) with well-seated TAVR valve and no regurgitation. EF normal 60-65%, however RVSP is elevated, IVC dilated, and there is moderate pulmonary hypertension.     Patient additionally expressed that he is not interested in pursuing Watchman procedure at this time, and understands risks/benefits of resuming Xarelto.FYI  Neurology note from 3/7/25 outpatient visit states that CT head showed resolution in head bleed with no need for follow up imaging.    - CAD/HLD: continue simvastatin  - Afib:  Xarelto 15 mg daily  GDMT: HFPEF      - fluid status: HYPERVOLEMIC  Bumex to 2 mg daily     - Entresto discontinued due to reported side effects of nausea, diarrhea and lethargy.      - SGLT2: Jardiance     - MRA: spironolactone 12.5 mg daily  - Update lipid panel, BMP in 2 weeks  - Discussed importance of daily weights, 2g sodium diet, and medication adherence  - Patient aware to call with wt gain or >2 lbs in 24 hours or >5 lbs in one week   - Continue LE wraps for edema      Follow up:  - Metolazone 2.5  mg tomorrow 30 min before Bumex- again another dose Sunday   - RN to call on Tuesday to see how the patient is doing.   - Dr. Romero next available.   - CORE 4-6 weeks       Kamilla HAM NP-C, CHFN  Advanced Heart Failure Nurse Practitioner  [facility]     The longitudinal plan of care for the diagnosis(es)/condition(s) as documented were addressed during this visit. Due to the added complexity in care, I will continue to support Luz Elena in the subsequent management and with ongoing continuity of care.

## 2025-07-31 NOTE — NURSING NOTE
"Chief Complaint   Patient presents with    CORE       Initial /58 (BP Location: Right arm, Patient Position: Chair, Cuff Size: Adult Regular)   Pulse 74   Wt 112.9 kg (249 lb)   SpO2 99%   BMI 34.73 kg/m   Estimated body mass index is 34.73 kg/m  as calculated from the following:    Height as of 5/14/25: 1.803 m (5' 11\").    Weight as of this encounter: 112.9 kg (249 lb)..  BP completed using cuff size: regular    Corina Milan, Visit Facilitator    "

## 2025-07-31 NOTE — PATIENT INSTRUCTIONS
Take your medicines every day, as directed    Changes made today:  Take metolazone 2.5 mg Friday with 20 mEq KCl (30 minutes after you take Bumex)  Take metolazone 2.5 mg again on Sunday with 20 mEq KCl (30 minutes after you take Bumex)  RN call on Tuesday to see what your weight is and see how you are feeling   Monitor Your Weight and Symptoms    Contact us if you:    Gain 2 pounds in one day or 5 pounds in one week  Feel more short of breath  Notice more leg swelling  Feel lightheadeded   Change your lifestyle    Limit Salt or Sodium:  2000 mg  Limit Fluids:  2000 mL or approximately 64 ounces  Eat a Heart Healthy Diet  Low in saturated fats  Stay Active:  Aim to move at least 150 minutes every  week         To Contact us    During Business Hours:  [phone], option # 1      After hours, weekends or holidays:   [phone], Option #4  Ask to speak to the On-Call Cardiologist. Inform them you are a CORE/heart failure patient at the [facility].     Use [employer] allows you to communicate directly with your heart team through secure messaging.  [employer] can be accessed any time on your phone, computer, or tablet.  If you need assistance, we'd be happy to help!         Keep your Heart Appointments:    Cardiologist - next available    CORE in 4-6 weeks     Please consider attending our virtual support group which is held monthly. Please reach out to Luis at [phone] for more information if you are interested in attending.    
details…

## 2025-07-31 NOTE — PROGRESS NOTES
I called Luz Elena's assisted living to check in on weight and BP.    7/9 - 136/86     6/10 - 252 lbs    Fax: [phone]    Labs: Patient was given results of the laboratory testing obtained today. Patient demonstrated understanding of this information and agreed to call with further questions or concerns.     Med Reconcile: Reviewed and verified all current medications with the patient. The updated medication list was printed and given to the patient.    Return Appointment: Patient given instructions regarding scheduling next clinic visit. Patient demonstrated understanding of this information and agreed to call with further questions or concerns. CORE in 4-6 weeks.    Medication Change: Patient was educated regarding prescribed medication change, including discussion of the indication, administration, side effects, and when to report to MD or RN. Patient demonstrated understanding of this information and agreed to call with further questions or concerns. Metolazone 2.5 mg tomorrow 30 min Bumex- again another dose Sunday. Take 20 mEq Kcl with each dose of metolazone.    Patient stated he understood all health information given and agreed to call with further questions or concerns.    Radha June, RN

## 2025-07-31 NOTE — LETTER
7/31/2025      RE: Luz Elena Sampson  Attn Parmjit Sampson  [address]  [place] [address]       Dear Colleague,    Thank you for the opportunity to participate in the care of your patient, Luz Elena Sampson, at the [facility]. Please see a copy of my visit note below.          7/9 - 136/86     6/10 - 252 lbs    Signed order,     Short term they can do weekly,     Fax: [phone]    Give him signed orders        [facility]  initial with his son present      Luz Elena Sampson is a pleasant 84 year old male, who presents for initial CORE appt  Prior history significant for permanent atrial fibrillation with slow ventricular response, ischemic and valvular heart disease, peripheral arterial disease, hypertension, dyslipoproteinemia, stage III chronic kidney disease, obstructive sleep apnea, chronic venous insufficiency, hypothyroidism, and parkinsonism.     Cardiac History  - PCI at [facility]SH 9/27/2021, with stent to proximal and mid LAD and balloon angioplasty of the diagonal   - aortic stenosis s/p bioprosthetic TAVR 10/2021    Last seen in clinic by Dr. Llamas on 12/10/24. No chest pain, shortness of breath, or dyspnea on activities of daily living. Did have chronic BLE edema with support wraps. He was started on lisinopril 5 mg daily for HTN.     Referred to CORE :  Today he states he has SOB at rest.  No HERNANDEZ.  He has swelling in the legs - upto his knees.  He has wraps - he puts them on himself.  He says at night after removing the wraps he doesn't see much swelling. Weight on his scale 249 lbs.  He ways its up from 242 lbs 10-12 days ago.  His appetite has been fine.  He doesn't feel any fluid in the belly. He doesn't check his BP at home. The facility checks them once a month or so. BP's are stable he says. He doesn't see a nephrologist.         PAST MEDICAL HISTORY:  Past Medical History:   Diagnosis Date     Atrial fibrillation  (H)      Chronic kidney disease      GERD (gastroesophageal reflux disease)      Hyperlipidemia      Hypertension      Hypothyroid      Psoriasis      Syncope 1/29/2023       FAMILY HISTORY:  Family History   Problem Relation Age of Onset     Cataracts Mother      Dementia Mother      Diabetes Mother      Alzheimer Disease Mother      Hypertension Father      Heart Disease Father      Kidney Disease Father      Diabetes Maternal Grandmother      Diabetes Maternal Grandfather      Spina bifida Brother      Low Back Problems Daughter        SOCIAL HISTORY:  Social History     Socioeconomic History     Marital status:    Tobacco Use     Smoking status: Former     Types: Cigarettes, Pipe, Cigars     Smokeless tobacco: Never   Substance and Sexual Activity     Alcohol use: Yes     Comment: 2 beers per evening     Drug use: No     Social Drivers of Health     Physical Activity: Inactive (3/3/2021)    [facility]    Exercise Vital Sign      Days of Exercise per Week: 0 days      Minutes of Exercise per Session: 0 min    [facility]    Social Connections       MEDICATIONS:  Current Outpatient Medications   Medication Sig Dispense Refill     ACE/ARB/ARNI NOT PRESCRIBED (INTENTIONAL) Please choose reason not prescribed from choices below.       bumetanide (BUMEX) 2 MG tablet Take 1 tablet (2 mg) by mouth daily. 90 tablet 3     carbidopa-levodopa (SINEMET)  MG tablet Take 1 tablet by mouth nightly as needed (RLS). 1 tablet 0     empagliflozin (JARDIANCE) 10 MG TABS tablet Take 1 tablet (10 mg) by mouth daily. 90 tablet 1     ferrous sulfate (FEROSUL) 325 (65 Fe) MG tablet Take 325 mg by mouth daily.       gabapentin (NEURONTIN) 300 MG capsule Take 1 capsule (300 mg) by mouth 2 times daily.       guaiFENesin (MUCINEX) 600 MG 12 hr tablet TAKE ONE TABLET BY MOUTH  TWICE DAILY FOR COUGH *VIALS* 60 tablet 11     levothyroxine (SYNTHROID/LEVOTHROID) 125 MCG tablet Take 1 tablet (125 mcg) by mouth daily       ORDER FOR DME Wheelchair; Right elevating leg rest  [employer]  Phone: [phone]  Diagnosis: Gait Instability related to Right Ankle Fusion 1 Device 0     pantoprazole (PROTONIX) 40 MG EC tablet Take 1 tablet (40 mg) by mouth 2 times daily.       rivaroxaban ANTICOAGULANT (XARELTO) 15 MG TABS tablet Take 1 tablet (15 mg) by mouth daily (with dinner). 90 tablet 3     simvastatin (ZOCOR) 20 MG tablet TAKE ONE TABLET BY MOUTH ONCE DAILY *VIALS* 30 tablet 11     spironolactone (ALDACTONE) 25 MG tablet Take 0.5 tablets (12.5 mg) by mouth daily. 45 tablet 3     tamsulosin (FLOMAX) 0.4 MG capsule Take 1 capsule (0.4 mg) by mouth every evening 90 capsule 3     No current facility-administered medications for this visit.        ALLERGIES:     Allergies   Allergen Reactions     Warfarin Itching     Alopecia     Nsaids Nephrotoxicity     Patient avoids due to kidney function       ROS:  A complete 10-point ROS was negative except as above.    PHYSICAL EXAM:  /58 (BP Location: Right arm, Patient Position: Chair, Cuff Size: Adult Regular)   Pulse 74   Wt 112.9 kg (249 lb)   SpO2 99%   BMI 34.73 kg/m    Gen: alert, interactive, NAD  Neck: supple, no JVD  CV: irregularly irregular, no m/r/g  Chest: CTAB, no wheezes or crackles  Abd: soft, NT, ND  Ext: +3  pitting BLE edema    LABS:    CMP  Sodium   Date Value Ref Range Status   07/22/2025 132 (L) 135 - 145 mmol/L Final   11/19/2012 140 133 - 144 mmol/L Final     Potassium   Date Value Ref Range Status   07/22/2025 4.3 3.4 - 5.3 mmol/L Final   11/19/2012 4.8 3.4 - 5.3 mmol/L Final     Chloride   Date Value Ref Range Status   07/22/2025 100 98 - 107 mmol/L Final   11/19/2012 108 94 - 109 mmol/L Final     Carbon Dioxide   Date Value Ref Range Status   11/19/2012 24 20 - 32 mmol/L Final     Carbon Dioxide (CO2)   Date Value Ref Range  "Status   07/22/2025 21 (L) 22 - 29 mmol/L Final     Anion Gap   Date Value Ref Range Status   07/22/2025 11 7 - 15 mmol/L Final   11/19/2012 8 6 - 17 mmol/L Final     Glucose   Date Value Ref Range Status   07/22/2025 84 70 - 99 mg/dL Final   11/19/2012 90 60 - 99 mg/dL Final     Urea Nitrogen   Date Value Ref Range Status   07/22/2025 27.6 (H) 8.0 - 23.0 mg/dL Final   11/19/2012 25 7 - 30 mg/dL Final     Creatinine   Date Value Ref Range Status   07/22/2025 1.60 (H) 0.67 - 1.17 mg/dL Final   11/19/2012 1.33 (H) 0.66 - 1.25 mg/dL Final     GFR Estimate   Date Value Ref Range Status   07/22/2025 42 (L) >60 mL/min/1.73m2 Final   11/19/2012 53 (L) >60 mL/min/1.7m2 Final     Calcium   Date Value Ref Range Status   07/22/2025 8.9 8.8 - 10.4 mg/dL Final   11/19/2012 8.8 8.5 - 10.4 mg/dL Final       CBC  CBC RESULTS:   Recent Labs   Lab Test 03/11/25  1008   WBC 6.2   RBC 3.90*   HGB 10.7*   HCT 34.4*   MCV 88   MCH 27.4   MCHC 31.1*   RDW 14.7          TROP  No results found for: \"TROPI\", \"TROPONIN\", \"TROPR\", \"TROPN\"    LIPIDS  No results for input(s): \"CHOL\", \"HDL\", \"LDL\", \"TRIG\", \"CHOLHDLRATIO\" in the last 37073 hours.    TSH  TSH   Date Value Ref Range Status   02/10/2025 7.83 (H) 0.30 - 4.20 uIU/mL Final       HBA1C  Lab Results   Component Value Date    A1C 5.6 06/27/2023         CARDIAC DATA:   Zio 11/10/22  100% AF burden, self-controlled rate    14 day Zio monitor (2/6/2025):   Predominant rhythm is persistent AF with a controlled ventricular response ranging from  bpm (avg of 59 bpm). Bradycardia was during wakeful hours. No prolonged ventricular pauses were noted though.   Baseline IVCD was present.   Patient-triggered events correlated with AF.   Non-sustained VT was noted.  The longest was 11 seconds in duration.     EKG 12/10/24      Echo 4/8/25  s/p 29 mm Lu Jonny TAVR valve on 10/19/2021. Doppler interrogation of  the prosthetic valve is normal. No paravalvular or valvular " regurgitation.  Left ventricular size is normal. Global and regional left ventricular function  is normal with an EF of 60-65%.  The right ventricle is normal size. Global right ventricular function is  normal.  The right ventricular systolic pressure is 41mmHg above the right atrial  pressure.  IVC diameter measures 2.6 cm and collapsing <50% with sniff suggests a high RA  pressure estimated at 15 mmHg or greater.  Moderate (pulmonary artery systolic pressure 50-75mmHg) pulmonary hypertension  is present.  No pericardial effusion is present.  This study was compared with the study from 05/09/2023, right-sided filling  pressures are higher.    Coronary angiogram 9/27/2021  1. Successful Shockwave lithoplasty of the proximal and mid LAD (separate   catheters/guideliner delivered).   2. Successful PCI of the proximal-mid LAD following deployment of Orsiro   3.5 x 18 mm and 3.0 x 26 mm Nora in overlapping fashion, post dilated with   3.0 mm and 3.5 mm NC balloons respectively (IFR guided).   3. Successful PTCA of the diagonal.   4. Severe aortic stenosis confirmed preprocedure.     ASSESSMENT/PLAN:  In summary, Luz Elena Sampson is here today with the following -      # Permanent atrial fibrillation  # CAD s/p PCI to proximal and mid LAD 9/27/2021  # Aortic stenosis s/p TAVR 10/2021  # HFpEF  # Pulmonary hypertension    Primary cardiologist: Dr. Roge Romero    Blood pressures not controlled. Most recent lipid panel on 9/15/22 with LDL 85, TG 61. Echo ( most recent) with well-seated TAVR valve and no regurgitation. EF normal 60-65%, however RVSP is elevated, IVC dilated, and there is moderate pulmonary hypertension.     Patient additionally expressed that he is not interested in pursuing Watchman procedure at this time, and understands risks/benefits of resuming Xarelto.FYI  Neurology note from 3/7/25 outpatient visit states that CT head showed resolution in head bleed with no need for follow up imaging.    - CAD/HLD:  continue simvastatin  - Afib:  Xarelto 15 mg daily  GDMT: HFPEF      - fluid status: HYPERVOLEMIC  Bumex to 2 mg daily     - ACE/ARB/ARNI: consider starting losartan if Entresto cost prohibitive, dependent on renal function     - SGLT2: Jardiance     - MRA: spironolactone 12.5 mg daily  - Update lipid panel, BMP in 2 weeks  - Discussed importance of daily weights, 2g sodium diet, and medication adherence  - Patient aware to call with wt gain or >2 lbs in 24 hours or >5 lbs in one week   - Continue LE wraps for edema      Follow up:  - Metolazone 2.5 mg tomorrow 30 min Bumex- again another dose Sunday   - RN to call on Tuesday to see how the patient is doing.   - Dr. Romero next available.   - CORE 4-6 weeks       Kamilla HAM NP-C, CHFN  Advanced Heart Failure Nurse Practitioner  [facility] [place]     The longitudinal plan of care for the diagnosis(es)/condition(s) as documented were addressed during this visit. Due to the added complexity in care, I will continue to support Luz Elena in the subsequent management and with ongoing continuity of care.     Please do not hesitate to contact me if you have any questions/concerns.     Sincerely,     JITENDRA Milner CNP

## 2025-08-05 ENCOUNTER — CARE COORDINATION (OUTPATIENT)
Dept: CARDIOLOGY | Facility: CLINIC | Age: 85
End: 2025-08-05
Payer: MEDICARE

## 2025-08-05 DIAGNOSIS — I50.9 CHRONIC CONGESTIVE HEART FAILURE, UNSPECIFIED HEART FAILURE TYPE (H): ICD-10-CM

## 2025-08-06 ENCOUNTER — LAB REQUISITION (OUTPATIENT)
Dept: LAB | Facility: CLINIC | Age: 85
End: 2025-08-06
Payer: MEDICARE

## 2025-08-06 ENCOUNTER — ASSISTED LIVING VISIT (OUTPATIENT)
Dept: GERIATRICS | Facility: CLINIC | Age: 85
End: 2025-08-06
Payer: MEDICARE

## 2025-08-06 VITALS
BODY MASS INDEX: 35.15 KG/M2 | SYSTOLIC BLOOD PRESSURE: 164 MMHG | TEMPERATURE: 97 F | OXYGEN SATURATION: 88 % | HEART RATE: 60 BPM | WEIGHT: 252 LBS | DIASTOLIC BLOOD PRESSURE: 149 MMHG | RESPIRATION RATE: 17 BRPM

## 2025-08-06 DIAGNOSIS — N18.32 CHRONIC KIDNEY DISEASE, STAGE 3B (H): ICD-10-CM

## 2025-08-06 DIAGNOSIS — R60.0 BILATERAL LEG EDEMA: ICD-10-CM

## 2025-08-06 DIAGNOSIS — N18.9 CHRONIC KIDNEY DISEASE, UNSPECIFIED: ICD-10-CM

## 2025-08-06 DIAGNOSIS — I50.9 CHRONIC CONGESTIVE HEART FAILURE, UNSPECIFIED HEART FAILURE TYPE (H): Primary | ICD-10-CM

## 2025-08-06 DIAGNOSIS — I50.9 HEART FAILURE, UNSPECIFIED (H): ICD-10-CM

## 2025-08-06 PROCEDURE — 99349 HOME/RES VST EST MOD MDM 40: CPT | Mod: 25 | Performed by: NURSE PRACTITIONER

## 2025-08-06 PROCEDURE — G0439 PPPS, SUBSEQ VISIT: HCPCS | Performed by: NURSE PRACTITIONER

## 2025-08-07 ENCOUNTER — LAB REQUISITION (OUTPATIENT)
Dept: LAB | Facility: CLINIC | Age: 85
End: 2025-08-07
Payer: MEDICARE

## 2025-08-07 DIAGNOSIS — N18.9 CHRONIC KIDNEY DISEASE, UNSPECIFIED: ICD-10-CM

## 2025-08-07 DIAGNOSIS — I50.9 HEART FAILURE, UNSPECIFIED (H): ICD-10-CM

## 2025-08-07 RX ORDER — POTASSIUM CHLORIDE 1500 MG/1
20 TABLET, EXTENDED RELEASE ORAL PRN
Qty: 2 TABLET | Refills: 0 | Status: SHIPPED | OUTPATIENT
Start: 2025-08-07

## 2025-08-07 RX ORDER — METOLAZONE 5 MG/1
5 TABLET ORAL PRN
Qty: 2 TABLET | Refills: 0 | Status: SHIPPED | OUTPATIENT
Start: 2025-08-07

## 2025-08-12 LAB
ANION GAP SERPL CALCULATED.3IONS-SCNC: 13 MMOL/L (ref 7–15)
BUN SERPL-MCNC: 39.1 MG/DL (ref 8–23)
CALCIUM SERPL-MCNC: 9 MG/DL (ref 8.8–10.4)
CHLORIDE SERPL-SCNC: 79 MMOL/L (ref 98–107)
CHOLEST SERPL-MCNC: 145 MG/DL
CREAT SERPL-MCNC: 1.91 MG/DL (ref 0.67–1.17)
EGFRCR SERPLBLD CKD-EPI 2021: 34 ML/MIN/1.73M2
FASTING STATUS PATIENT QL REPORTED: NO
GLUCOSE SERPL-MCNC: 98 MG/DL (ref 70–99)
HCO3 SERPL-SCNC: 29 MMOL/L (ref 22–29)
HDLC SERPL-MCNC: 78 MG/DL
LDLC SERPL CALC-MCNC: 54 MG/DL
NONHDLC SERPL-MCNC: 67 MG/DL
NT-PROBNP SERPL-MCNC: 3913 PG/ML (ref 0–852)
POTASSIUM SERPL-SCNC: 3.3 MMOL/L (ref 3.4–5.3)
SODIUM SERPL-SCNC: 121 MMOL/L (ref 135–145)
TRIGL SERPL-MCNC: 63 MG/DL

## 2025-08-13 ENCOUNTER — ASSISTED LIVING VISIT (OUTPATIENT)
Dept: GERIATRICS | Facility: CLINIC | Age: 85
End: 2025-08-13
Payer: MEDICARE

## 2025-08-13 VITALS
HEART RATE: 60 BPM | BODY MASS INDEX: 35.15 KG/M2 | RESPIRATION RATE: 17 BRPM | SYSTOLIC BLOOD PRESSURE: 164 MMHG | OXYGEN SATURATION: 88 % | DIASTOLIC BLOOD PRESSURE: 149 MMHG | TEMPERATURE: 97 F | WEIGHT: 252 LBS

## 2025-08-13 DIAGNOSIS — E87.1 HYPONATREMIA: ICD-10-CM

## 2025-08-13 DIAGNOSIS — E87.6 HYPOKALEMIA: ICD-10-CM

## 2025-08-13 DIAGNOSIS — I50.9 CHRONIC CONGESTIVE HEART FAILURE, UNSPECIFIED HEART FAILURE TYPE (H): Primary | ICD-10-CM

## 2025-08-13 PROCEDURE — 99349 HOME/RES VST EST MOD MDM 40: CPT | Performed by: NURSE PRACTITIONER

## 2025-08-13 RX ORDER — SODIUM CHLORIDE 1 G/1
1 TABLET ORAL DAILY
Status: SHIPPED
Start: 2025-08-13 | End: 2025-08-20

## 2025-08-20 ENCOUNTER — ASSISTED LIVING VISIT (OUTPATIENT)
Dept: GERIATRICS | Facility: CLINIC | Age: 85
End: 2025-08-20
Payer: MEDICARE

## 2025-08-20 VITALS
DIASTOLIC BLOOD PRESSURE: 149 MMHG | HEART RATE: 60 BPM | BODY MASS INDEX: 35.15 KG/M2 | OXYGEN SATURATION: 88 % | SYSTOLIC BLOOD PRESSURE: 164 MMHG | WEIGHT: 252 LBS | TEMPERATURE: 97 F | RESPIRATION RATE: 17 BRPM

## 2025-08-20 DIAGNOSIS — E87.1 HYPONATREMIA: ICD-10-CM

## 2025-08-20 DIAGNOSIS — N18.32 CHRONIC KIDNEY DISEASE, STAGE 3B (H): ICD-10-CM

## 2025-08-20 DIAGNOSIS — E87.6 HYPOKALEMIA: ICD-10-CM

## 2025-08-20 DIAGNOSIS — I50.9 CHRONIC CONGESTIVE HEART FAILURE, UNSPECIFIED HEART FAILURE TYPE (H): Primary | ICD-10-CM

## 2025-08-20 PROCEDURE — 99349 HOME/RES VST EST MOD MDM 40: CPT | Performed by: NURSE PRACTITIONER

## 2025-08-22 ENCOUNTER — LAB REQUISITION (OUTPATIENT)
Dept: LAB | Facility: CLINIC | Age: 85
End: 2025-08-22
Payer: MEDICARE

## 2025-08-22 DIAGNOSIS — N18.9 CHRONIC KIDNEY DISEASE, UNSPECIFIED: ICD-10-CM

## 2025-08-22 DIAGNOSIS — I50.9 HEART FAILURE, UNSPECIFIED (H): ICD-10-CM

## 2025-08-26 DIAGNOSIS — I50.30 HEART FAILURE WITH PRESERVED EJECTION FRACTION, UNSPECIFIED HF CHRONICITY (H): Primary | ICD-10-CM

## 2025-08-26 LAB
ANION GAP SERPL CALCULATED.3IONS-SCNC: 14 MMOL/L (ref 7–15)
BUN SERPL-MCNC: 32.2 MG/DL (ref 8–23)
CALCIUM SERPL-MCNC: 9.2 MG/DL (ref 8.8–10.4)
CHLORIDE SERPL-SCNC: 88 MMOL/L (ref 98–107)
CREAT SERPL-MCNC: 1.72 MG/DL (ref 0.67–1.17)
EGFRCR SERPLBLD CKD-EPI 2021: 39 ML/MIN/1.73M2
GLUCOSE SERPL-MCNC: 91 MG/DL (ref 70–99)
HCO3 SERPL-SCNC: 26 MMOL/L (ref 22–29)
NT-PROBNP SERPL-MCNC: 4835 PG/ML (ref 0–852)
POTASSIUM SERPL-SCNC: 4.1 MMOL/L (ref 3.4–5.3)
SODIUM SERPL-SCNC: 128 MMOL/L (ref 135–145)

## 2025-08-27 ENCOUNTER — ASSISTED LIVING VISIT (OUTPATIENT)
Dept: GERIATRICS | Facility: CLINIC | Age: 85
End: 2025-08-27
Payer: MEDICARE

## 2025-08-27 VITALS
BODY MASS INDEX: 35.15 KG/M2 | WEIGHT: 252 LBS | SYSTOLIC BLOOD PRESSURE: 164 MMHG | OXYGEN SATURATION: 88 % | HEART RATE: 60 BPM | RESPIRATION RATE: 17 BRPM | DIASTOLIC BLOOD PRESSURE: 149 MMHG | TEMPERATURE: 97 F

## 2025-08-27 DIAGNOSIS — I50.9 CHRONIC CONGESTIVE HEART FAILURE, UNSPECIFIED HEART FAILURE TYPE (H): Primary | ICD-10-CM

## 2025-08-27 DIAGNOSIS — E87.1 HYPONATREMIA: ICD-10-CM

## 2025-08-27 DIAGNOSIS — N18.32 CHRONIC KIDNEY DISEASE, STAGE 3B (H): ICD-10-CM

## 2025-08-27 PROCEDURE — 99349 HOME/RES VST EST MOD MDM 40: CPT | Performed by: NURSE PRACTITIONER

## 2025-08-29 ENCOUNTER — LAB REQUISITION (OUTPATIENT)
Dept: LAB | Facility: CLINIC | Age: 85
End: 2025-08-29
Payer: MEDICARE

## 2025-08-29 DIAGNOSIS — I50.9 HEART FAILURE, UNSPECIFIED (H): ICD-10-CM

## 2025-09-02 LAB
ANION GAP SERPL CALCULATED.3IONS-SCNC: 13 MMOL/L (ref 7–15)
BUN SERPL-MCNC: 29.5 MG/DL (ref 8–23)
CALCIUM SERPL-MCNC: 9.2 MG/DL (ref 8.8–10.4)
CHLORIDE SERPL-SCNC: 94 MMOL/L (ref 98–107)
CREAT SERPL-MCNC: 1.74 MG/DL (ref 0.67–1.17)
EGFRCR SERPLBLD CKD-EPI 2021: 38 ML/MIN/1.73M2
GLUCOSE SERPL-MCNC: 117 MG/DL (ref 70–99)
HCO3 SERPL-SCNC: 23 MMOL/L (ref 22–29)
POTASSIUM SERPL-SCNC: 4.4 MMOL/L (ref 3.4–5.3)
SODIUM SERPL-SCNC: 130 MMOL/L (ref 135–145)

## 2025-09-04 ENCOUNTER — OFFICE VISIT (OUTPATIENT)
Dept: CARDIOLOGY | Facility: CLINIC | Age: 85
End: 2025-09-04
Payer: MEDICARE

## 2025-09-04 ENCOUNTER — TELEPHONE (OUTPATIENT)
Dept: CARDIOLOGY | Facility: CLINIC | Age: 85
End: 2025-09-04

## 2025-09-04 VITALS
OXYGEN SATURATION: 94 % | DIASTOLIC BLOOD PRESSURE: 73 MMHG | WEIGHT: 258 LBS | BODY MASS INDEX: 35.98 KG/M2 | SYSTOLIC BLOOD PRESSURE: 115 MMHG | HEART RATE: 110 BPM

## 2025-09-04 DIAGNOSIS — I50.30 HEART FAILURE WITH PRESERVED EJECTION FRACTION, UNSPECIFIED HF CHRONICITY (H): Primary | ICD-10-CM

## 2025-09-04 DIAGNOSIS — I50.9 CHRONIC CONGESTIVE HEART FAILURE, UNSPECIFIED HEART FAILURE TYPE (H): ICD-10-CM

## 2025-09-04 RX ORDER — BUMETANIDE 2 MG/1
4 TABLET ORAL DAILY
Qty: 180 TABLET | Refills: 3 | Status: SHIPPED | OUTPATIENT
Start: 2025-09-04